# Patient Record
Sex: FEMALE | Race: WHITE | Employment: OTHER | URBAN - METROPOLITAN AREA
[De-identification: names, ages, dates, MRNs, and addresses within clinical notes are randomized per-mention and may not be internally consistent; named-entity substitution may affect disease eponyms.]

---

## 2017-01-03 ENCOUNTER — TELEPHONE (OUTPATIENT)
Dept: INTERNAL MEDICINE CLINIC | Age: 77
End: 2017-01-03

## 2017-02-03 DIAGNOSIS — L50.9 URTICARIA: Primary | ICD-10-CM

## 2017-02-03 RX ORDER — HYDROXYZINE 25 MG/1
25 TABLET, FILM COATED ORAL
Qty: 30 TAB | Refills: 2 | Status: SHIPPED | OUTPATIENT
Start: 2017-02-03 | End: 2017-02-10

## 2017-02-06 ENCOUNTER — OFFICE VISIT (OUTPATIENT)
Dept: INTERNAL MEDICINE CLINIC | Age: 77
End: 2017-02-06

## 2017-02-06 VITALS
SYSTOLIC BLOOD PRESSURE: 122 MMHG | TEMPERATURE: 96.2 F | RESPIRATION RATE: 16 BRPM | HEIGHT: 61 IN | WEIGHT: 115.2 LBS | HEART RATE: 81 BPM | BODY MASS INDEX: 21.75 KG/M2 | OXYGEN SATURATION: 98 % | DIASTOLIC BLOOD PRESSURE: 70 MMHG

## 2017-02-06 DIAGNOSIS — Z00.00 MEDICARE ANNUAL WELLNESS VISIT, SUBSEQUENT: Primary | ICD-10-CM

## 2017-02-06 RX ORDER — HYDROXYZINE HYDROCHLORIDE 10 MG/1
TABLET, FILM COATED ORAL
Refills: 2 | COMMUNITY
Start: 2016-11-13 | End: 2017-02-10

## 2017-02-06 NOTE — MR AVS SNAPSHOT
Visit Information Date & Time Provider Department Dept. Phone Encounter #  
 2/6/2017 11:45 AM [facility] [phone] Upcoming Health Maintenance Date Due  
 GLAUCOMA SCREENING Q2Y 10/1/2015 INFLUENZA AGE 9 TO ADULT 8/1/2016 MEDICARE YEARLY EXAM 2/7/2018 DTaP/Tdap/Td series (2 - Td) 4/19/2026 COLONOSCOPY 5/23/2026 Allergies as of 2/6/2017  Review Complete On: 2/6/2017 By: Marquita Jeans, LPN Severity Noted Reaction Type Reactions Other Food  05/20/2016    Nausea and Vomiting Artificial sweeteners allergy. Does not use corn syrup in diet. 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 Lightheadedness and dizziness, vertigo. Heart \"beating out of my chest\". \"Inhaled a breath but could not exhale\". Was on a fluoride treatment with dentist. [place] to ER. \"[place] like I was jello\". Nitrous Oxide  12/08/2014   Side Effect Other (comments) Blood pressure bottoms out Other Medication  05/20/2016    Other (comments) Avelox per pt is about the only antibiotic she can take. Not sure of the names of antibiotics she is allergic to. Caused intestinal obstruction. Other Plant, Animal, Environmental  05/20/2016    Other (comments) Mold spores, airborne fungal spores, trees, plants causes sinus infections and pneumonia. Pcn [Penicillins]  05/20/2016    Other (comments) State PCN does not work. Wheat  05/20/2016    Other (comments) Cannot digest wheat per pt. Current Immunizations  Reviewed on 4/25/2016 Name Date Pneumococcal Conjugate (PCV-13) 4/19/2016 Pneumococcal Polysaccharide (PPSV-23) 8/1/2012 Tdap 4/19/2016 Zoster Vaccine, Live 9/1/2015 Not reviewed this visit You Were Diagnosed With   
  
 Codes Comments  Medicare annual wellness visit, subsequent    -  Primary ICD-10-CM: Z00.00 ICD-9-CM: V70.0 Vitals BP Pulse Temp Resp Height(growth percentile) Weight(growth percentile) 122/70 (BP 1 Location: Left arm, BP Patient Position: Sitting) 81 96.2 °F (35.7 °C) (Oral) 16 5' 1\" (1.549 m) 115 lb 3.2 oz (52.3 kg) SpO2 BMI OB Status Smoking Status 98% 21.77 kg/m2 Postmenopausal Current Every Day Smoker Vitals History BMI and BSA Data Body Mass Index Body Surface Area 21.77 kg/m 2 1.5 m 2 Preferred Pharmacy Pharmacy Name Phone [facility], [address] [place] [address] [phone] Your Updated Medication List  
  
   
This list is accurate as of: 2/6/17 12:21 PM.  Always use your most recent med list. ADVIL 200 mg tablet Generic drug:  ibuprofen Take 200 mg by mouth every eight (8) hours as needed for Pain. ALPRAZolam 0.25 mg tablet Commonly known as:  XANAX  
TAKE ONE TABLET BY MOUTH TWICE DAILY AS NEEDED FOR ANXIETY  
  
 calcitonin (salmon) nasal  
Commonly known as:  MIACALCIN  
INHALE 1 SPRAY ONCE A DAY AS DIRECTED  
  
 CALCIUM + D PO Take 630 mg by mouth daily. cromolyn 5.2 mg/spray (4 %) nasal spray Commonly known as:  NASALCROM  
1 [place] by Both Nostrils route as needed for Allergies. fexofenadine 180 mg tablet Commonly known as:  Christina Chock Take  by mouth. * hydrOXYzine HCl 10 mg tablet Commonly known as:  ATARAX TAKE 1 TAB BY MOUTH THREE (3) TIMES DAILY AS NEEDED (FOR VERTIGO OR HIVES). * hydrOXYzine HCl 25 mg tablet Commonly known as:  ATARAX Take 1 Tab by mouth three (3) times daily as needed for Itching for up to 10 days. Indications: URTICARIA  
  
 hyoscyamine 0.125 mg tablet Commonly known as:  ANASPAZ, LEVSIN  
TAKE 1 TABLET BY MOUTH BEFORE MEALS 3 TIMES A DAY & AT BEDTIME  
  
 levothyroxine 50 mcg tablet Commonly known as:  SYNTHROID Take 1 Tab by mouth Daily (before breakfast). LYRICA 75 mg capsule Generic drug:  pregabalin TAKE ONE CAPSULE BY MOUTH THREE TIMES DAILY  
  
 OTHER  
GAS AID as needed. SPIRULINA Take 6 Tabs by mouth daily. Each tab is 550mg. * Notice: This list has 2 medication(s) that are the same as other medications prescribed for you. Read the directions carefully, and ask your doctor or other care provider to review them with you. Patient Instructions Schedule of Personalized Health Plan (Provide Copy to Patient) The best way to stay healthy is to live a healthy lifestyle. A healthy lifestyle includes regular exercise, eating a well-balanced diet, keeping a healthy weight and not smoking. Regular physical exams and screening tests are another important way to take care of yourself. Preventive exams provided by health care providers can find health problems early when treatment works best and can keep you from getting certain diseases or illnesses. Preventive services include exams, lab tests, screenings, shots, monitoring and information to help you take care of your own health. All people over 65 should have a pneumonia shot. Pneumonia shots are usually only needed once in a lifetime unless your doctor decides differently. All people over 65 should have a yearly flu shot.-patient declined People over 65 are at medium to high risk for Hepatitis B. Three shots are needed for complete protection. In addition to your physical exam, some screening tests are recommended: 
 
Bone mass measurement (dexa scan) is recommended every two years Diabetes Mellitus screening is recommended every year. Glaucoma is an eye disease caused by high pressure in the eye. An eye exam is recommended every year. Cardiovascular screening tests that check your cholesterol and other blood fat (lipid) levels are recommended every five years.   
 
Colorectal Cancer screening tests help to find pre-cancerous polyps (growths in the colon) so they can be removed before they turn into cancer. Tests ordered for screening depend on your personal and family history risk factors. Screening for Breast Cancer is recommended yearly with a mammogram. 
 
Screening for Cervical Cancer is recommended every two years (annually for certain risk factors, such as previous history of STD or abnormal PAP in past 7 years), with a Pelvic Exam with PAP Here is a list of your current Health Maintenance items with a due date: 
Health Maintenance Topic Date Due  GLAUCOMA SCREENING Q2Y  10/01/2015  INFLUENZA AGE 9 TO ADULT  08/01/2016  MEDICARE YEARLY EXAM  02/07/2018  DTaP/Tdap/Td series (2 - Td) 04/19/2026  COLONOSCOPY  05/23/2026  
 OSTEOPOROSIS SCREENING (DEXA)  Completed  ZOSTER VACCINE AGE 60>  Completed  Pneumococcal 65+ Low/Medium Risk  Completed [facility] SERVICES! Shyla Genao introduces WikiYou patient portal. Now you can access parts of your medical record, email your doctor's office, and request medication refills online. 1. In your internet browser, go to [email or web address] 2. Click on the First Time User? Click Here link in the Sign In box. You will see the New Member Sign Up page. 3. Enter your WikiYou Access Code exactly as it appears below. You will not need to use this code after youve completed the sign-up process. If you do not sign up before the expiration date, you must request a new code. · WikiYou Access Code: [email or web address] Expires: 3/21/2017  4:17 PM 
 
4. Enter the last four digits of your Social Security Number (xxxx) and Date of Birth (mm/dd/yyyy) as indicated and click Submit. You will be taken to the next sign-up page. 5. Create a WikiYou ID. This will be your WikiYou login ID and cannot be changed, so think of one that is secure and easy to remember. 6. Create a NightOwlt password. You can change your password at any time. 7. Enter your Password Reset Question and Answer. This can be used at a later time if you forget your password. 8. Enter your e-mail address. You will receive e-mail notification when new information is available in [address] 9. Click Sign Up. You can now view and download portions of your medical record. 10. Click the Download Summary menu link to download a portable copy of your medical information. If you have questions, please visit the Frequently Asked Questions section of the [facility] website. Remember, [facility] is NOT to be used for urgent needs. For medical emergencies, dial 911. Now available from your iPhone and Android! Please provide this summary of care documentation to your next provider. Your primary care clinician is listed as Ferrell Soulier. If you have any questions after today's visit, please call [phone].

## 2017-02-06 NOTE — PATIENT INSTRUCTIONS
Schedule of Personalized Health Plan  (Provide Copy to Patient)  The best way to stay healthy is to live a healthy lifestyle. A healthy lifestyle includes regular exercise, eating a well-balanced diet, keeping a healthy weight and not smoking. Regular physical exams and screening tests are another important way to take care of yourself. Preventive exams provided by health care providers can find health problems early when treatment works best and can keep you from getting certain diseases or illnesses. Preventive services include exams, lab tests, screenings, shots, monitoring and information to help you take care of your own health. All people over 65 should have a pneumonia shot. Pneumonia shots are usually only needed once in a lifetime unless your doctor decides differently. All people over 65 should have a yearly flu shot.-patient declined    People over 65 are at medium to high risk for Hepatitis B. Three shots are needed for complete protection. In addition to your physical exam, some screening tests are recommended:    Bone mass measurement (dexa scan) is recommended every two years  Diabetes Mellitus screening is recommended every year. Glaucoma is an eye disease caused by high pressure in the eye. An eye exam is recommended every year. Cardiovascular screening tests that check your cholesterol and other blood fat (lipid) levels are recommended every five years. Colorectal Cancer screening tests help to find pre-cancerous polyps (growths in the colon) so they can be removed before they turn into cancer. Tests ordered for screening depend on your personal and family history risk factors.     Screening for Breast Cancer is recommended yearly with a mammogram.    Screening for Cervical Cancer is recommended every two years (annually for certain risk factors, such as previous history of STD or abnormal PAP in past 7 years), with a Pelvic Exam with PAP    Here is a list of your current Health Maintenance items with a due date:  Health Maintenance   Topic Date Due    GLAUCOMA SCREENING Q2Y  10/01/2015    INFLUENZA AGE 9 TO ADULT  08/01/2016    MEDICARE YEARLY EXAM  02/07/2018    DTaP/Tdap/Td series (2 - Td) 04/19/2026    COLONOSCOPY  05/23/2026    OSTEOPOROSIS SCREENING (DEXA)  Completed    ZOSTER VACCINE AGE 60>  Completed    Pneumococcal 65+ Low/Medium Risk  Completed

## 2017-02-06 NOTE — PROGRESS NOTES
Arron Rowley is a 78 y.o. female and presents for annual Medicare Wellness Visit. Problem List: Reviewed with patient and discussed risk factors. Patient Active Problem List   Diagnosis Code    Hypothyroidism E03.9    Hyperlipidemia E78.5    Spinal stenosis, lumbar M48.06    Degenerative disc disease, lumbar M51.36    Irritable bowel syndrome without diarrhea K58.9    Personal history of DVT (deep vein thrombosis) Z86.718    History of shingles Z86.19       Current medical providers:  Patient Care Team:  Eric Valentine MD as PCP - General (Internal Medicine)    PSH: Reviewed with patient  Past Surgical History   Procedure Laterality Date    Pr appendectomy      Pr breast surgery procedure unlisted       stereotactic bx bilateral - bening    Hx other surgical       pilonidal cyst removed    Hx gyn  1980     laparotomy - ovarian cysts    Hx dilation and curettage       x2    Hx tubal ligation       went in to do tubal ligation and found that tubes were \"wiped out from appendicitis\".    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 spine    Hx cataract removal  2013     bilateral    Hx tonsillectomy      Hx heent       dental surgeries    Colonoscopy N/A 5/23/2016     COLONOSCOPY performed by Kings Telles MD at [address] [place] [address] ENDOSCOPY        SH: Reviewed with patient  Social History   Substance Use Topics    Smoking status: Current Every Day Smoker     Types: Cigarettes    Smokeless tobacco: Never Used      Comment: 4-5 cigs per day    Alcohol use No       FH: Reviewed with patient  Family History   Problem Relation Age of Onset   Aundria Dadds Migraines Mother     Cancer Mother      breast - mets to spinal cord and brain    Breast Cancer Mother      42's    Stroke Father      mini    Dementia Father     Other Father      prostate surgery/hallucination with pain meds (too much per pt)/tremors    Other Brother      brain aneurysm/blood clot in leg/polio    Cancer Brother      lung    Headache Maternal Grandmother     Kidney Disease Maternal Grandmother     Heart Disease Maternal Grandfather     Heart Disease Paternal Grandmother     Other Paternal Grandmother      overweight    Heart Attack Paternal Grandfather        Medications/Allergies: Reviewed with patient  Current Outpatient Prescriptions on File Prior to Visit   Medication Sig Dispense Refill    hydrOXYzine HCl (ATARAX) 25 mg tablet Take 1 Tab by mouth three (3) times daily as needed for Itching for up to 10 days. Indications: URTICARIA 30 Tab 2    ALPRAZolam (XANAX) 0.25 mg tablet TAKE ONE TABLET BY MOUTH TWICE DAILY AS NEEDED FOR ANXIETY 60 Tab 0    LYRICA 75 mg capsule TAKE ONE CAPSULE BY MOUTH THREE TIMES DAILY 90 Cap 3    calcitonin, salmon, (MIACALCIN) nasal INHALE 1 SPRAY ONCE A DAY AS DIRECTED 2 Bottle 2    levothyroxine (SYNTHROID) 50 mcg tablet Take 1 Tab by mouth Daily (before breakfast). 30 Tab 4    fexofenadine (ALLEGRA) 180 mg tablet Take  by mouth.  CALCIUM CARBONATE/VITAMIN D3 (CALCIUM + D PO) Take 630 mg by mouth daily.  BLUE-GREEN ALGAE (SPIRULINA) Take 6 Tabs by mouth daily. Each tab is 550mg.  OTHER GAS AID as needed.  cromolyn (NASALCROM) 5.2 mg/spray (4 %) nasal spray 1 [place] by Both Nostrils route as needed for Allergies.  hyoscyamine (ANASPAZ, LEVSIN) 0.125 mg tablet TAKE 1 TABLET BY MOUTH BEFORE MEALS 3 TIMES A DAY & AT BEDTIME 60 Tab 6    ibuprofen (ADVIL) 200 mg tablet Take 200 mg by mouth every eight (8) hours as needed for Pain. No current facility-administered medications on file prior to visit. Allergies   Allergen Reactions    Other Food Nausea and Vomiting     Artificial sweeteners allergy. Does not use corn syrup in diet. 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 Lightheadedness and dizziness, vertigo. Heart \"beating out of my chest\". \"Inhaled a breath but could not exhale\".  Was on a fluoride treatment with dentist. [place] to ER. \"[place] like I was jello\".  Nitrous Oxide Other (comments)     Blood pressure bottoms out    Other Medication Other (comments)     Avelox per pt is about the only antibiotic she can take. Not sure of the names of antibiotics she is allergic to. Caused intestinal obstruction.  Other Plant, Animal, Environmental Other (comments)     Mold spores, airborne fungal spores, trees, plants causes sinus infections and pneumonia.  Pcn [Penicillins] Other (comments)     State PCN does not work.  Wheat Other (comments)     Cannot digest wheat per pt. Objective:  Visit Vitals    /70 (BP 1 Location: Left arm, BP Patient Position: Sitting)    Pulse 81    Temp 96.2 °F (35.7 °C) (Oral)    Resp 16    Ht 5' 1\" (1.549 m)    Wt 115 lb 3.2 oz (52.3 kg)    SpO2 98%    BMI 21.77 kg/m2    Body mass index is 21.77 kg/(m^2). Assessment of cognitive impairment: Alert and oriented x 3    Depression Screen:   PHQ 2 / 9, over the last two weeks 2/6/2017   Little interest or pleasure in doing things Not at all   Feeling down, depressed or hopeless Not at all   Total Score PHQ 2 0       Fall Risk Assessment:    Fall Risk Assessment, last 12 mths 2/6/2017   Able to walk? Yes   Fall in past 12 months? Yes   Fall with injury? No   Number of falls in past 12 months 1   Fall Risk Score 1       Functional Ability:   Does the patient exhibit a steady gait? yes   How long did it take the patient to get up and walk from a sitting position? 4-5 seconds   Is the patient self reliant?  (ie can do own laundry, meals, household chores)  yes     Does the patient handle his/her own medications? yes     Does the patient handle his/her own money? yes     Is the patients home safe (ie good lighting, handrails on stairs and bath, etc.)? yes     Did you notice or did patient express any hearing difficulties? no     Did you notice or did patient express any vision difficulties?    no Were distance and reading eye charts used? no       Advance Care Planning:   Patient was offered the opportunity to discuss advance care planning:  yes     Does patient have an Advance Directive:  no   If no, did you provide information on Caring Connections?  no       Plan:      Orders Placed This Encounter    hydrOXYzine HCl (ATARAX) 10 mg tablet       Health Maintenance   Topic Date Due    GLAUCOMA SCREENING Q2Y  10/01/2015    INFLUENZA AGE 9 TO ADULT  08/01/2016    MEDICARE YEARLY EXAM  02/07/2018    DTaP/Tdap/Td series (2 - Td) 04/19/2026    COLONOSCOPY  05/23/2026    OSTEOPOROSIS SCREENING (DEXA)  Completed    ZOSTER VACCINE AGE 60>  Completed    Pneumococcal 65+ Low/Medium Risk  Completed       *Patient verbalized understanding and agreement with the plan. A copy of the After Visit Summary with personalized health plan was given to the patient today. Patient up to date on all immunizations, except flu which she denies. Patient is due for eye exam and will schedule with Dr. Andrew Manning. Last DXA scan and mammogram done 10/2016. Last colonoscopy 5/2016 with Dr. Bal Tarango. Patient reports she tries to follow FODMAP diet. She does gardening for exercise. She has grab bars in her bathroom and avoids throw rugs. Discussed advanced medical directive info with patient.

## 2017-02-10 DIAGNOSIS — F41.9 ANXIETY: ICD-10-CM

## 2017-02-10 RX ORDER — ALPRAZOLAM 0.25 MG/1
TABLET ORAL
Qty: 60 TAB | Refills: 0 | Status: SHIPPED | OUTPATIENT
Start: 2017-02-10 | End: 2017-03-10 | Stop reason: SDUPTHER

## 2017-03-10 DIAGNOSIS — F41.9 ANXIETY: ICD-10-CM

## 2017-03-10 DIAGNOSIS — E03.8 OTHER SPECIFIED HYPOTHYROIDISM: Primary | ICD-10-CM

## 2017-03-10 RX ORDER — LEVOTHYROXINE SODIUM 50 UG/1
50 TABLET ORAL
Qty: 30 TAB | Refills: 4 | Status: SHIPPED | OUTPATIENT
Start: 2017-03-10 | End: 2017-04-20 | Stop reason: SDUPTHER

## 2017-03-13 RX ORDER — ALPRAZOLAM 0.25 MG/1
TABLET ORAL
Qty: 60 TAB | Refills: 0 | Status: SHIPPED | OUTPATIENT
Start: 2017-03-13 | End: 2017-04-11 | Stop reason: SDUPTHER

## 2017-03-22 RX ORDER — HYOSCYAMINE SULFATE 0.125 MG
TABLET ORAL
Qty: 150 TAB | Refills: 0 | Status: SHIPPED | OUTPATIENT
Start: 2017-03-22 | End: 2017-06-14

## 2017-03-29 RX ORDER — PREGABALIN 75 MG/1
CAPSULE ORAL
Qty: 90 CAP | Refills: 3 | Status: SHIPPED | OUTPATIENT
Start: 2017-03-29 | End: 2017-07-24 | Stop reason: SDUPTHER

## 2017-04-11 DIAGNOSIS — F41.9 ANXIETY: ICD-10-CM

## 2017-04-12 RX ORDER — ALPRAZOLAM 0.25 MG/1
TABLET ORAL
Qty: 60 TAB | Refills: 0 | Status: SHIPPED | OUTPATIENT
Start: 2017-04-12 | End: 2017-06-14

## 2017-05-02 DIAGNOSIS — L50.9 URTICARIA: ICD-10-CM

## 2017-05-03 RX ORDER — HYDROXYZINE 25 MG/1
TABLET, FILM COATED ORAL
Qty: 30 TAB | Refills: 0 | Status: SHIPPED | OUTPATIENT
Start: 2017-05-03 | End: 2017-06-01 | Stop reason: SDUPTHER

## 2017-05-23 RX ORDER — CALCITONIN SALMON 200 [IU]/.09ML
SPRAY, METERED NASAL
Qty: 2 BOTTLE | Refills: 2 | Status: SHIPPED | OUTPATIENT
Start: 2017-05-23 | End: 2018-02-07 | Stop reason: SDUPTHER

## 2017-06-01 DIAGNOSIS — L50.9 URTICARIA: ICD-10-CM

## 2017-06-01 RX ORDER — HYDROXYZINE 25 MG/1
TABLET, FILM COATED ORAL
Qty: 30 TAB | Refills: 0 | Status: SHIPPED | OUTPATIENT
Start: 2017-06-01 | End: 2017-07-06 | Stop reason: SDUPTHER

## 2017-06-06 DIAGNOSIS — F41.9 ANXIETY: ICD-10-CM

## 2017-06-07 RX ORDER — ALPRAZOLAM 0.25 MG/1
TABLET ORAL
Qty: 60 TAB | Refills: 0 | OUTPATIENT
Start: 2017-06-07

## 2017-06-08 NOTE — TELEPHONE ENCOUNTER
Contacted patient to schedule appointment.  She is having issues with her eyes and will contact our office once she has gotten everything with her ophthalmologist  out the way

## 2017-06-14 ENCOUNTER — OFFICE VISIT (OUTPATIENT)
Dept: INTERNAL MEDICINE CLINIC | Age: 77
End: 2017-06-14

## 2017-06-14 VITALS
RESPIRATION RATE: 18 BRPM | SYSTOLIC BLOOD PRESSURE: 160 MMHG | BODY MASS INDEX: 21.69 KG/M2 | DIASTOLIC BLOOD PRESSURE: 60 MMHG | HEART RATE: 76 BPM | HEIGHT: 61 IN | OXYGEN SATURATION: 98 % | WEIGHT: 114.9 LBS | TEMPERATURE: 97 F

## 2017-06-14 DIAGNOSIS — F41.1 ANXIETY, GENERALIZED: Primary | ICD-10-CM

## 2017-06-14 RX ORDER — ESCITALOPRAM OXALATE 10 MG/1
10 TABLET ORAL DAILY
Qty: 30 TAB | Refills: 3 | Status: SHIPPED | OUTPATIENT
Start: 2017-06-14 | End: 2017-10-04 | Stop reason: SDUPTHER

## 2017-06-14 NOTE — PROGRESS NOTES
Subjective:     Chief Complaint   Patient presents with    Medication Evaluation     She  is a 78y.o. year old female who presents for evaluation. Everything is going pretty well for here. Here to discuss medication. Was able to taper off of Xanax and has been off of it for one month. Has to get during the night to urinate. Has had spasm in hand and hit left eye with hand.       Historical Data    Past Medical History:   Diagnosis Date    Adverse effect of anesthesia     decreased BP with nitrous oxide    Chronic kidney disease     kidney stones    Chronic pain     neuropathy - burning feeling    Degenerative disc disease     Hypothyroidism     Ill-defined condition     genital warts    Ill-defined condition     neuropathy in torso    Ill-defined condition     osteopenia    Ill-defined condition     stigmatism right eye    Ill-defined condition     IBS    Ill-defined condition     phlebitis    Ill-defined condition     hx chickenpox/shingles    Ill-defined condition     hx double pneumonia    Ill-defined condition     \"stomach moved over after fall\" per pt per MD    Ill-defined condition     as child chronic tonsillitis/strep/gets infections easily per pt    Lumbar stenosis     Rheumatic fever     Thromboembolus (HCC)     R leg    Tremor     hand/fingers        Past Surgical History:   Procedure Laterality Date    APPENDECTOMY      BREAST SURGERY PROCEDURE UNLISTED      stereotactic bx bilateral - bening    COLONOSCOPY N/A 5/23/2016    COLONOSCOPY performed by Klaudia Cardoso MD at [facility] ENDOSCOPY    HX CATARACT REMOVAL  2013    bilateral    HX DILATION AND CURETTAGE      x2    HX GYN  1980    laparotomy - ovarian cysts    HX HEENT      dental surgeries    HX ORTHOPAEDIC      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 Outpatient Encounter Prescriptions as of 6/14/2017   Medication Sig Dispense Refill    hydrOXYzine HCl (ATARAX) 25 mg tablet TAKE ONE TABLET BY MOUTH THREE TIMES DAILY AS NEEDED FOR ITCHING FOR UP TO 10 DAYS 30 Tab 0    calcitonin, salmon, (MIACALCIN) nasal INHALE 1 SPRAY ONCE A DAY AS DIRECTED 2 Bottle 2    levothyroxine (SYNTHROID) 50 mcg tablet Take 1 Tab by mouth Daily (before breakfast). 90 Tab 2    ALPRAZolam (XANAX) 0.25 mg tablet TAKE ONE TABLET BY MOUTH TWICE DAILY AS NEEDED FOR ANXIETY 60 Tab 0    LYRICA 75 mg capsule TAKE ONE CAPSULE BY MOUTH THREE TIMES DAILY 90 Cap 3    hyoscyamine (ANASPAZ, LEVSIN) 0.125 mg tablet TAKE ONE TABLET BY MOUTH THREE TIMES A DAY BEFORE MEALS AND AT BEDTIME 150 Tab 0    fexofenadine (ALLEGRA) 180 mg tablet Take  by mouth.  CALCIUM CARBONATE/VITAMIN D3 (CALCIUM + D PO) Take 630 mg by mouth daily.  BLUE-GREEN ALGAE (SPIRULINA) Take 6 Tabs by mouth daily. Each tab is 550mg.  OTHER GAS AID as needed.  cromolyn (NASALCROM) 5.2 mg/spray (4 %) nasal spray 1 [place] by Both Nostrils route as needed for Allergies.  ibuprofen (ADVIL) 200 mg tablet Take 200 mg by mouth every eight (8) hours as needed for Pain. No facility-administered encounter medications on file as of 6/14/2017. Allergies   Allergen Reactions    Other Food Nausea and Vomiting     Artificial sweeteners allergy. Does not use corn syrup in diet. 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 Lightheadedness and dizziness, vertigo. Heart \"beating out of my chest\". \"Inhaled a breath but could not exhale\". Was on a fluoride treatment with dentist. Lurdes Mendieta to ER. \"[place] like I was jello\".  Nitrous Oxide Other (comments)     Blood pressure bottoms out    Other Medication Other (comments)     Avelox per pt is about the only antibiotic she can take.  Not sure of the names of antibiotics she is allergic to. Caused intestinal obstruction.  Other Plant, Animal, Environmental Other (comments)     Mold spores, airborne fungal spores, trees, plants causes sinus infections and pneumonia.  Pcn [Penicillins] Other (comments)     State PCN does not work.  Wheat Other (comments)     Cannot digest wheat per pt. Social History     Social History    Marital status:      Spouse name: N/A    Number of children: N/A    Years of education: N/A     Occupational History    Not on file. Social History Main Topics    Smoking status: Current Every Day Smoker     Types: Cigarettes    Smokeless tobacco: Never Used      Comment: 4-5 cigs per day    Alcohol use No    Drug use: No    Sexual activity: No     Other Topics Concern    Not on file     Social History Narrative        Review of Systems  Pertinent items are noted in HPI. Objective:     Vitals:    06/14/17 1123   BP: 160/60   Pulse: 76   Resp: 18   Temp: 97 °F (36.1 °C)   TempSrc: Oral   SpO2: 98%   Weight: 114 lb 14.4 oz (52.1 kg)   Height: 5' 1\" (1.549 m)     Pleasant WF in no acute distress. Eyes: O.S. Is without injury. Neck: Supple. Cardiac: RRR without murmurs, gallops or rubs. Lungs: Clear to auscultation. Neuro: Somewhat tremulous. ASSESSMENT / PLAN:   1. Anxiety, generalized  · Successfully off of benzodiazepine. · Start SSRI instead. · Anticipatory guidance. - escitalopram oxalate (LEXAPRO) 10 mg tablet; Take 1 Tab by mouth daily. Indications: ANXIETY WITH DEPRESSION, POST TRAUMATIC STRESS DISORDER  Dispense: 30 Tab; Refill: 3             Follow-up Disposition:  Return in about 4 months (around 10/14/2017) for F/U anxiety, etc.   Advised her to call back or return to office if symptoms worsen/change/persist.  Discussed expected course/resolution/complications of diagnosis in detail with patient. Medication risks/benefits/costs/interactions/alternatives discussed with patient.   She was given an after visit summary which includes diagnoses, current medications, & vitals. She expressed understanding with the diagnosis and plan.

## 2017-06-14 NOTE — PATIENT INSTRUCTIONS
Stay off of alprazolam.  Start Lexapro 10 mg once daily. Limit hydroxyzine. Continue your other medications.

## 2017-06-14 NOTE — MR AVS SNAPSHOT
Visit Information Date & Time Provider Department Dept. Phone Encounter #  
 6/14/2017 11:00 AM Kimberly Gonzalez MD [place] [id number] [facility] [phone] [id number] Follow-up Instructions Return in about 4 months (around 10/14/2017) for F/U anxiety, etc. Upcoming Health Maintenance Date Due  
 GLAUCOMA SCREENING Q2Y 10/1/2015 INFLUENZA AGE 9 TO ADULT 8/1/2017 MEDICARE YEARLY EXAM 2/7/2018 DTaP/Tdap/Td series (2 - Td) 4/19/2026 COLONOSCOPY 5/23/2026 Allergies as of 6/14/2017  Review Complete On: 6/14/2017 By: Kimberly Gonzalez MD  
  
 Severity Noted Reaction Type Reactions Other Food  05/20/2016    Nausea and Vomiting Artificial sweeteners allergy. Does not use corn syrup in diet. 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 Lightheadedness and dizziness, vertigo. Heart \"beating out of my chest\". \"Inhaled a breath but could not exhale\". Was on a fluoride treatment with dentist. Lurdes Mendieta to ER. \"[place] like I was jello\". Nitrous Oxide  12/08/2014   Side Effect Other (comments) Blood pressure bottoms out Other Medication  05/20/2016    Other (comments) Avelox per pt is about the only antibiotic she can take. Not sure of the names of antibiotics she is allergic to. Caused intestinal obstruction. Other Plant, Animal, Environmental  05/20/2016    Other (comments) Mold spores, airborne fungal spores, trees, plants causes sinus infections and pneumonia. Pcn [Penicillins]  05/20/2016    Other (comments) State PCN does not work. Wheat  05/20/2016    Other (comments) Cannot digest wheat per pt. Current Immunizations  Reviewed on 4/25/2016 Name Date Pneumococcal Conjugate (PCV-13) 4/19/2016 Pneumococcal Polysaccharide (PPSV-23) 8/1/2012 Tdap 4/19/2016 Zoster Vaccine, Live 9/1/2015 Not reviewed this visit You Were Diagnosed With   
  
 Codes Comments Anxiety, generalized    -  Primary ICD-10-CM: F41.1 ICD-9-CM: 300.02 Vitals BP Pulse Temp Resp Height(growth percentile) Weight(growth percentile) 160/60 (BP 1 Location: Right arm, BP Patient Position: Sitting) 76 97 °F (36.1 °C) (Oral) 18 5' 1\" (1.549 m) 114 lb 14.4 oz (52.1 kg) SpO2 BMI OB Status Smoking Status 98% 21.71 kg/m2 Postmenopausal Current Every Day Smoker BMI and BSA Data Body Mass Index Body Surface Area 21.71 kg/m 2 1.5 m 2 Preferred Pharmacy Pharmacy Name Phone [facility] [address] , [address] [place] [address] [phone] Your Updated Medication List  
  
   
This list is accurate as of: 6/14/17 11:48 AM.  Always use your most recent med list.  
  
  
  
  
 calcitonin (salmon) nasal  
Commonly known as:  MIACALCIN  
INHALE 1 SPRAY ONCE A DAY AS DIRECTED  
  
 escitalopram oxalate 10 mg tablet Commonly known as:  Mariza Gins Take 1 Tab by mouth daily. Indications: ANXIETY WITH DEPRESSION, POST TRAUMATIC STRESS DISORDER  
  
 fexofenadine 180 mg tablet Commonly known as:  Bernestine [place] Take  by mouth. hydrOXYzine HCl 25 mg tablet Commonly known as:  ATARAX TAKE ONE TABLET BY MOUTH THREE TIMES DAILY AS NEEDED FOR ITCHING FOR UP TO 10 DAYS  
  
 levothyroxine 50 mcg tablet Commonly known as:  SYNTHROID Take 1 Tab by mouth Daily (before breakfast). LYRICA 75 mg capsule Generic drug:  pregabalin TAKE ONE CAPSULE BY MOUTH THREE TIMES DAILY SPIRULINA Take 6 Tabs by mouth daily. Each tab is 550mg. Prescriptions Sent to Pharmacy Refills  
 escitalopram oxalate (LEXAPRO) 10 mg tablet 3 Sig: Take 1 Tab by mouth daily. Indications: ANXIETY WITH DEPRESSION, POST TRAUMATIC STRESS DISORDER Class: Normal  
 Pharmacy: [facility] [address] [place] [address],[address], [address] [place] [address] Ph #: [phone]  Route: Oral  
  
 Follow-up Instructions Return in about 4 months (around 10/14/2017) for F/U anxiety, etc.  
  
  
Patient Instructions Stay off of alprazolam. 
Start Lexapro 10 mg once daily. Limit hydroxyzine. Continue your other medications. Introducing [facility]! [facility] introduces [employer] [facility] patient portal. Now you can access parts of your medical record, email your doctor's office, and request medication refills online. 1. In your internet browser, go to [email or web address] 2. Click on the First Time User? Click Here link in the Sign In box. You will see the New Member Sign Up page. 3. Enter your [employer] [facility] Access Code exactly as it appears below. You will not need to use this code after youve completed the sign-up process. If you do not sign up before the expiration date, you must request a new code. · [employer] [facility] Access Code: [email or web address] Sep Expires: 9/12/2017 11:48 AM 
 
4. Enter the last four digits of your Social Security Number (xxxx) and Date of Birth (mm/dd/yyyy) as indicated and click Submit. You will be taken to the next sign-up page. 5. Create a [employer] [facility] ID. This will be your [employer] [facility] login ID and cannot be changed, so think of one that is secure and easy to remember. 6. Create a [employer] [facility] password. You can change your password at any time. 7. Enter your Password Reset Question and Answer. This can be used at a later time if you forget your password. 8. Enter your e-mail address. You will receive e-mail notification when new information is available in [address] 9. Click Sign Up. You can now view and download portions of your medical record. 10. Click the Download Summary menu link to download a portable copy of your medical information. If you have questions, please visit the Frequently Asked Questions section of the [employer] website. Remember, [employer] [facility] is NOT to be used for urgent needs. For medical emergencies, dial 911. Now available from your iPhone and Android! Please provide this summary of care documentation to your next provider. Your primary care clinician is listed as Nacho Gutierrez. If you have any questions after today's visit, please call [phone].

## 2017-06-28 ENCOUNTER — OFFICE VISIT (OUTPATIENT)
Dept: INTERNAL MEDICINE CLINIC | Age: 77
End: 2017-06-28

## 2017-06-28 VITALS
WEIGHT: 115 LBS | OXYGEN SATURATION: 94 % | BODY MASS INDEX: 21.71 KG/M2 | DIASTOLIC BLOOD PRESSURE: 60 MMHG | TEMPERATURE: 98 F | RESPIRATION RATE: 19 BRPM | HEART RATE: 81 BPM | HEIGHT: 61 IN | SYSTOLIC BLOOD PRESSURE: 100 MMHG

## 2017-06-28 DIAGNOSIS — K13.79 SORE MOUTH: Primary | ICD-10-CM

## 2017-06-28 DIAGNOSIS — Z72.0 TOBACCO ABUSE: ICD-10-CM

## 2017-06-28 NOTE — PROGRESS NOTES
Subjective:     Chief Complaint   Patient presents with    Mouth Lesions     cold sore      She  is a 78y.o. year old female who presents for evaluation. Been biting lip from stress. \"Munches it up\". Had allergy attack recently. Using Allegra. Also tried Mucinex for some time. Recently started Lexapro. Historical Data    Past Medical History:   Diagnosis Date    Adverse effect of anesthesia     decreased BP with nitrous oxide    Chronic kidney disease     kidney stones    Chronic pain     neuropathy - burning feeling    Degenerative disc disease     Hypothyroidism     Ill-defined condition     genital warts    Ill-defined condition     neuropathy in torso    Ill-defined condition     osteopenia    Ill-defined condition     stigmatism right eye    Ill-defined condition     IBS    Ill-defined condition     phlebitis    Ill-defined condition     hx chickenpox/shingles    Ill-defined condition     hx double pneumonia    Ill-defined condition     \"stomach moved over after fall\" per pt per MD    Ill-defined condition     as child chronic tonsillitis/strep/gets infections easily per pt    Lumbar stenosis     Rheumatic fever     Thromboembolus (HCC)     R leg    Tremor     hand/fingers        Past Surgical History:   Procedure Laterality Date    APPENDECTOMY      BREAST SURGERY PROCEDURE UNLISTED      stereotactic bx bilateral - bening    COLONOSCOPY N/A 5/23/2016    COL[facility] performed [facility] at [facility] ENDOSCOPY    HX CATARACT REMOVAL  2013    bilateral    HX DILATION AND CURETTAGE      x2    HX GYN  1980    laparotomy - ovarian cysts    HX HEENT      dental surgeries    HX ORTHOPAEDIC      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         Outpatient Encounter Prescriptions as of 6/28/2017   Medication Sig Dispense Refill    escitalopram oxalate (LEXAPRO) 10 mg tablet Take 1 Tab by mouth daily. Indications: ANXIETY WITH DEPRESSION, POST TRAUMATIC STRESS DISORDER 30 Tab 3    hydrOXYzine HCl (ATARAX) 25 mg tablet TAKE ONE TABLET BY MOUTH THREE TIMES DAILY AS NEEDED FOR ITCHING FOR UP TO 10 DAYS 30 Tab 0    calcitonin, salmon, (MIACALCIN) nasal INHALE 1 SPRAY ONCE A DAY AS DIRECTED 2 Bottle 2    levothyroxine (SYNTHROID) 50 mcg tablet Take 1 Tab by mouth Daily (before breakfast). 90 Tab 2    LYRICA 75 mg capsule TAKE ONE CAPSULE BY MOUTH THREE TIMES DAILY 90 Cap 3    fexofenadine (ALLEGRA) 180 mg tablet Take  by mouth.  BLUE-GREEN ALGAE (SPIRULINA) Take 6 Tabs by mouth daily. Each tab is 550mg. No facility-administered encounter medications on file as of 6/28/2017. Allergies   Allergen Reactions    Other Food Nausea and Vomiting     Artificial sweeteners allergy. Does not use corn syrup in diet. 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 Lightheadedness and dizziness, vertigo. Heart \"beating out of my chest\". \"Inhaled a breath but could not exhale\". Was on a fluoride treatment with dentist. Aicha Ho to ER. \"[place] like I was jello\".  Nitrous Oxide Other (comments)     Blood pressure bottoms out    Other Medication Other (comments)     Avelox per pt is about the only antibiotic she can take. Not sure of the names of antibiotics she is allergic to. Caused intestinal obstruction.  Other Plant, Animal, Environmental Other (comments)     Mold spores, airborne fungal spores, trees, plants causes sinus infections and pneumonia.  Pcn [Penicillins] Other (comments)     State PCN does not work.  Wheat Other (comments)     Cannot digest wheat per pt.         Social History     Social History    Marital status:      Spouse name: N/A    Number of children: N/A    Years of education: N/A Occupational History    Not on file. Social History Main Topics    Smoking status: Current Every Day Smoker     Types: Cigarettes    Smokeless tobacco: Never Used      Comment: 4-5 cigs per day    Alcohol use No    Drug use: No    Sexual activity: No     Other Topics Concern    Not on file     Social History Narrative        Review of Systems  Pertinent items are noted in HPI. Objective:     Vitals:    06/28/17 1536   BP: 100/60   Pulse: 81   Resp: 19   Temp: 98 °F (36.7 °C)   TempSrc: Oral   SpO2: 94%   Weight: 115 lb (52.2 kg)   Height: 5' 1\" (1.549 m)     Pleasant female in no distress. Oral:  No visible lesions. Lungs: Clear to auscultation. ASSESSMENT / PLAN:   1. Sore mouth    - magic mouthwash solution; Magic mouth wash   Maalox  Lidocaine 2% viscous   Diphenhydramine oral solution     Pharmacy to mix equal portions of ingredients to a total volume as indicated in the dispense amount. Dispense: 150 mL; Refill: 2    2. Tobacco abuse  · Advised to stop smoking. Patient Instructions   Use mouthwash for swish and spit two to four times per day as needed. Continue taking Lexapro. Use Allegra for allergies. Follow-up Disposition:  Return in about 4 months (around 10/28/2017) for F/U mouth etc.   Advised her to call back or return to office if symptoms worsen/change/persist.  Discussed expected course/resolution/complications of diagnosis in detail with patient. Medication risks/benefits/costs/interactions/alternatives discussed with patient. She was given an after visit summary which includes diagnoses, current medications, & vitals. She expressed understanding with the diagnosis and plan.

## 2017-06-28 NOTE — PATIENT INSTRUCTIONS
Use mouthwash for swish and spit two to four times per day as needed. Continue taking Lexapro. Use Allegra for allergies.

## 2017-06-28 NOTE — MR AVS SNAPSHOT
Visit Information Date & Time Provider Department Dept. Phone Encounter #  
 6/28/2017  3:30 PM MD Diane [facility] [phone] Follow-up Instructions Return in about 4 months (around 10/28/2017) for F/U mouth etc.  
  
Your Appointments 10/12/2017  1:15 PM  
ROUTINE CARE with MD Diane [facility] Appt Note: 4m anxiety fu  
 [address] , [address] [place] [address]  
[address], [place] [address] [phone] Upcoming Health Maintenance Date Due  
 GLAUCOMA SCREENING Q2Y 10/1/2015 INFLUENZA AGE 9 TO ADULT 8/1/2017 MEDICARE YEARLY EXAM 2/7/2018 DTaP/Tdap/Td series (2 - Td) 4/19/2026 COLONOSCOPY 5/23/2026 Allergies as of 6/28/2017  Review Complete On: 6/28/2017 By: Danielito Berger MD  
  
 Severity Noted Reaction Type Reactions Other Food  05/20/2016    Nausea and Vomiting Artificial sweeteners allergy. Does not use corn syrup in diet. 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 Lightheadedness and dizziness, vertigo. Heart \"beating out of my chest\". \"Inhaled a breath but could not exhale\". Was on a fluoride treatment with dentist. Lorin Carlos to ER. \"Felt like I was jello\". Nitrous Oxide  12/08/2014   Side Effect Other (comments) Blood pressure bottoms out Other Medication  05/20/2016    Other (comments) Avelox per pt is about the only antibiotic she can take. Not sure of the names of antibiotics she is allergic to. Caused intestinal obstruction. Other Plant, Animal, Environmental  05/20/2016    Other (comments) Mold spores, airborne fungal spores, trees, plants causes sinus infections and pneumonia. Pcn [Penicillins]  05/20/2016    Other (comments) State PCN does not work. Wheat  05/20/2016    Other (comments) Cannot digest wheat per pt. Current Immunizations  Reviewed on 4/25/2016 Name Date Pneumococcal Conjugate (PCV-13) 4/19/2016 Pneumococcal Polysaccharide (PPSV-23) 8/1/2012 Tdap 4/19/2016 Zoster Vaccine, Live 9/1/2015 Not reviewed this visit You Were Diagnosed With   
  
 Codes Comments Sore mouth    -  Primary ICD-10-CM: K13.79 ICD-9-CM: 528.9 Tobacco abuse     ICD-10-CM: Z72.0 ICD-9-CM: 305.1 Vitals BP Pulse Temp Resp Height(growth percentile) Weight(growth percentile) 100/60 (BP 1 Location: Right arm, BP Patient Position: Sitting) 81 98 °F (36.7 °C) (Oral) 19 5' 1\" (1.549 m) 115 lb (52.2 kg) SpO2 BMI OB Status Smoking Status 94% 21.73 kg/m2 Postmenopausal Current Every Day Smoker BMI and BSA Data Body Mass Index Body Surface Area 21.73 kg/m 2 1.5 m 2 Preferred Pharmacy Pharmacy Name Phone [facility] [address] [phone] Your Updated Medication List  
  
   
This list is accurate as of: 6/28/17  4:01 PM.  Always use your most recent med list.  
  
  
  
  
 calcitonin (salmon) nasal  
Commonly known as:  MIACALCIN  
INHALE 1 SPRAY ONCE A DAY AS DIRECTED  
  
 escitalopram oxalate 10 mg tablet Commonly known as:  Jimmye Reed Take 1 Tab by mouth daily. Indications: ANXIETY WITH DEPRESSION, POST TRAUMATIC STRESS DISORDER  
  
 fexofenadine 180 mg tablet Commonly known as:  Unique Hoang Take  by mouth. hydrOXYzine HCl 25 mg tablet Commonly known as:  ATARAX TAKE ONE TABLET BY MOUTH THREE TIMES DAILY AS NEEDED FOR ITCHING FOR UP TO 10 DAYS  
  
 levothyroxine 50 mcg tablet Commonly known as:  SYNTHROID Take 1 Tab by mouth Daily (before breakfast). LYRICA 75 mg capsule Generic drug:  pregabalin TAKE ONE CAPSULE BY MOUTH THREE TIMES DAILY  
  
 magic mouthwash solution Magic mouth wash  Maalox Lidocaine 2% viscous  Diphenhydramine oral solution   Pharmacy to mix equal portions of ingredients to a total volume as indicated in the dispense amount. SPIRULINA Take 6 Tabs by mouth daily. Each tab is 550mg. Prescriptions Printed Refills  
 magic mouthwash solution 2 Sig: Magic mouth wash Maalox Lidocaine 2% viscous Diphenhydramine oral solution Pharmacy to mix equal portions of ingredients to a total volume as indicated in the dispense amount. Class: Print Follow-up Instructions Return in about 4 months (around 10/28/2017) for F/U mouth etc.  
  
  
Patient Instructions Use mouthwash for swish and spit two to four times per day as needed. Continue taking Lexapro. Use Allegra for allergies. Introducing [facility] & HEALTH SERVICES! Rod Tidwell introduces [employer] patient portal. Now you can access parts of your medical record, email your doctor's office, and request medication refills online. 1. In your internet browser, go to [email or web address] 2. Click on the First Time User? Click Here link in the Sign In box. You will see the New Member Sign Up page. 3. Enter your [employer] Access Code exactly as it appears below. You will not need to use this code after youve completed the sign-up process. If you do not sign up before the expiration date, you must request a new code. · [employer] Access Code: Karlos Zacarias Expires: 9/12/2017 11:48 AM 
 
4. Enter the last four digits of your Social Security Number (xxxx) and Date of Birth (mm/dd/yyyy) as indicated and click Submit. You will be taken to the next sign-up page. 5. Create a [employer] ID. This will be your [employer] login ID and cannot be changed, so think of one that is secure and easy to remember. 6. Create a [employer] password. You can change your password at any time. 7. Enter your Password Reset Question and Answer.  This can be used at a later time if you forget your password. 8. Enter your e-mail address. You will receive e-mail notification when new information is available in [address] 9. Click Sign Up. You can now view and download portions of your medical record. 10. Click the Download Summary menu link to download a portable copy of your medical information. If you have questions, please visit the Frequently Asked Questions section of the [employer] website. Remember, [employer] is NOT to be used for urgent needs. For medical emergencies, dial 911. Now available from your iPhone and Android! Please provide this summary of care documentation to your next provider. Your primary care clinician is listed as Ryan Munoz. If you have any questions after today's visit, please call [phone].

## 2017-07-06 DIAGNOSIS — L50.9 URTICARIA: ICD-10-CM

## 2017-07-06 RX ORDER — HYDROXYZINE 25 MG/1
TABLET, FILM COATED ORAL
Qty: 30 TAB | Refills: 0 | Status: SHIPPED | OUTPATIENT
Start: 2017-07-06 | End: 2017-08-17 | Stop reason: SDUPTHER

## 2017-08-17 DIAGNOSIS — L50.9 URTICARIA: ICD-10-CM

## 2017-08-17 RX ORDER — HYDROXYZINE 25 MG/1
TABLET, FILM COATED ORAL
Qty: 30 TAB | Refills: 0 | Status: SHIPPED | OUTPATIENT
Start: 2017-08-17 | End: 2017-10-04

## 2017-08-31 ENCOUNTER — OFFICE VISIT (OUTPATIENT)
Dept: INTERNAL MEDICINE CLINIC | Age: 77
End: 2017-08-31

## 2017-08-31 VITALS
DIASTOLIC BLOOD PRESSURE: 78 MMHG | SYSTOLIC BLOOD PRESSURE: 140 MMHG | HEART RATE: 60 BPM | BODY MASS INDEX: 20.88 KG/M2 | WEIGHT: 110.6 LBS | OXYGEN SATURATION: 98 % | TEMPERATURE: 96.1 F | HEIGHT: 61 IN | RESPIRATION RATE: 16 BRPM

## 2017-08-31 DIAGNOSIS — Z86.79 HISTORY OF RHEUMATIC FEVER: ICD-10-CM

## 2017-08-31 DIAGNOSIS — M81.0 AGE-RELATED OSTEOPOROSIS WITHOUT CURRENT PATHOLOGICAL FRACTURE: ICD-10-CM

## 2017-08-31 DIAGNOSIS — T14.8XXA BRUISE: Primary | ICD-10-CM

## 2017-08-31 PROBLEM — R23.3 EASY BRUISING: Status: ACTIVE | Noted: 2017-08-31

## 2017-08-31 NOTE — MR AVS SNAPSHOT
Visit Information Date & Time Provider Department Dept. Phone Encounter #  
 8/31/2017 11:00 AM QI Lee [facility] [phone] [id number] Your Appointments 10/4/2017  1:30 PM  
ROUTINE CARE with MD Diane Mcduffie [facility] Appt Note: 4m anxiety fu  
 [address] , [address] [place] [address]  
[address], [place] [address] [phone] Upcoming Health Maintenance Date Due  
 GLAUCOMA SCREENING Q2Y 10/1/2015 INFLUENZA AGE 9 TO ADULT 10/2/2017* MEDICARE YEARLY EXAM 2/7/2018 DTaP/Tdap/Td series (2 - Td) 4/19/2026 COLONOSCOPY 5/23/2026 *Topic was postponed. The date shown is not the original due date. Allergies as of 8/31/2017  Review Complete On: 8/31/2017 By: Governor Baljit, LPN Severity Noted Reaction Type Reactions Other Food  05/20/2016    Nausea and Vomiting Artificial sweeteners allergy. Does not use corn syrup in diet. 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 Lightheadedness and dizziness, vertigo. Heart \"beating out of my chest\". \"Inhaled a breath but could not exhale\". Was on a fluoride treatment with dentist. Columba Aceves to ER. \"[place] like I was jello\". Nitrous Oxide  12/08/2014   Side Effect Other (comments) Blood pressure bottoms out Other Medication  05/20/2016    Other (comments) Avelox per pt is about the only antibiotic she can take. Not sure of the names of antibiotics she is allergic to. Caused intestinal obstruction. Other Plant, Animal, Environmental  05/20/2016    Other (comments) Mold spores, airborne fungal spores, trees, plants causes sinus infections and pneumonia. Pcn [Penicillins]  05/20/2016    Other (comments) State PCN does not work. Wheat  05/20/2016    Other (comments) Cannot digest wheat per pt. Current Immunizations  Reviewed on 4/25/2016 Name Date Pneumococcal Conjugate (PCV-13) 4/19/2016 Pneumococcal Polysaccharide (PPSV-23) 8/1/2012 Tdap 4/19/2016 Zoster Vaccine, Live 9/1/2015 Not reviewed this visit You Were Diagnosed With   
  
 Codes Comments Bruise    -  Primary ICD-10-CM: T14.8 ICD-9-CM: 924.9 Age-related osteoporosis without current pathological fracture     ICD-10-CM: M81.0 ICD-9-CM: 733.01 History of rheumatic fever     ICD-10-CM: Z86.79 
ICD-9-CM: V12.09 Vitals BP Pulse Temp Resp Height(growth percentile) Weight(growth percentile) 140/78 (BP 1 Location: Right arm, BP Patient Position: Sitting) 60 96.1 °F (35.6 °C) (Oral) 16 5' 1\" (1.549 m) 110 lb 9.6 oz (50.2 kg) SpO2 BMI OB Status Smoking Status 98% 20.9 kg/m2 Postmenopausal Current Every Day Smoker Vitals History BMI and BSA Data Body Mass Index Body Surface Area  
 20.9 kg/m 2 1.47 m 2 Preferred Pharmacy Pharmacy Name Phone [address] [phone] Your Updated Medication List  
  
   
This list is accurate as of: 8/31/17 12:16 PM.  Always use your most recent med list.  
  
  
  
  
 calcitonin (salmon) nasal  
Commonly known as:  MIACALCIN  
INHALE 1 SPRAY ONCE A DAY AS DIRECTED  
  
 escitalopram oxalate 10 mg tablet Commonly known as:  Minor Muscat Take 1 Tab by mouth daily. Indications: ANXIETY WITH DEPRESSION, POST TRAUMATIC STRESS DISORDER  
  
 fexofenadine 180 mg tablet Commonly known as:  Arcadia Chick Take  by mouth. hydrOXYzine HCl 25 mg tablet Commonly known as:  ATARAX TAKE ONE TABLET BY MOUTH THREE TIMES DAILY AS NEEDED FOR  ITCHING  FOR  UP  TO  10  DAYS  
  
 levothyroxine 50 mcg tablet Commonly known as:  SYNTHROID Take 1 Tab by mouth Daily (before breakfast). LYRICA 75 mg capsule Generic drug:  pregabalin TAKE ONE CAPSULE BY MOUTH THREE TIMES DAILY  
  
 magic mouthwash solution Magic mouth wash  Maalox Lidocaine 2% viscous  Diphenhydramine oral solution   Pharmacy to mix equal portions of ingredients to a total volume as indicated in the dispense amount. SPIRULINA Take 6 Tabs by mouth daily. Each tab is 550mg. Patient Instructions Cool compresses to bruising on left arm for 20 minutes per application Introducing [facility] & [facility] SERVICES! Sima Merino introduces [employer] patient portal. Now you can access parts of your medical record, email your doctor's office, and request medication refills online. 1. In your internet browser, go to [email or web address] 2. Click on the First Time User? Click Here link in the Sign In box. You will see the New Member Sign Up page. 3. Enter your [employer] Access Code exactly as it appears below. You will not need to use this code after youve completed the sign-up process. If you do not sign up before the expiration date, you must request a new code. · [employer] Access Code: William Vega Expires: 9/12/2017 11:48 AM 
 
4. Enter the last four digits of your Social Security Number (xxxx) and Date of Birth (mm/dd/yyyy) as indicated and click Submit. You will be taken to the next sign-up page. 5. Create a [employer] ID. This will be your [employer] login ID and cannot be changed, so think of one that is secure and easy to remember. 6. Create a [employer] password. You can change your password at any time. 7. Enter your Password Reset Question and Answer. This can be used at a later time if you forget your password. 8. Enter your e-mail address. You will receive e-mail notification when new information is available in [address] 9. Click Sign Up. You can now view and download portions of your medical record. 10. Click the Download Summary menu link to download a portable copy of your medical information. If you have questions, please visit the Frequently Asked Questions section of the [employer] website. Remember, [employer] is NOT to be used for urgent needs. For medical emergencies, dial 911. Now available from your iPhone and Android! Please provide this summary of care documentation to your next provider. Your primary care clinician is listed as Clark Forrester. If you have any questions after today's visit, please call [phone].

## 2017-08-31 NOTE — PROGRESS NOTES
1. Have you been to the ER, urgent care clinic since your last visit? Hospitalized since your last visit? No    2. Have you seen or consulted any other health care providers outside of the [address] [place] [address] since your last visit? Include any pap smears or colon screening. Eye Dr. Rosalino Barrios- had an appointment recently for left eye problem. Chief Complaint   Patient presents with    Skin Problem     bruise on left arm that has been spreading since she noticed it on Friday afternoon. Patient states she heard a pop on Friday coming from her arm and then woke up the next morning with a bruise and a knot on the left arm.       Not fasting

## 2017-08-31 NOTE — PROGRESS NOTES
79 yo female concerned about a \"spreading\" bruise on LUE. She reports she felt a \"pop\" in the arm 6 days ago, and the next AM noted a knot and a bruise. She took 2 Aleve the first day, Advil the next day, and 2 Aspirin the 3rd day. She thinks the seatbelt came across the area. She also reports being an easy bruiser all her life. She has been recently doing yard work, trimming bushes, etc, but recalls no trauma related to this activity. She lost her balance about 3 weeks ago in [facility], overcorrected to avoid falling, then did fall onto her butt. She has had some L buttock discomfort since then, but is able to ambulate. PE: WNWD WF w/ spinal curvature and forward bending with ambulation   T - 96.1   BP - 140/78   LUE - large area of eccymosis from upper proximal UE to just below anti-cubital space; no palpable hematoma or tenderness   RUE - isolated areas of ecchymosis   Buttocks/gluteal area - no bruising    Imp: Bruising of LUE - likely related to vessel rupture   Hx Easy Bruising    Plan: Cool compresses to LUE   Discussed that the bruising will likely move further down the LUE and that the NSAIDs/Aspirin may have caused the continued bleeding   She will see Dr. Gerda Georges 10/4  _____________________________  Expected course of current diagnosed problem(s) as well as expected progression and possible complications, and desired follow up with provider are discussed with patient. Patient is encouraged to be back in touch with any questions or concerns. Patient expresses understanding of plan of care. Patient is given AVS which includes diagnoses, current medications, vitals.

## 2017-10-04 ENCOUNTER — OFFICE VISIT (OUTPATIENT)
Dept: INTERNAL MEDICINE CLINIC | Age: 77
End: 2017-10-04

## 2017-10-04 VITALS
BODY MASS INDEX: 20.56 KG/M2 | WEIGHT: 108.9 LBS | RESPIRATION RATE: 18 BRPM | SYSTOLIC BLOOD PRESSURE: 100 MMHG | HEIGHT: 61 IN | OXYGEN SATURATION: 95 % | DIASTOLIC BLOOD PRESSURE: 60 MMHG | HEART RATE: 78 BPM | TEMPERATURE: 98.5 F

## 2017-10-04 DIAGNOSIS — R25.1 TREMOR: ICD-10-CM

## 2017-10-04 DIAGNOSIS — Z23 ENCOUNTER FOR IMMUNIZATION: Primary | ICD-10-CM

## 2017-10-04 DIAGNOSIS — F41.1 ANXIETY, GENERALIZED: ICD-10-CM

## 2017-10-04 DIAGNOSIS — R21 FACIAL RASH: ICD-10-CM

## 2017-10-04 RX ORDER — CLINDAMYCIN PHOSPHATE 11.9 MG/ML
SOLUTION TOPICAL
Qty: 60 ML | Refills: 2 | Status: SHIPPED | OUTPATIENT
Start: 2017-10-04

## 2017-10-04 RX ORDER — PREGABALIN 75 MG/1
CAPSULE ORAL
Qty: 90 CAP | Refills: 2 | Status: SHIPPED | OUTPATIENT
Start: 2017-10-04 | End: 2018-01-19 | Stop reason: SDUPTHER

## 2017-10-04 RX ORDER — ESCITALOPRAM OXALATE 10 MG/1
10 TABLET ORAL DAILY
Qty: 30 TAB | Refills: 2 | Status: SHIPPED | OUTPATIENT
Start: 2017-10-04 | End: 2017-10-16 | Stop reason: SDUPTHER

## 2017-10-04 NOTE — MR AVS SNAPSHOT
Visit Information Date & Time Provider Department Dept. Phone Encounter #  
 10/4/2017  1:30 PM Shirlene Oneil MD [place] [id number] [facility] [phone] [id number] Follow-up Instructions Return in about 4 months (around 2/4/2018) for F/U tremor and depression. Upcoming Health Maintenance Date Due  
 GLAUCOMA SCREENING Q2Y 1/31/2018* MEDICARE YEARLY EXAM 2/7/2018 DTaP/Tdap/Td series (2 - Td) 4/19/2026 COLONOSCOPY 5/23/2026 *Topic was postponed. The date shown is not the original due date. Allergies as of 10/4/2017  Review Complete On: 10/4/2017 By: Shirlene Oneil MD  
  
 Severity Noted Reaction Type Reactions Other Food  05/20/2016    Nausea and Vomiting Artificial sweeteners allergy. Does not use corn syrup in diet. 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 Lightheadedness and dizziness, vertigo. Heart \"beating out of my chest\". \"Inhaled a breath but could not exhale\". Was on a fluoride treatment with dentist. Dylon Ho to ER. \"Rupert like I was jello\". Nitrous Oxide  12/08/2014   Side Effect Other (comments) Blood pressure bottoms out Other Medication  05/20/2016    Other (comments) Avelox per pt is about the only antibiotic she can take. Not sure of the names of antibiotics she is allergic to. Caused intestinal obstruction. Other Plant, Animal, Environmental  05/20/2016    Other (comments) Mold spores, airborne fungal spores, trees, plants causes sinus infections and pneumonia. Pcn [Penicillins]  05/20/2016    Other (comments) State PCN does not work. Wheat  05/20/2016    Other (comments) Cannot digest wheat per pt. Current Immunizations  Reviewed on 4/25/2016 Name Date Influenza High Dose Vaccine PF  Incomplete Pneumococcal Conjugate (PCV-13) 4/19/2016 Pneumococcal Polysaccharide (PPSV-23) 8/1/2012 Tdap 4/19/2016 Zoster Vaccine, Live 9/1/2015 Not reviewed this visit You Were Diagnosed With   
  
 Codes Comments Encounter for immunization    -  Primary ICD-10-CM: O75 ICD-9-CM: V03.89 Anxiety, generalized     ICD-10-CM: F41.1 ICD-9-CM: 300.02 Tremor     ICD-10-CM: R25.1 ICD-9-CM: 781.0 Facial rash     ICD-10-CM: R21 
ICD-9-CM: 782.1 Vitals BP Pulse Temp Resp Height(growth percentile) Weight(growth percentile) 100/60 (BP 1 Location: Left arm, BP Patient Position: Sitting) 78 98.5 °F (36.9 °C) (Oral) 18 5' 1\" (1.549 m) 108 lb 14.4 oz (49.4 kg) SpO2 BMI OB Status Smoking Status 95% 20.58 kg/m2 Postmenopausal Current Every Day Smoker BMI and BSA Data Body Mass Index Body Surface Area 20.58 kg/m 2 1.46 m 2 Preferred Pharmacy Pharmacy Name Phone [place] [phone] Your Updated Medication List  
  
   
This list is accurate as of: 10/4/17  2:17 PM.  Always use your most recent med list.  
  
  
  
  
 calcitonin (salmon) nasal  
Commonly known as:  MIACALCIN  
INHALE 1 SPRAY ONCE A DAY AS DIRECTED  
  
 clindamycin 1 % external solution Commonly known as:  CLEOCIN T  
use thin film on affected area twice daily  
  
 escitalopram oxalate 10 mg tablet Commonly known as:  Lauren Gale Take 1 Tab by mouth daily. Indications: ANXIETY WITH DEPRESSION, POST TRAUMATIC STRESS DISORDER  
  
 fexofenadine 180 mg tablet Commonly known as:  Heidy Mix Take  by mouth.  
  
 levothyroxine 50 mcg tablet Commonly known as:  SYNTHROID Take 1 Tab by mouth Daily (before breakfast). magic mouthwash solution Magic mouth wash  Maalox Lidocaine 2% viscous  Diphenhydramine oral solution   Pharmacy to mix equal portions of ingredients to a total volume as indicated in the dispense amount. pregabalin 75 mg capsule Commonly known as:  Herb Bustle TAKE ONE CAPSULE BY MOUTH THREE TIMES DAILY SPIRULINA Take 6 Tabs by mouth daily. Each tab is 550mg. Prescriptions Printed Refills  
 pregabalin (LYRICA) 75 mg capsule 2 Sig: TAKE ONE CAPSULE BY MOUTH THREE TIMES DAILY Class: Print  
 escitalopram oxalate (LEXAPRO) 10 mg tablet 2 Sig: Take 1 Tab by mouth daily. Indications: ANXIETY WITH DEPRESSION, POST TRAUMATIC STRESS DISORDER Class: Print Route: Oral  
  
Prescriptions Sent to Pharmacy Refills  
 clindamycin (CLEOCIN T) 1 % external solution 2 Sig: use thin film on affected area twice daily Class: Normal  
 Pharmacy: [address], [address] [facility] Ph #: [phone] We Performed the Following ADMIN INFLUENZA VIRUS VAC [ \[facility]\""] INFLUENZA VIRUS VACCINE, HIGH DOSE SEASONAL, PRESERVATIVE FREE [10653 CPT(R)] Follow-up Instructions Return in about 4 months (around 2/4/2018) for F/U tremor and depression. Patient Instructions Continue your current medications. Continue a healthy diet. Stay physically active. Vaccine Information Statement Influenza (Flu) Vaccine (Inactivated or Recombinant): What you need to know Many Vaccine Information Statements are available in Congolese and other languages. See www.immunize.org/vis Hojas de Información Sobre Vacunas están disponibles en Español y en muchos otros idiomas. Visite www.immunize.org/vis 1. Why get vaccinated? Influenza (flu) is a contagious disease that spreads around the [place] every year, usually between October and May. Flu is caused by influenza viruses, and is spread mainly by coughing, sneezing, and close contact. Anyone can get flu. Flu strikes suddenly and can last several days. Symptoms vary by age, but can include: 
 fever/chills  sore throat  muscle aches  fatigue  cough  headache  runny or stuffy nose Flu can also lead to pneumonia and blood infections, and cause diarrhea and seizures in children. If you have a medical condition, such as heart or lung disease, flu can make it worse. Flu is more dangerous for some people. Infants and young children, people 72years of age and older, pregnant women, and people with certain health conditions or a weakened immune system are at greatest risk. Each year thousands of people in the [facility] die from flu, and many more are hospitalized. Flu vaccine can: 
 keep you from getting flu, 
 make flu less severe if you do get it, and 
 keep you from spreading flu to your family and other people. 2. Inactivated and recombinant flu vaccines A dose of flu vaccine is recommended every flu season. Children 6 months through 6years of age may need two doses during the same flu season. Everyone else needs only one dose each flu season. Some inactivated flu vaccines contain a very small amount of a mercury-based preservative called thimerosal. Studies have not shown thimerosal in vaccines to be harmful, but flu vaccines that do not contain thimerosal are available. There is no live flu virus in flu shots. They cannot cause the flu. There are many flu viruses, and they are always changing. Each year a new flu vaccine is made to protect against three or four viruses that are likely to cause disease in the upcoming flu season. But even when the vaccine doesnt exactly match these viruses, it may still provide some protection Flu vaccine cannot prevent: 
 flu that is caused by a virus not covered by the vaccine, or 
 illnesses that look like flu but are not. It takes about 2 weeks for protection to develop after vaccination, and protection lasts through the flu season. 3. Some people should not get this vaccine Tell the person who is giving you the vaccine:  If you have any severe, life-threatening allergies. If you ever had a life-threatening allergic reaction after a dose of flu vaccine, or have a severe allergy to any part of this vaccine, you may be advised not to get vaccinated. Most, but not all, types of flu vaccine contain a small amount of egg protein.  If you ever had Guillain-Barré Syndrome (also called GBS). Some people with a history of GBS should not get this vaccine. This should be discussed with your doctor.  If you are not feeling well. It is usually okay to get flu vaccine when you have a mild illness, but you might be asked to come back when you feel better. 4. Risks of a vaccine reaction With any medicine, including vaccines, there is a chance of reactions. These are usually mild and go away on their own, but serious reactions are also possible. Most people who get a flu shot do not have any problems with it. Minor problems following a flu shot include:  
 soreness, redness, or swelling where the shot was given  hoarseness  sore, red or itchy eyes  cough  fever  aches  headache  itching  fatigue If these problems occur, they usually begin soon after the shot and last 1 or 2 days. More serious problems following a flu shot can include the following:  There may be a small increased risk of Guillain-Barré Syndrome (GBS) after inactivated flu vaccine. This risk has been estimated at 1 or 2 additional cases per million people vaccinated. This is much lower than the risk of severe complications from flu, which can be prevented by flu vaccine.  Young children who get the flu shot along with pneumococcal vaccine (PCV13) and/or DTaP vaccine at the same time might be slightly more likely to have a seizure caused by fever. Ask your doctor for more information. Tell your doctor if a child who is getting flu vaccine has ever had a seizure. Problems that could happen after any injected vaccine:  People sometimes faint after a medical procedure, including vaccination. Sitting or lying down for about 15 minutes can help prevent fainting, and injuries caused by a fall. Tell your doctor if you feel dizzy, or have vision changes or ringing in the ears.  Some people get severe pain in the shoulder and have difficulty moving the arm where a shot was given. This happens very rarely.  Any medication can cause a severe allergic reaction. Such reactions from a vaccine are very rare, estimated at about 1 in a million doses, and would happen within a few minutes to a few hours after the vaccination. As with any medicine, there is a very remote chance of a vaccine causing a serious injury or death. The safety of vaccines is always being monitored. For more information, visit: www.cdc.gov/vaccinesafety/ 
 
 
The [facility] Vaccine Injury Compensation Program (VICP) is a federal program that was created to compensate people who may have been injured by certain vaccines. Persons who believe they may have been injured by a vaccine can learn about the program and about filing a claim by calling 9-420.462.6831 or visiting the [employer] website at www.[facility].gov/vaccinecompensation. There is a time limit to file a claim for compensation. 7. How can I learn more?  Ask your healthcare provider. He or she can give you the vaccine package insert or suggest other sources of information.  Call your local or state health department.  Contact the Centers for Disease Control and Prevention (CDC): 
- Call 2-478.409.5719 (1-800-CDC-INFO) or 
- Visit CDCs website at www.cdc.gov/flu Vaccine Information Statement Inactivated Influenza Vaccine 8/7/2015 
42 LEO Sesay [id number] [facility] and [employer] Centers for Disease Control and Prevention Office Use Only Introducing [facility]! [facility] introduces [employer] patient portal. Now you can access parts of your medical record, email your doctor's office, and request medication refills online. 1. In your internet browser, go to [email or web address] 2. Click on the First Time User? Click Here link in the Sign In box. You will see the New Member Sign Up page. 3. Enter your [employer] Access Code exactly as it appears below. You will not need to use this code after youve completed the sign-up process. If you do not sign up before the expiration date, you must request a new code. · [employer] Access Code: [email or web address] Expires: 1/2/2018  2:03 PM 
 
4. Enter the last four digits of your Social Security Number (xxxx) and Date of Birth (mm/dd/yyyy) as indicated and click Submit. You will be taken to the next sign-up page. 5. Create a Nualightt ID. This will be your [employer] login ID and cannot be changed, so think of one that is secure and easy to remember. 6. Create a N[employer]ightt password. You can change your password at any time. 7. Enter your Password Reset Question and Answer. This can be used at a later time if you forget your password. 8. Enter your e-mail address. You will receive e-mail notification when new information is available in [address] 9. Click Sign Up. You can now view and download portions of your medical record. 10. Click the Download Summary menu link to download a portable copy of your medical information. If you have questions, please visit the Frequently Asked Questions section of the [employer] website. Remember, [employer] is NOT to be used for urgent needs. For medical emergencies, dial 911. Now available from your iPhone and Android! Please provide this summary of care documentation to your next provider. Your primary care clinician is listed as Eden Harrison. If you have any questions after today's visit, please call [phone].

## 2017-10-04 NOTE — PROGRESS NOTES
Chief Complaint   Patient presents with    Anxiety     Reviewed record in preparation for visit and have obtained necessary documentation. Identified pt with two pt identifiers(name and ). Health Maintenance Due   Topic    GLAUCOMA SCREENING Q2Y          Chief Complaint   Patient presents with    Anxiety        Wt Readings from Last 3 Encounters:   10/04/17 108 lb 14.4 oz (49.4 kg)   17 110 lb 9.6 oz (50.2 kg)   17 115 lb (52.2 kg)     Temp Readings from Last 3 Encounters:   10/04/17 98.5 °F (36.9 °C) (Oral)   17 96.1 °F (35.6 °C) (Oral)   17 98 °F (36.7 °C) (Oral)     BP Readings from Last 3 Encounters:   10/04/17 100/60   17 140/78   17 100/60     Pulse Readings from Last 3 Encounters:   10/04/17 78   17 60   17 81           Learning Assessment:  :     Learning Assessment 2017   PRIMARY LEARNER Patient Patient   HIGHEST LEVEL OF EDUCATION - PRIMARY LEARNER  - 4 YEARS OF COLLEGE   BARRIERS PRIMARY LEARNER NONE NONE   CO-LEARNER CAREGIVER - No   PRIMARY LANGUAGE ENGLISH ENGLISH    NEED - No   LEARNER PREFERENCE PRIMARY READING READING   LEARNING SPECIAL TOPICS - no   ANSWERED BY Patient  patient   RELATIONSHIP SELF SELF   ASSESSMENT COMMENT - none       Depression Screening:  :     PHQ over the last two weeks 2017   Little interest or pleasure in doing things Not at all   Feeling down, depressed or hopeless Not at all   Total Score PHQ 2 0       Fall Risk Assessment:  :     Fall Risk Assessment, last 12 mths 2017   Able to walk? Yes   Fall in past 12 months? No   Fall with injury? -   Number of falls in past 12 months -   Fall Risk Score -       Abuse Screening:  :     Abuse Screening Questionnaire 2017   Do you ever feel afraid of your partner? N N   Are you in a relationship with someone who physically or mentally threatens you? N N   Is it safe for you to go home?  George Kidd       Coordination of Care Questionnaire:  :     1) Have you been to an emergency room, urgent care clinic since your last visit? no   Hospitalized since your last visit? no             2) Have you seen or consulted any other health care providers outside of [address] [place] [address] since your last visit? no  (Include any pap smears or colon screenings in this section.)    3) Do you have an Advance Directive on file? no    4) Are you interested in receiving information on Advance Directives? NO      Patient is accompanied by self I have received verbal consent from Lizzette Garcia to discuss any/all medical information while they are present in the room.

## 2017-10-04 NOTE — PATIENT INSTRUCTIONS
Continue your current medications. Continue a healthy diet. Stay physically active. Vaccine Information Statement    Influenza (Flu) Vaccine (Inactivated or Recombinant): What you need to know    Many Vaccine Information Statements are available in Japanese and other languages. See www.immunize.org/vis  Hojas de Información Sobre Vacunas están disponibles en Español y en muchos otros idiomas. Visite www.immunize.org/vis    1. Why get vaccinated? Influenza (flu) is a contagious disease that spreads around the [place] every year, usually between October and May. Flu is caused by influenza viruses, and is spread mainly by coughing, sneezing, and close contact. Anyone can get flu. Flu strikes suddenly and can last several days. Symptoms vary by age, but can include:   fever/chills   sore throat   muscle aches   fatigue   cough   headache    runny or stuffy nose    Flu can also lead to pneumonia and blood infections, and cause diarrhea and seizures in children. If you have a medical condition, such as heart or lung disease, flu can make it worse. Flu is more dangerous for some people. Infants and young children, people 72years of age and older, pregnant women, and people with certain health conditions or a weakened immune system are at greatest risk. Each year thousands of people in the [facility] die from flu, and many more are hospitalized. Flu vaccine can:   keep you from getting flu,   make flu less severe if you do get it, and   keep you from spreading flu to your family and other people. 2. Inactivated and recombinant flu vaccines    A dose of flu vaccine is recommended every flu season. Children 6 months through 6years of age may need two doses during the same flu season. Everyone else needs only one dose each flu season.        Some inactivated flu vaccines contain a very small amount of a mercury-based preservative called thimerosal. Studies have not shown thimerosal in vaccines to be harmful, but flu vaccines that do not contain thimerosal are available. There is no live flu virus in flu shots. They cannot cause the flu. There are many flu viruses, and they are always changing. Each year a new flu vaccine is made to protect against three or four viruses that are likely to cause disease in the upcoming flu season. But even when the vaccine doesnt exactly match these viruses, it may still provide some protection    Flu vaccine cannot prevent:   flu that is caused by a virus not covered by the vaccine, or   illnesses that look like flu but are not. It takes about 2 weeks for protection to develop after vaccination, and protection lasts through the flu season. 3. Some people should not get this vaccine    Tell the person who is giving you the vaccine:     If you have any severe, life-threatening allergies. If you ever had a life-threatening allergic reaction after a dose of flu vaccine, or have a severe allergy to any part of this vaccine, you may be advised not to get vaccinated. Most, but not all, types of flu vaccine contain a small amount of egg protein.  If you ever had Guillain-Barré Syndrome (also called GBS). Some people with a history of GBS should not get this vaccine. This should be discussed with your doctor.  If you are not feeling well. It is usually okay to get flu vaccine when you have a mild illness, but you might be asked to come back when you feel better. 4. Risks of a vaccine reaction    With any medicine, including vaccines, there is a chance of reactions. These are usually mild and go away on their own, but serious reactions are also possible. Most people who get a flu shot do not have any problems with it.      Minor problems following a flu shot include:    soreness, redness, or swelling where the shot was given     hoarseness   sore, red or itchy eyes   cough   fever   aches   headache   itching   fatigue  If these problems occur, they usually begin soon after the shot and last 1 or 2 days. More serious problems following a flu shot can include the following:     There may be a small increased risk of Guillain-Barré Syndrome (GBS) after inactivated flu vaccine. This risk has been estimated at 1 or 2 additional cases per million people vaccinated. This is much lower than the risk of severe complications from flu, which can be prevented by flu vaccine.  Young children who get the flu shot along with pneumococcal vaccine (PCV13) and/or DTaP vaccine at the same time might be slightly more likely to have a seizure caused by fever. Ask your doctor for more information. Tell your doctor if a child who is getting flu vaccine has ever had a seizure. Problems that could happen after any injected vaccine:      People sometimes faint after a medical procedure, including vaccination. Sitting or lying down for about 15 minutes can help prevent fainting, and injuries caused by a fall. Tell your doctor if you feel dizzy, or have vision changes or ringing in the ears.  Some people get severe pain in the shoulder and have difficulty moving the arm where a shot was given. This happens very rarely.  Any medication can cause a severe allergic reaction. Such reactions from a vaccine are very rare, estimated at about 1 in a million doses, and would happen within a few minutes to a few hours after the vaccination. As with any medicine, there is a very remote chance of a vaccine causing a serious injury or death. The safety of vaccines is always being monitored. For more information, visit: www.cdc.gov/vaccinesafety/    5. What if there is a serious reaction? What should I look for?  Look for anything that concerns you, such as signs of a severe allergic reaction, very high fever, or unusual behavior.     Signs of a severe allergic reaction can include hives, swelling of the face and throat, difficulty breathing, a fast heartbeat, dizziness, and weakness - usually within a few minutes to a few hours after the vaccination. What should I do?  If you think it is a severe allergic reaction or other emergency that cant wait, call 9-1-1 and get the person to the nearest hospital. Otherwise, call your doctor.  Reactions should be reported to the Vaccine Adverse Event Reporting System (VAERS). Your doctor should file this report, or you can do it yourself through  the VAERS web site at www.vaers. [facility].gov, or by calling 3-111.772.3849. VAERS does not give medical advice. 6. The National Vaccine Injury Compensation Program    The [facility] Vaccine Injury Compensation Program (VICP) is a federal program that was created to compensate people who may have been injured by certain vaccines. Persons who believe they may have been injured by a vaccine can learn about the program and about filing a claim by calling 9-886.470.5812 or visiting the [facility] website at www.[facility].gov/vaccinecompensation. There is a time limit to file a claim for compensation. 7. How can I learn more?  Ask your healthcare provider. He or she can give you the vaccine package insert or suggest other sources of information.  Call your local or state health department.  Contact the Centers for Disease Control and Prevention (CDC):  - Call 4-237.768.1037 (1-800-CDC-INFO) or  - Visit CDCs website at www.cdc.gov/flu    Vaccine Information Statement   Inactivated Influenza Vaccine   8/7/2015  42 LEO Jones [id number]    Department of Health and Human Services  Centers for Disease Control and Prevention    Office Use Only

## 2017-10-04 NOTE — PROGRESS NOTES
Subjective:     Chief Complaint   Patient presents with    Anxiety     She  is a 78y.o. year old female who presents for evaluation. Here for follow up of medical conditions. Will get flu shot today. Lexapro does help her. Has shaking in hands. Has chronic facial rash and wants renewal of Clindamycin topical treatment. Historical Data    Past Medical History:   Diagnosis Date    Adverse effect of anesthesia     decreased BP with nitrous oxide    Chronic kidney disease     kidney stones    Chronic pain     neuropathy - burning feeling    Degenerative disc disease     Hypothyroidism     Ill-defined condition     genital warts    Ill-defined condition     neuropathy in torso    Ill-defined condition     osteopenia    Ill-defined condition     stigmatism right eye    Ill-defined condition     IBS    Ill-defined condition     phlebitis    Ill-defined condition     hx chickenpox/shingles    Ill-defined condition     hx double pneumonia    Ill-defined condition     \"stomach moved over after fall\" per pt per MD    Ill-defined condition     as child chronic tonsillitis/strep/gets infections easily per pt    Lumbar stenosis     Rheumatic fever     Thromboembolus (HCC)     R leg    Tremor     hand/fingers        Past Surgical History:   Procedure Laterality Date    APPENDECTOMY      BREAST SURGERY PROCEDURE UNLISTED      stereotactic bx bilateral - bening    COLONOSCOPY N/A 5/23/2016    COLONOSCOPY performed by Bryanna Goss MD at [address] [place] [address] ENDOSCOPY    HX CATARACT REMOVAL  2013    bilateral    HX DILATION AND CURETTAGE      x2    HX GYN  1980    laparotomy - ovarian cysts    HX HEENT      dental surgeries    HX ORTHOPAEDIC      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         Outpatient Encounter Prescriptions as of 10/4/2017   Medication Sig Dispense Refill    hydrOXYzine HCl (ATARAX) 25 mg tablet TAKE ONE TABLET BY MOUTH THREE TIMES DAILY AS NEEDED FOR  ITCHING  FOR  UP  TO  10  DAYS 30 Tab 0    LYRICA 75 mg capsule TAKE ONE CAPSULE BY MOUTH THREE TIMES DAILY 90 Cap 2    magic mouthwash solution Magic mouth wash   Maalox  Lidocaine 2% viscous   Diphenhydramine oral solution     Pharmacy to mix equal portions of ingredients to a total volume as indicated in the dispense amount. 150 mL 2    escitalopram oxalate (LEXAPRO) 10 mg tablet Take 1 Tab by mouth daily. Indications: ANXIETY WITH DEPRESSION, POST TRAUMATIC STRESS DISORDER 30 Tab 3    calcitonin, salmon, (MIACALCIN) nasal INHALE 1 SPRAY ONCE A DAY AS DIRECTED 2 Bottle 2    levothyroxine (SYNTHROID) 50 mcg tablet Take 1 Tab by mouth Daily (before breakfast). 90 Tab 2    fexofenadine (ALLEGRA) 180 mg tablet Take  by mouth.  BLUE-GREEN ALGAE (SPIRULINA) Take 6 Tabs by mouth daily. Each tab is 550mg. No facility-administered encounter medications on file as of 10/4/2017. Allergies   Allergen Reactions    Other Food Nausea and Vomiting     Artificial sweeteners allergy. Does not use corn syrup in diet. 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 Lightheadedness and dizziness, vertigo. Heart \"beating out of my chest\". \"Inhaled a breath but could not exhale\". Was on a fluoride treatment with dentist. Amy Liriano to ER. \"Felt like I was jello\".  Nitrous Oxide Other (comments)     Blood pressure bottoms out    Other Medication Other (comments)     Avelox per pt is about the only antibiotic she can take. Not sure of the names of antibiotics she is allergic to. Caused intestinal obstruction.  Other Plant, Animal, Environmental Other (comments)     Mold spores, airborne fungal spores, trees, plants causes sinus infections and pneumonia.     Pcn [Penicillins] Other (comments) [facility] PCN does not work.  Wheat Other (comments)     Cannot digest wheat per pt. Social History     Social History    Marital status:      Spouse name: N/A    Number of children: N/A    Years of education: N/A     Occupational History    Not on file. Social History Main Topics    Smoking status: Current Every Day Smoker     Types: Cigarettes    Smokeless tobacco: Never Used      Comment: 4-5 cigs per day    Alcohol use No    Drug use: No    Sexual activity: No     Other Topics Concern    Not on file     Social History Narrative        Review of Systems  A comprehensive review of systems was negative except for that written in the HPI. Objective:     Vitals:    10/04/17 1358   BP: 100/60   Pulse: 78   Resp: 18   Temp: 98.5 °F (36.9 °C)   TempSrc: Oral   SpO2: 95%   Weight: 108 lb 14.4 oz (49.4 kg)   Height: 5' 1\" (1.549 m)     Pleasant WF. Hand tremor. Cardiac: RRR without murmurs, gallops or rubs. Lungs: Clear to ausculation. ASSESSMENT / PLAN:   1. Anxiety, generalized  · Supportive counseling.  - escitalopram oxalate (LEXAPRO) 10 mg tablet; Take 1 Tab by mouth daily. Indications: ANXIETY WITH DEPRESSION, POST TRAUMATIC STRESS DISORDER  Dispense: 30 Tab; Refill: 2    2. Tremor    - pregabalin (LYRICA) 75 mg capsule; TAKE ONE CAPSULE BY MOUTH THREE TIMES DAILY  Dispense: 90 Cap; Refill: 2    3. Facial rash    - clindamycin (CLEOCIN T) 1 % external solution; use thin film on affected area twice daily  Dispense: 60 mL; Refill: 2    4. Encounter for immunization    - Influenza virus vaccine (St[address] 80) 72 years and older ([address]31)  - Administration fee () for Medicare insured patients    Patient Instructions   Continue your current medications. Continue a healthy diet. Stay physically active. Vaccine Information Statement    Influenza (Flu) Vaccine (Inactivated or Recombinant):  What you need to know    Many Vaccine Information Statements are available in [place] and other languages. See www.immunize.org/vis  Hojas de Información Sobre Vacunas están disponibles en Español y en muchos otros idiomas. Visite www.immunize.org/vis    1. Why get vaccinated? Influenza (flu) is a contagious disease that spreads around the [place] every year, usually between October and May. Flu is caused by influenza viruses, and is spread mainly by coughing, sneezing, and close contact. Anyone can get flu. Flu strikes suddenly and can last several days. Symptoms vary by age, but can include:   fever/chills   sore throat   muscle aches   fatigue   cough   headache    runny or stuffy nose    Flu can also lead to pneumonia and blood infections, and cause diarrhea and seizures in children. If you have a medical condition, such as heart or lung disease, flu can make it worse. Flu is more dangerous for some people. Infants and young children, people 72years of age and older, pregnant women, and people with certain health conditions or a weakened immune system are at greatest risk. Each year thousands of people in the [facility] die from flu, and many more are hospitalized. Flu vaccine can:   keep you from getting flu,   make flu less severe if you do get it, and   keep you from spreading flu to your family and other people. 2. Inactivated and recombinant flu vaccines    A dose of flu vaccine is recommended every flu season. Children 6 months through 6years of age may need two doses during the same flu season. Everyone else needs only one dose each flu season. Some inactivated flu vaccines contain a very small amount of a mercury-based preservative called thimerosal. Studies have not shown thimerosal in vaccines to be harmful, but flu vaccines that do not contain thimerosal are available. There is no live flu virus in flu shots. They cannot cause the flu. There are many flu viruses, and they are always changing.  Each year a new flu vaccine is made to protect against three or four viruses that are likely to cause disease in the upcoming flu season. But even when the vaccine doesnt exactly match these viruses, it may still provide some protection    Flu vaccine cannot prevent:   flu that is caused by a virus not covered by the vaccine, or   illnesses that look like flu but are not. It takes about 2 weeks for protection to develop after vaccination, and protection lasts through the flu season. 3. Some people should not get this vaccine    Tell the person who is giving you the vaccine:     If you have any severe, life-threatening allergies. If you ever had a life-threatening allergic reaction after a dose of flu vaccine, or have a severe allergy to any part of this vaccine, you may be advised not to get vaccinated. Most, but not all, types of flu vaccine contain a small amount of egg protein.  If you ever had Guillain-Barré Syndrome (also called GBS). Some people with a history of GBS should not get this vaccine. This should be discussed with your doctor.  If you are not feeling well. It is usually okay to get flu vaccine when you have a mild illness, but you might be asked to come back when you feel better. 4. Risks of a vaccine reaction    With any medicine, including vaccines, there is a chance of reactions. These are usually mild and go away on their own, but serious reactions are also possible. Most people who get a flu shot do not have any problems with it. Minor problems following a flu shot include:    soreness, redness, or swelling where the shot was given     hoarseness   sore, red or itchy eyes   cough   fever   aches   headache   itching   fatigue  If these problems occur, they usually begin soon after the shot and last 1 or 2 days.      More serious problems following a flu shot can include the following:     There may be a small increased risk of Guillain-Barré Syndrome (GBS) after inactivated flu vaccine. This risk has been estimated at 1 or 2 additional cases per million people vaccinated. This is much lower than the risk of severe complications from flu, which can be prevented by flu vaccine.  Young children who get the flu shot along with pneumococcal vaccine (PCV13) and/or DTaP vaccine at the same time might be slightly more likely to have a seizure caused by fever. Ask your doctor for more information. Tell your doctor if a child who is getting flu vaccine has ever had a seizure. Problems that could happen after any injected vaccine:      People sometimes faint after a medical procedure, including vaccination. Sitting or lying down for about 15 minutes can help prevent fainting, and injuries caused by a fall. Tell your doctor if you feel dizzy, or have vision changes or ringing in the ears.  Some people get severe pain in the shoulder and have difficulty moving the arm where a shot was given. This happens very rarely.  Any medication can cause a severe allergic reaction. Such reactions from a vaccine are very rare, estimated at about 1 in a million doses, and would happen within a few minutes to a few hours after the vaccination. As with any medicine, there is a very remote chance of a vaccine causing a serious injury or death. The safety of vaccines is always being monitored. For more information, visit: www.cdc.gov/vaccinesafety/    5. What if there is a serious reaction? What should I look for?  Look for anything that concerns you, such as signs of a severe allergic reaction, very high fever, or unusual behavior. Signs of a severe allergic reaction can include hives, swelling of the face and throat, difficulty breathing, a fast heartbeat, dizziness, and weakness - usually within a few minutes to a few hours after the vaccination. What should I do?      If you think it is a severe allergic reaction or other emergency that cant wait, call 9-1-1 and get the person to the nearest hospital. Otherwise, call your doctor.  Reactions should be reported to the Vaccine Adverse Event Reporting System (VAERS). Your doctor should file this report, or you can do it yourself through  the VAERS web site at www.vaers. hhs.gov, or by calling 1-191.875.7227. VAERS does not give medical advice. 6. The National Vaccine Injury Compensation Program    The [facility] Vaccine Injury Compensation Program (VICP) is a federal program that was created to compensate people who may have been injured by certain vaccines. Persons who believe they may have been injured by a vaccine can learn about the program and about filing a claim by calling 3-549.696.2473 or visiting the [employer] website at www.[facility].gov/vaccinecompensation. There is a time limit to file a claim for compensation. 7. How can I learn more?  Ask your healthcare provider. He or she can give you the vaccine package insert or suggest other sources of information.  Call your local or state health department.  Contact the Centers for Disease Control and Prevention (CDC):  - Call 8-260.864.9212 (1-800-CDC-INFO) or  - Visit CDCs website at www.cdc.gov/flu    Vaccine Information Statement   Inactivated Influenza Vaccine   8/7/2015  42 LEO Wallace [id number]    Department of Health and Human Services  Centers for Disease Control and Prevention    Office Use Only             Follow-up Disposition: Not on File   Advised her to call back or return to office if symptoms worsen/change/persist.  Discussed expected course/resolution/complications of diagnosis in detail with patient. Medication risks/benefits/costs/interactions/alternatives discussed with patient. She was given an after visit summary which includes diagnoses, current medications, & vitals. She expressed understanding with the diagnosis and plan.

## 2017-10-16 DIAGNOSIS — F41.1 ANXIETY, GENERALIZED: ICD-10-CM

## 2017-10-16 RX ORDER — ESCITALOPRAM OXALATE 10 MG/1
TABLET ORAL
Qty: 30 TAB | Refills: 3 | Status: SHIPPED | OUTPATIENT
Start: 2017-10-16 | End: 2018-01-24 | Stop reason: ALTCHOICE

## 2017-11-24 ENCOUNTER — HOSPITAL ENCOUNTER (OUTPATIENT)
Dept: MAMMOGRAPHY | Age: 77
Discharge: HOME | End: 2017-11-24
Attending: INTERNAL MEDICINE
Payer: MEDICARE

## 2017-11-24 DIAGNOSIS — Z12.31 VISIT FOR SCREENING MAMMOGRAM: ICD-10-CM

## 2017-11-24 PROCEDURE — 77067 SCR MAMMO BI INCL CAD: CPT

## 2017-12-20 DIAGNOSIS — L50.9 URTICARIA: ICD-10-CM

## 2017-12-20 RX ORDER — HYDROXYZINE 25 MG/1
TABLET, FILM COATED ORAL
Qty: 30 TAB | Refills: 0 | Status: SHIPPED | OUTPATIENT
Start: 2017-12-20 | End: 2018-05-06 | Stop reason: SDUPTHER

## 2018-01-19 DIAGNOSIS — R25.1 TREMOR: ICD-10-CM

## 2018-01-19 RX ORDER — PREGABALIN 75 MG/1
CAPSULE ORAL
Qty: 90 CAP | Refills: 2 | Status: SHIPPED | OUTPATIENT
Start: 2018-01-19 | End: 2018-04-16 | Stop reason: SDUPTHER

## 2018-01-24 ENCOUNTER — TELEPHONE (OUTPATIENT)
Dept: INTERNAL MEDICINE CLINIC | Age: 78
End: 2018-01-24

## 2018-01-24 NOTE — TELEPHONE ENCOUNTER
Pt wants you to know that she stop taking lexapro  And she saw a neurologist dr Gordy Davis  whom she saw about 8 yrs ago yesterday   And she had some tremours   Between nereyda multani and eduardo pt has an appt  On [id number] to see dr Mary Nava

## 2018-02-05 ENCOUNTER — OFFICE VISIT (OUTPATIENT)
Dept: INTERNAL MEDICINE CLINIC | Age: 78
End: 2018-02-05

## 2018-02-05 VITALS
HEART RATE: 69 BPM | HEIGHT: 61 IN | WEIGHT: 112.3 LBS | RESPIRATION RATE: 16 BRPM | SYSTOLIC BLOOD PRESSURE: 130 MMHG | BODY MASS INDEX: 21.2 KG/M2 | DIASTOLIC BLOOD PRESSURE: 70 MMHG | TEMPERATURE: 97 F | OXYGEN SATURATION: 98 %

## 2018-02-05 DIAGNOSIS — E03.8 OTHER SPECIFIED HYPOTHYROIDISM: Primary | ICD-10-CM

## 2018-02-05 DIAGNOSIS — R11.0 NAUSEA: ICD-10-CM

## 2018-02-05 DIAGNOSIS — R25.1 TREMOR: ICD-10-CM

## 2018-02-05 DIAGNOSIS — M51.36 DEGENERATIVE DISC DISEASE, LUMBAR: ICD-10-CM

## 2018-02-05 DIAGNOSIS — E78.5 HYPERLIPIDEMIA, UNSPECIFIED HYPERLIPIDEMIA TYPE: ICD-10-CM

## 2018-02-05 DIAGNOSIS — R68.89 MULTIPLE SOMATIC COMPLAINTS: ICD-10-CM

## 2018-02-05 DIAGNOSIS — Z00.00 WELL ADULT EXAM: ICD-10-CM

## 2018-02-05 NOTE — MR AVS SNAPSHOT
[facility], [address] [place] [address] 
[phone] Patient: Michelle Godfrey MRN: [id number] FAR:[id number] Visit Information Date & Time Provider Department Dept. Phone Encounter #  
 2/5/2018  1:30 PM [place] [id number] [phone] Follow-up Instructions Return in about 4 months (around 6/5/2018) for Medication re-evaluation. Upcoming Health Maintenance Date Due  
 GLAUCOMA SCREENING Q2Y 10/1/2015 MEDICARE YEARLY EXAM 2/7/2018 DTaP/Tdap/Td series (2 - Td) 4/19/2026 COLONOSCOPY 5/23/2026 Allergies as of 2/5/2018  Review Complete On: 2/5/2018 By: Leoncio Gross MD  
  
 Severity Noted Reaction Type Reactions Other Food  05/20/2016    Nausea and Vomiting Artificial sweeteners allergy. Does not use corn syrup in diet. 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 Lightheadedness and dizziness, vertigo. Heart \"beating out of my chest\". \"Inhaled a breath but could not exhale\". Was on a fluoride treatment with dentist. Devonte Labor to ER. \"[place] like I was jello\". Nitrous Oxide  12/08/2014   Side Effect Other (comments) Blood pressure bottoms out Other Medication  05/20/2016    Other (comments) Avelox per pt is about the only antibiotic she can take. Not sure of the names of antibiotics she is allergic to. Caused intestinal obstruction. Other Plant, Animal, Environmental  05/20/2016    Other (comments) Mold spores, airborne fungal spores, trees, plants causes sinus infections and pneumonia. Pcn [Penicillins]  05/20/2016    Other (comments) State PCN does not work. Wheat  05/20/2016    Other (comments) Cannot digest wheat per pt. Current Immunizations  Reviewed on 10/4/2017 Name Date Influenza High Dose Vaccine PF 10/4/2017 Pneumococcal Conjugate (PCV-13) 4/19/2016 Pneumococcal Polysaccharide (PPSV-23) 8/1/2012 Tdap 4/19/2016 Zoster Vaccine, Live 9/1/2015 Not reviewed this visit You Were Diagnosed With   
  
 Codes Comments Other specified hypothyroidism    -  Primary ICD-10-CM: E03.8 ICD-9-CM: 244.8 Hyperlipidemia, unspecified hyperlipidemia type     ICD-10-CM: E78.5 ICD-9-CM: 272.4 Tremor     ICD-10-CM: R25.1 ICD-9-CM: 781.0 Degenerative disc disease, lumbar     ICD-10-CM: M51.36 
ICD-9-CM: 722.52 Nausea     ICD-10-CM: R11.0 ICD-9-CM: 787.02   
 Multiple somatic complaints     ICD-10-CM: R68.89 ICD-9-CM: 780.99 Well adult exam     ICD-10-CM: Z00.00 ICD-9-CM: V70.0 Vitals BP Pulse Temp Resp Height(growth percentile) Weight(growth percentile) 130/70 (BP 1 Location: Right arm, BP Patient Position: Sitting) 69 97 °F (36.1 °C) (Oral) 16 5' 1\" (1.549 m) 112 lb 4.8 oz (50.9 kg) SpO2 BMI OB Status Smoking Status 98% 21.22 kg/m2 Postmenopausal Current Every Day Smoker BMI and BSA Data Body Mass Index Body Surface Area  
 21.22 kg/m 2 1.48 m 2 Preferred Pharmacy Pharmacy Name Phone [email or web address] [place] [phone] Your Updated Medication List  
  
   
This list is accurate as of: 2/5/18  1:58 PM.  Always use your most recent med list.  
  
  
  
  
 calcitonin (salmon) nasal  
Commonly known as:  MIACALCIN  
INHALE 1 SPRAY ONCE A DAY AS DIRECTED  
  
 clindamycin 1 % external solution Commonly known as:  CLEOCIN T  
use thin film on affected area twice daily  
  
 fexofenadine 180 mg tablet Commonly known as:  Gilbert Valderrama Take  by mouth. hydrOXYzine HCl 25 mg tablet Commonly known as:  ATARAX TAKE ONE TABLET BY MOUTH THREE TIMES DAILY AS NEEDED FOR ITCHING FOR UP TO 10 DAYS  
  
 levothyroxine 50 mcg tablet Commonly known as:  SYNTHROID  
 Take 1 Tab by mouth Daily (before breakfast). LYRICA 75 mg capsule Generic drug:  pregabalin TAKE ONE CAPSULE BY MOUTH THREE TIMES DAILY  
  
 magic mouthwash solution Magic mouth wash  Maalox Lidocaine 2% viscous  Diphenhydramine oral solution   Pharmacy to mix equal portions of ingredients to a total volume as indicated in the dispense amount. NASALCROM NA  
by Nasal route. SPIRULINA Take 6 Tabs by mouth daily. Each tab is 550mg. We Performed the Following CBC WITH AUTOMATED DIFF [03537 CPT(R)] LIPID PANEL [34874 CPT(R)] METABOLIC PANEL, COMPREHENSIVE [00016 CPT(R)] TSH REFLEX TO T4 [04468 CPT(R)] URINALYSIS W/ RFLX MICROSCOPIC [92527 CPT(R)] Follow-up Instructions Return in about 4 months (around 6/5/2018) for Medication re-evaluation. Patient Instructions Eat a balanced diet with plenty of fruits and vegetables. Stay physically active. Continue your current medications. Introducing [facility]! Raimundo Cedeño introduces [employer] patient portal. Now you can access parts of your medical record, email your doctor's office, and request medication refills online. 1. In your internet browser, go to [email or web address] 2. Click on the First Time User? Click Here link in the Sign In box. You will see the New Member Sign Up page. 3. Enter your [employer] Access Code exactly as it appears below. You will not need to use this code after youve completed the sign-up process. If you do not sign up before the expiration date, you must request a new code. · [employer] Access Code: [email or web address] Expires: 5/6/2018  1:58 PM 
 
4. Enter the last four digits of your Social Security Number (xxxx) and Date of Birth (mm/dd/yyyy) as indicated and click Submit. You will be taken to the next sign-up page. 5. Create a [employer] ID.  This will be your [employer] login ID and cannot be changed, so think of one that is secure and easy to remember. 6. Create a [employer] password. You can change your password at any time. 7. Enter your Password Reset Question and Answer. This can be used at a later time if you forget your password. 8. Enter your e-mail address. You will receive e-mail notification when new information is available in [address] 9. Click Sign Up. You can now view and download portions of your medical record. 10. Click the Download Summary menu link to download a portable copy of your medical information. If you have questions, please visit the Frequently Asked Questions section of the [employer] website. Remember, [employer] is NOT to be used for urgent needs. For medical emergencies, dial 911. Now available from your iPhone and Android! Please provide this summary of care documentation to your next provider. Your primary care clinician is listed as Jacques Manning. If you have any questions after today's visit, please call [phone].

## 2018-02-05 NOTE — PROGRESS NOTES
Subjective:     Chief Complaint   Patient presents with    Tremors     She  is a 78y.o. year old female who presents for evaluation. Taking Lyrica for tremor. Helps a \"little bit\". Feels that Xanax helps her more. Took Lexapro for a while but after reading list of side effects decided to stop taking it. Around [place] had three days of an \"attack\" of burning skin. Internally was hot but extremities were cold. Multiple somatic complaints.       Historical Data    Past Medical History:   Diagnosis Date    Adverse effect of anesthesia     decreased BP with nitrous oxide    Chronic kidney disease     kidney stones    Chronic pain     neuropathy - burning feeling    Degenerative disc disease     Hypothyroidism     Ill-defined condition     genital warts    Ill-defined condition     neuropathy in torso    Ill-defined condition     osteopenia    Ill-defined condition     stigmatism right eye    Ill-defined condition     IBS    Ill-defined condition     phlebitis    Ill-defined condition     hx chickenpox/shingles    Ill-defined condition     hx double pneumonia    Ill-defined condition     \"stomach moved over after fall\" per pt per MD    Ill-defined condition     as child chronic tonsillitis/strep/gets infections easily per pt    Lumbar stenosis     Rheumatic fever     Thromboembolus (HCC)     R leg    Tremor     hand/fingers        Past Surgical History:   Procedure Laterality Date    APPENDECTOMY      BREAST SURGERY PROCEDURE UNLISTED      stereotactic bx bilateral - bening    COLONOSCOPY N/A 5/23/2016    COLONOSCOPY performed by Brigitte Gay MD at [facility] ENDOSCOPY    HX BREAST BIOPSY Right     Stereo Bx over 30yrs ago - BENIGN    HX BREAST BIOPSY Left     Surgical Bx Over 30yrs ago - BENIGN    HX CATARACT REMOVAL  2013    bilateral    HX DILATION AND CURETTAGE      x2    HX GYN  1980    laparotomy - ovarian cysts    HX HEENT      dental surgeries    HX ORTHOPAEDIC 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 Outpatient Encounter Prescriptions as of 2/5/2018   Medication Sig Dispense Refill    CROMOLYN SODIUM (NASALCROM NA) by Nasal route.  LYRICA 75 mg capsule TAKE ONE CAPSULE BY MOUTH THREE TIMES DAILY 90 Cap 2    hydrOXYzine HCl (ATARAX) 25 mg tablet TAKE ONE TABLET BY MOUTH THREE TIMES DAILY AS NEEDED FOR ITCHING FOR UP TO 10 DAYS 30 Tab 0    clindamycin (CLEOCIN T) 1 % external solution use thin film on affected area twice daily 60 mL 2    calcitonin, salmon, (MIACALCIN) nasal INHALE 1 SPRAY ONCE A DAY AS DIRECTED 2 Bottle 2    levothyroxine (SYNTHROID) 50 mcg tablet Take 1 Tab by mouth Daily (before breakfast). 90 Tab 2    fexofenadine (ALLEGRA) 180 mg tablet Take  by mouth.  BLUE-GREEN ALGAE (SPIRULINA) Take 6 Tabs by mouth daily. Each tab is 550mg.  magic mouthwash solution Magic mouth wash   Maalox  Lidocaine 2% viscous   Diphenhydramine oral solution     Pharmacy to mix equal portions of ingredients to a total volume as indicated in the dispense amount. 150 mL 2     No facility-administered encounter medications on file as of 2/5/2018. Allergies   Allergen Reactions    Other Food Nausea and Vomiting     Artificial sweeteners allergy. Does not use corn syrup in diet. 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 Lightheadedness and dizziness, vertigo. Heart \"beating out of my chest\". \"Inhaled a breath but could not exhale\". Was on a fluoride treatment with dentist. Devonte Labor to ER. \"[place]atherby like I was jello\".  Nitrous Oxide Other (comments)     Blood pressure bottoms out    Other Medication Other (comments)     Avelox per pt is about the only antibiotic she can take.  Not sure of the names of antibiotics she is allergic to. Caused intestinal obstruction.  Other Plant, Animal, Environmental Other (comments)     Mold spores, airborne fungal spores, trees, plants causes sinus infections and pneumonia.  Pcn [Penicillins] Other (comments)     State PCN does not work.  Wheat Other (comments)     Cannot digest wheat per pt. Social History     Social History    Marital status:      Spouse name: N/A    Number of children: N/A    Years of education: N/A     Occupational History    Not on file. Social History Main Topics    Smoking status: Current Every Day Smoker     Types: Cigarettes    Smokeless tobacco: Never Used      Comment: 4-5 cigs per day    Alcohol use No    Drug use: No    Sexual activity: No     Other Topics Concern    Not on file     Social History Narrative        Review of Systems  A comprehensive review of systems was negative except for that written in the HPI. Objective:     Vitals:    02/05/18 1335   BP: 130/70   Pulse: 69   Resp: 16   Temp: 97 °F (36.1 °C)   TempSrc: Oral   SpO2: 98%   Weight: 112 lb 4.8 oz (50.9 kg)   Height: 5' 1\" (1.549 m)     Pleasant elderly female in no acute distress. Neck: Supple  Cardiac: RRR without murmurs, gallops or rubs. Lungs: Clear to auscultation but slightly diminished in bases. Abdomen: Benign. Neuro: Tremor present. ASSESSMENT / PLAN:   1. Other specified hypothyroidism  · Re-assess level  - TSH REFLEX TO T4    2. Hyperlipidemia, unspecified hyperlipidemia type  · Mediterranean diet. - LIPID PANEL    3. Tremor  · Uses Lyrica    4. Degenerative disc disease, lumbar      5. Nausea  · Evaluate for metabolic derangement.  - METABOLIC PANEL, COMPREHENSIVE    6. Multiple somatic complaints  · Psychiatric overlay. 7. Well adult exam    - CBC WITH AUTOMATED DIFF  - URINALYSIS W/ RFLX MICROSCOPIC             Follow-up Disposition:  Return in about 4 months (around 6/5/2018) for Medication re-evaluation.    Advised her to call back or return to office if symptoms worsen/change/persist.  Discussed expected course/resolution/complications of diagnosis in detail with patient. Medication risks/benefits/costs/interactions/alternatives discussed with patient. She was given an after visit summary which includes diagnoses, current medications, & vitals. She expressed understanding with the diagnosis and plan.

## 2018-02-05 NOTE — PROGRESS NOTES
Chief Complaint   Patient presents with    Tremors     F/u  Reviewed record in preparation for visit and have obtained necessary documentation. Identified pt with two pt identifiers(name and ). Health Maintenance Due   Topic    GLAUCOMA SCREENING Q2Y          Chief Complaint   Patient presents with    Tremors        Wt Readings from Last 3 Encounters:   18 112 lb 4.8 oz (50.9 kg)   10/04/17 108 lb 14.4 oz (49.4 kg)   17 110 lb 9.6 oz (50.2 kg)     Temp Readings from Last 3 Encounters:   18 97 °F (36.1 °C) (Oral)   10/04/17 98.5 °F (36.9 °C) (Oral)   17 96.1 °F (35.6 °C) (Oral)     BP Readings from Last 3 Encounters:   18 130/70   10/04/17 100/60   17 140/78     Pulse Readings from Last 3 Encounters:   18 69   10/04/17 78   17 60           Learning Assessment:  :     Learning Assessment 2017   PRIMARY LEARNER Patient Patient   HIGHEST LEVEL OF EDUCATION - PRIMARY LEARNER  - 4 YEARS OF COLLEGE   BARRIERS PRIMARY LEARNER NONE NONE   CO-LEARNER CAREGIVER - No   PRIMARY LANGUAGE ENGLISH ENGLISH    NEED - No   LEARNER PREFERENCE PRIMARY READING READING   LEARNING SPECIAL TOPICS - no   ANSWERED BY Patient  patient   RELATIONSHIP SELF SELF   ASSESSMENT COMMENT - none       Depression Screening:  :     PHQ over the last two weeks 2017   Little interest or pleasure in doing things Not at all   Feeling down, depressed or hopeless Not at all   Total Score PHQ 2 0       Fall Risk Assessment:  :     Fall Risk Assessment, last 12 mths 2017   Able to walk? Yes   Fall in past 12 months? No   Fall with injury? -   Number of falls in past 12 months -   Fall Risk Score -       Abuse Screening:  :     Abuse Screening Questionnaire 2017   Do you ever feel afraid of your partner? N N   Are you in a relationship with someone who physically or mentally threatens you? N N   Is it safe for you to go home?  Mulugeta Mathis of Care Questionnaire:  :     1) Have you been to an emergency room, urgent care clinic since your last visit? no   Hospitalized since your last visit? no             2) Have you seen or consulted any other health care providers outside of [address] [place] [address] since your last visit? yes  (Include any pap smears or colon screenings in this section.)    3) Do you have an Advance Directive on file? no    4) Are you interested in receiving information on Advance Directives? NO      Patient is accompanied by self I have received verbal consent from Yoandy Garcia to discuss any/all medical information while they are present in the room. Reviewed record  In preparation for visit and have obtained necessary documentation.

## 2018-02-06 LAB
ALBUMIN SERPL-MCNC: 4.2 G/DL (ref 3.5–4.8)
ALBUMIN/GLOB SERPL: 1.7 {RATIO} (ref 1.2–2.2)
ALP SERPL-CCNC: 71 IU/L (ref 39–117)
ALT SERPL-CCNC: 16 IU/L (ref 0–32)
APPEARANCE UR: CLEAR
AST SERPL-CCNC: 20 IU/L (ref 0–40)
BACTERIA #/AREA URNS HPF: ABNORMAL /[HPF]
BASOPHILS # BLD AUTO: 0 X10E3/UL (ref 0–0.2)
BASOPHILS NFR BLD AUTO: 0 %
BILIRUB SERPL-MCNC: 0.2 MG/DL (ref 0–1.2)
BILIRUB UR QL STRIP: NEGATIVE
BUN SERPL-MCNC: 25 MG/DL (ref 8–27)
BUN/CREAT SERPL: 26 (ref 12–28)
CALCIUM SERPL-MCNC: 9.7 MG/DL (ref 8.7–10.3)
CASTS URNS QL MICRO: ABNORMAL /LPF
CHLORIDE SERPL-SCNC: 102 MMOL/L (ref 96–106)
CHOLEST SERPL-MCNC: 202 MG/DL (ref 100–199)
CO2 SERPL-SCNC: 27 MMOL/L (ref 18–29)
COLOR UR: YELLOW
CREAT SERPL-MCNC: 0.98 MG/DL (ref 0.57–1)
EOSINOPHIL # BLD AUTO: 0 X10E3/UL (ref 0–0.4)
EOSINOPHIL NFR BLD AUTO: 0 %
EPI CELLS #/AREA URNS HPF: ABNORMAL /HPF
ERYTHROCYTE [DISTWIDTH] IN BLOOD BY AUTOMATED COUNT: 15 % (ref 12.3–15.4)
GFR SERPLBLD CREATININE-BSD FMLA CKD-EPI: 57 ML/MIN/1.73
GFR SERPLBLD CREATININE-BSD FMLA CKD-EPI: 65 ML/MIN/1.73
GLOBULIN SER CALC-MCNC: 2.5 G/DL (ref 1.5–4.5)
GLUCOSE SERPL-MCNC: 83 MG/DL (ref 65–99)
GLUCOSE UR QL: NEGATIVE
HCT VFR BLD AUTO: 37.6 % (ref 34–46.6)
HDLC SERPL-MCNC: 53 MG/DL
HGB BLD-MCNC: 12.4 G/DL (ref 11.1–15.9)
HGB UR QL STRIP: NEGATIVE
IMM GRANULOCYTES # BLD: 0 X10E3/UL (ref 0–0.1)
IMM GRANULOCYTES NFR BLD: 0 %
INTERPRETATION, 910389: NORMAL
INTERPRETATION: NORMAL
KETONES UR QL STRIP: NEGATIVE
LDLC SERPL CALC-MCNC: 103 MG/DL (ref 0–99)
LEUKOCYTE ESTERASE UR QL STRIP: ABNORMAL
LYMPHOCYTES # BLD AUTO: 1.9 X10E3/UL (ref 0.7–3.1)
LYMPHOCYTES NFR BLD AUTO: 22 %
MCH RBC QN AUTO: 30 PG (ref 26.6–33)
MCHC RBC AUTO-ENTMCNC: 33 G/DL (ref 31.5–35.7)
MCV RBC AUTO: 91 FL (ref 79–97)
MICRO URNS: ABNORMAL
MONOCYTES # BLD AUTO: 0.7 X10E3/UL (ref 0.1–0.9)
MONOCYTES NFR BLD AUTO: 9 %
MUCOUS THREADS URNS QL MICRO: PRESENT
NEUTROPHILS # BLD AUTO: 5.7 X10E3/UL (ref 1.4–7)
NEUTROPHILS NFR BLD AUTO: 69 %
NITRITE UR QL STRIP: NEGATIVE
PDF IMAGE, 910387: NORMAL
PH UR STRIP: 6 [PH] (ref 5–7.5)
PLATELET # BLD AUTO: 246 X10E3/UL (ref 150–379)
POTASSIUM SERPL-SCNC: 4.5 MMOL/L (ref 3.5–5.2)
PROT SERPL-MCNC: 6.7 G/DL (ref 6–8.5)
PROT UR QL STRIP: NEGATIVE
RBC # BLD AUTO: 4.14 X10E6/UL (ref 3.77–5.28)
RBC #/AREA URNS HPF: ABNORMAL /HPF
SODIUM SERPL-SCNC: 140 MMOL/L (ref 134–144)
SP GR UR: 1.01 (ref 1–1.03)
TRIGL SERPL-MCNC: 231 MG/DL (ref 0–149)
TSH SERPL DL<=0.005 MIU/L-ACNC: 2.45 UIU/ML (ref 0.45–4.5)
UROBILINOGEN UR STRIP-MCNC: 0.2 MG/DL (ref 0.2–1)
VLDLC SERPL CALC-MCNC: 46 MG/DL (ref 5–40)
WBC # BLD AUTO: 8.4 X10E3/UL (ref 3.4–10.8)
WBC #/AREA URNS HPF: ABNORMAL /HPF

## 2018-04-16 DIAGNOSIS — R25.1 TREMOR: ICD-10-CM

## 2018-04-16 RX ORDER — PREGABALIN 75 MG/1
CAPSULE ORAL
Qty: 90 CAP | Refills: 2 | Status: SHIPPED | OUTPATIENT
Start: 2018-04-16 | End: 2018-07-06 | Stop reason: SDUPTHER

## 2018-05-06 DIAGNOSIS — L50.9 URTICARIA: ICD-10-CM

## 2018-05-07 RX ORDER — HYDROXYZINE 25 MG/1
TABLET, FILM COATED ORAL
Qty: 30 TAB | Refills: 0 | Status: SHIPPED | OUTPATIENT
Start: 2018-05-07 | End: 2018-08-02 | Stop reason: SDUPTHER

## 2018-06-06 ENCOUNTER — OFFICE VISIT (OUTPATIENT)
Dept: INTERNAL MEDICINE CLINIC | Age: 78
End: 2018-06-06

## 2018-06-06 VITALS
WEIGHT: 113.1 LBS | TEMPERATURE: 96.4 F | HEART RATE: 74 BPM | OXYGEN SATURATION: 98 % | HEIGHT: 61 IN | RESPIRATION RATE: 14 BRPM | BODY MASS INDEX: 21.35 KG/M2 | DIASTOLIC BLOOD PRESSURE: 60 MMHG | SYSTOLIC BLOOD PRESSURE: 110 MMHG

## 2018-06-06 DIAGNOSIS — E78.5 HYPERLIPIDEMIA, UNSPECIFIED HYPERLIPIDEMIA TYPE: ICD-10-CM

## 2018-06-06 DIAGNOSIS — Z00.00 MEDICARE ANNUAL WELLNESS VISIT, SUBSEQUENT: Primary | ICD-10-CM

## 2018-06-06 DIAGNOSIS — E03.8 OTHER SPECIFIED HYPOTHYROIDISM: ICD-10-CM

## 2018-06-06 RX ORDER — DIAZEPAM 2 MG/1
TABLET ORAL
COMMUNITY
Start: 2018-05-29

## 2018-06-06 NOTE — PROGRESS NOTES
SA comprehensive review of systems was negative except for that written in the HPI.ubjective:     Chief Complaint   Patient presents with    Medication Evaluation     She  is a 78y.o. year old female who presents for evaluation. Multiple concerns:  · Taking new shingles shot. Had some aching in back of head and shoulders. · Due for Medicare wellness exam.  · Still problems with diverticulitis. · Has processing issues. · Has some balance issues.   · Still with constipation      Historical Data    Past Medical History:   Diagnosis Date    Adverse effect of anesthesia     decreased BP with nitrous oxide    Chronic kidney disease     kidney stones    Chronic pain     neuropathy - burning feeling    Degenerative disc disease     Hypothyroidism     Ill-defined condition     genital warts    Ill-defined condition     neuropathy in torso    Ill-defined condition     osteopenia    Ill-defined condition     stigmatism right eye    Ill-defined condition     IBS    Ill-defined condition     phlebitis    Ill-defined condition     hx chickenpox/shingles    Ill-defined condition     hx double pneumonia    Ill-defined condition     \"stomach moved over after fall\" per pt per MD    Ill-defined condition     as child chronic tonsillitis/strep/gets infections easily per pt    Lumbar stenosis     Rheumatic fever     Thromboembolus (HCC)     R leg    Tremor     hand/fingers        Past Surgical History:   Procedure Laterality Date    APPENDECTOMY      BREAST SURGERY PROCEDURE UNLISTED      stereotactic bx bilateral - bening    COLONOSCOPY N/A 5/23/2016    COLONOSCOPY performed by Santi Pimentel MD at [facility] ENDOSCOPY    HX BREAST BIOPSY Right     Stereo Bx over 30yrs ago - BENIGN    HX BREAST BIOPSY Left     Surgical Bx Over 30yrs ago - BENIGN    HX CATARACT REMOVAL  2013    bilateral    HX DILATION AND CURETTAGE      x2    HX GYN  1980    laparotomy - ovarian cysts    HX HEENT      dental surgeries    HX ORTHOPAEDIC      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 Outpatient Encounter Prescriptions as of 6/6/2018   Medication Sig Dispense Refill    diazePAM (VALIUM) 2 mg tablet       LYRICA 75 mg capsule TAKE ONE CAPSULE BY MOUTH THREE TIMES DAILY 90 Cap 2    levothyroxine (SYNTHROID) 50 mcg tablet TAKE ONE TABLET BY MOUTH ONCE DAILY BEFORE BREAKFAST 90 Tab 1    clindamycin (CLEOCIN T) 1 % external solution use thin film on affected area twice daily 60 mL 2    BLUE-GREEN ALGAE (SPIRULINA) Take 6 Tabs by mouth daily. Each tab is 550mg.  hydrOXYzine HCl (ATARAX) 25 mg tablet TAKE ONE TABLET BY MOUTH THREE TIMES DAILY AS NEEDED FOR  ITCHING  FOR  UP  TO  10  DAYS 30 Tab 0    calcitonin, salmon, (MIACALCIN) nasal USE ONE SPRAY IN NOSTRIL ONCE DAILY 6 Bottle 3    CROMOLYN SODIUM (NASALCROM NA) by Nasal route.  magic mouthwash solution Magic mouth wash   Maalox  Lidocaine 2% viscous   Diphenhydramine oral solution     Pharmacy to mix equal portions of ingredients to a total volume as indicated in the dispense amount. 150 mL 2    fexofenadine (ALLEGRA) 180 mg tablet Take  by mouth. No facility-administered encounter medications on file as of 6/6/2018. Allergies   Allergen Reactions    Other Food Nausea and Vomiting     Artificial sweeteners allergy. Does not use corn syrup in diet. 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 Lightheadedness and dizziness, vertigo. Heart \"beating out of my chest\". \"Inhaled a breath but could not exhale\". Was on a fluoride treatment with dentist. Alfonza Grapes to ER. \"[place] like I was jello\".     Nitrous Oxide Other (comments)     Blood pressure bottoms out    Other Medication Other (comments)     Avelox per pt is about the only antibiotic she can take. Not sure of the names of antibiotics she is allergic to. Caused intestinal obstruction.  Other Plant, Animal, Environmental Other (comments)     Mold spores, airborne fungal spores, trees, plants causes sinus infections and pneumonia.  Pcn [Penicillins] Other (comments)     State PCN does not work.  Wheat Other (comments)     Cannot digest wheat per pt. Social History     Social History    Marital status:      Spouse name: N/A    Number of children: N/A    Years of education: N/A     Occupational History    Not on file. Social History Main Topics    Smoking status: Current Every Day Smoker     Types: Cigarettes    Smokeless tobacco: Never Used      Comment: 4-5 cigs per day    Alcohol use No    Drug use: No    Sexual activity: No     Other Topics Concern    Not on file     Social History Narrative          Review of Systems  A comprehensive review of systems was negative except for that written in the HPI. Objective:     Vitals:    06/06/18 1336   BP: 110/60   Pulse: 74   Resp: 14   Temp: 96.4 °F (35.8 °C)   TempSrc: Oral   SpO2: 98%   Weight: 113 lb 1.6 oz (51.3 kg)   Height: 5' 1\" (1.549 m)     Pleasant but loquacious WF. Neck: Supple. Cardiac: RRR without murmurs, gallops or rubs. Lungs: Clear to auscultation. Abdomen: Non-tender. ASSESSMENT / PLAN:   1. Medicare annual wellness visit, subsequent  · Completed    2. Other specified hypothyroidism  · Continue replacement therapy. 3. Hyperlipidemia, unspecified hyperlipidemia type  · Continue dietary efforts. 4. Multiple somatic complaints    Patient Instructions   Eat a well rounded diet. Stay physically active. Continue your current medications. Medicare Wellness Visit, Female    The best way to live healthy is to have a lifestyle where you eat a well-balanced diet, exercise regularly, limit alcohol use, and quit all forms of tobacco/nicotine, if applicable. Regular preventive services are another way to keep healthy. Preventive services (vaccines, screening tests, monitoring & exams) can help personalize your care plan, which helps you manage your own care. Screening tests can find health problems at the earliest stages, when they are easiest to treat. [facility] follows the current, evidence-based guidelines published by the [facility] [place] (USPSTF) when recommending preventive services for our patients. Because we follow these guidelines, sometimes recommendations change over time as research supports it. (For example, mammograms used to be recommended annually. Even though Medicare will still pay for an annual mammogram, the newer guidelines recommend a mammogram every two years for women of average risk.)    Of course, you and your provider may decide to screen more often for some diseases, based on your risk and co-morbidities (chronic disease you are already diagnosed with). Preventive services for you include:    - Medicare offers their members a free annual wellness visit, which is time for you and your primary care provider to discuss and plan for your preventive service needs. Take advantage of this benefit every year!    -All people over age 72 should receive the recommended pneumonia vaccines. Current USPSTF guidelines recommend a series of two vaccines for the best pneumonia protection.     -All adults should have a yearly flu vaccine and a tetanus vaccine every 10 years. All adults age 61 years should receive a shingles vaccine once in their lifetime.      -A bone mass density test is recommended when a woman turns 65 to screen for osteoporosis. This test is only recommended once as a screening. Some providers will use this same test as a disease monitoring tool if you already have osteoporosis.     -All adults age 38-68 years who are overweight should have a diabetes screening test once every three years.     -Other screening tests & preventive services for persons with diabetes include: an eye exam to screen for diabetic retinopathy, a kidney function test, a foot exam, and stricter control over your cholesterol.     -Cardiovascular screening for adults with routine risk involves an electrocardiogram (ECG) at intervals determined by the provider.     -Colorectal cancer screenings should be done for adults age 54-65 years with normal risk. There are a number of acceptable methods of screening for this type of cancer. Each test has its own benefits and drawbacks. Discuss with your provider what is most appropriate for you during your annual wellness visit. The different tests include: colonoscopy (considered the best screening method), a fecal occult blood test, a fecal DNA test, and sigmoidoscopy. -Breast cancer screenings are recommended every other year for women of normal risk age 54-69 years.     -Cervical cancer screenings for women over age 72 are only recommended with certain risk factors.     -All adults born between [facility] should be screened once for Hepatitis C. Here is a list of your current Health Maintenance items (your personalized list of preventive services) with a due date: There are no preventive care reminders to display for this patient. Follow-up Disposition:  Return in about 4 months (around 10/6/2018) for Medication re-evaluation. Advised her to call back or return to office if symptoms worsen/change/persist.  Discussed expected course/resolution/complications of diagnosis in detail with patient. Medication risks/benefits/costs/interactions/alternatives discussed with patient. She was given an after visit summary which includes diagnoses, current medications, & vitals. She expressed understanding with the diagnosis and plan.                    This is the Subsequent Medicare Annual Wellness Exam, performed 12 months or more after the Initial AWV or the last Subsequent AWV    I have reviewed the patient's medical history in detail and updated the computerized patient record. History     Past Medical History:   Diagnosis Date    Adverse effect of anesthesia     decreased BP with nitrous oxide    Chronic kidney disease     kidney stones    Chronic pain     neuropathy - burning feeling    Degenerative disc disease     Hypothyroidism     Ill-defined condition     genital warts    Ill-defined condition     neuropathy in torso    Ill-defined condition     osteopenia    Ill-defined condition     stigmatism right eye    Ill-defined condition     IBS    Ill-defined condition     phlebitis    Ill-defined condition     hx chickenpox/shingles    Ill-defined condition     hx double pneumonia    Ill-defined condition     \"stomach moved over after fall\" per pt per MD    Ill-defined condition     as child chronic tonsillitis/strep/gets infections easily per pt    Lumbar stenosis     Rheumatic fever     Thromboembolus (HCC)     R leg    Tremor     hand/fingers      Past Surgical History:   Procedure Laterality Date    APPENDECTOMY      BREAST SURGERY PROCEDURE UNLISTED      stereotactic bx bilateral - bening    COLONOSCOPY N/A 5/23/2016    COL[facility] by [facility] at [facility]OSCOPY    HX BREAST BIOPSY Right     Stereo Bx over 30yrs ago - BENIGN    HX BREAST BIOPSY Left     Surgical Bx Over 30yrs ago - BENIGN    HX CATARACT REMOVAL  2013    bilateral    HX DILATION AND CURETTAGE      x2    HX GYN  1980    laparotomy - ovarian cysts    HX HEENT      dental surgeries    HX ORTHOPAEDIC      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      Current Outpatient Prescriptions   Medication Sig Dispense Refill    diazePAM (VALIUM) 2 mg tablet       LYRICA 75 mg capsule TAKE ONE CAPSULE BY MOUTH THREE TIMES DAILY 90 Cap 2    levothyroxine (SYNTHROID) 50 mcg tablet TAKE ONE TABLET BY MOUTH ONCE DAILY BEFORE BREAKFAST 90 Tab 1    clindamycin (CLEOCIN T) 1 % external solution use thin film on affected area twice daily 60 mL 2    BLUE-GREEN ALGAE (SPIRULINA) Take 6 Tabs by mouth daily. Each tab is 550mg.  hydrOXYzine HCl (ATARAX) 25 mg tablet TAKE ONE TABLET BY MOUTH THREE TIMES DAILY AS NEEDED FOR  ITCHING  FOR  UP  TO  10  DAYS 30 Tab 0    calcitonin, salmon, (MIACALCIN) nasal USE ONE SPRAY IN NOSTRIL ONCE DAILY 6 Bottle 3    CROMOLYN SODIUM (NASALCROM NA) by Nasal route.  magic mouthwash solution Magic mouth wash   Maalox  Lidocaine 2% viscous   Diphenhydramine oral solution     Pharmacy to mix equal portions of ingredients to a total volume as indicated in the dispense amount. 150 mL 2    fexofenadine (ALLEGRA) 180 mg tablet Take  by mouth. Allergies   Allergen Reactions    Other Food Nausea and Vomiting     Artificial sweeteners allergy. Does not use corn syrup in diet. 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 Lightheadedness and dizziness, vertigo. Heart \"beating out of my chest\". \"Inhaled a breath but could not exhale\". Was on a fluoride treatment with dentist. Starlet Feliz to ER. \"[place] like I was jello\".  Nitrous Oxide Other (comments)     Blood pressure bottoms out    Other Medication Other (comments)     Avelox per pt is about the only antibiotic she can take. Not sure of the names of antibiotics she is allergic to. Caused intestinal obstruction.  Other Plant, Animal, Environmental Other (comments)     Mold spores, airborne fungal spores, trees, plants causes sinus infections and pneumonia.  Pcn [Penicillins] Other (comments)     State PCN does not work.  Wheat Other (comments)     Cannot digest wheat per pt.      Family History   Problem Relation Age of Onset    Migraines Mother     Cancer Mother      breast - mets to spinal cord and brain    Breast Cancer Mother      42's    Stroke Father      mini    Dementia Father     Other Father      prostate surgery/hallucination with pain meds (too much per pt)/tremors    Other Brother      brain aneurysm/blood clot in leg/polio    Cancer Brother      lung    Headache Maternal Grandmother     Kidney Disease Maternal Grandmother     Heart Disease Maternal Grandfather     Heart Disease Paternal Grandmother     Other Paternal Grandmother      overweight    Heart Attack Paternal Grandfather      Social History   Substance Use Topics    Smoking status: Current Every Day Smoker     Types: Cigarettes    Smokeless tobacco: Never Used      Comment: 4-5 cigs per day    Alcohol use No     Patient Active Problem List   Diagnosis Code    Hypothyroidism E03.9    Hyperlipidemia E78.5    Spinal stenosis, lumbar M48.061    Degenerative disc disease, lumbar M51.36    Irritable bowel syndrome without diarrhea K58.9    Personal history of DVT (deep vein thrombosis) Z86.718    History of shingles Z86.19    Age-related osteoporosis without current pathological fracture M81.0    History of rheumatic fever Z86.79    Easy bruising R23.8       Depression Risk Factor Screening:     PHQ over the last two weeks 6/28/2017   Little interest or pleasure in doing things Not at all   Feeling down, depressed or hopeless Not at all   Total Score PHQ 2 0     Alcohol Risk Factor Screening: You do not drink alcohol or very rarely. Functional Ability and Level of Safety:   Hearing Loss  The patient needs further evaluation. Activities of Daily Living  The home contains: no safety equipment. Patient does total self care    Fall Risk  Fall Risk Assessment, last 12 mths 6/28/2017   Able to walk? Yes   Fall in past 12 months?  No   Fall with injury? -   Number of falls in past 12 months -   Fall Risk Score -       Abuse Screen  Patient is not abused    Cognitive Screening   Evaluation of Cognitive Function:  Has your family/caregiver stated any concerns about your memory: no  Normal    Patient Care Team   Patient Care Team:  Jaswant Hugo MD as PCP - General (Internal Medicine)    Assessment/Plan   Education and counseling provided:  Are appropriate based on today's review and evaluation  End-of-Life planning (with patient's consent)    Diagnoses and all orders for this visit:    1. Medicare annual wellness visit, subsequent    2. Other specified hypothyroidism    3. Hyperlipidemia, unspecified hyperlipidemia type      There are no preventive care reminders to display for this patient.

## 2018-06-06 NOTE — PATIENT INSTRUCTIONS
Eat a well rounded diet. Stay physically active. Continue your current medications. Medicare Wellness Visit, Female    The best way to live healthy is to have a lifestyle where you eat a well-balanced diet, exercise regularly, limit alcohol use, and quit all forms of tobacco/nicotine, if applicable. Regular preventive services are another way to keep healthy. Preventive services (vaccines, screening tests, monitoring & exams) can help personalize your care plan, which helps you manage your own care. Screening tests can find health problems at the earliest stages, when they are easiest to treat. [facility] follows the current, evidence-based guidelines published by the [facility] [place] [facility]) when recommending preventive services for our patients. Because we follow these guidelines, sometimes recommendations change over time as research supports it. (For example, mammograms used to be recommended annually. Even though Medicare will still pay for an annual mammogram, the newer guidelines recommend a mammogram every two years for women of average risk.)    Of course, you and your provider may decide to screen more often for some diseases, based on your risk and co-morbidities (chronic disease you are already diagnosed with). Preventive services for you include:    - Medicare offers their members a free annual wellness visit, which is time for you and your primary care provider to discuss and plan for your preventive service needs. Take advantage of this benefit every year!    -All people over age 72 should receive the recommended pneumonia vaccines. Current USPSTF guidelines recommend a series of two vaccines for the best pneumonia protection.     -All adults should have a yearly flu vaccine and a tetanus vaccine every 10 years.  All adults age 61 years should receive a shingles vaccine once in their lifetime.      -A bone mass density test is recommended when a woman turns 72 to screen for osteoporosis. This test is only recommended once as a screening. Some providers will use this same test as a disease monitoring tool if you already have osteoporosis. -All adults age 38-68 years who are overweight should have a diabetes screening test once every three years.     -Other screening tests & preventive services for persons with diabetes include: an eye exam to screen for diabetic retinopathy, a kidney function test, a foot exam, and stricter control over your cholesterol.     -Cardiovascular screening for adults with routine risk involves an electrocardiogram (ECG) at intervals determined by the provider.     -Colorectal cancer screenings should be done for adults age 54-65 years with normal risk. There are a number of acceptable methods of screening for this type of cancer. Each test has its own benefits and drawbacks. Discuss with your provider what is most appropriate for you during your annual wellness visit. The different tests include: colonoscopy (considered the best screening method), a fecal occult blood test, a fecal DNA test, and sigmoidoscopy. -Breast cancer screenings are recommended every other year for women of normal risk age 54-69 years.     -Cervical cancer screenings for women over age 72 are only recommended with certain risk factors.     -All adults born between [facility] should be screened once for Hepatitis C. Here is a list of your current Health Maintenance items (your personalized list of preventive services) with a due date: There are no preventive care reminders to display for this patient.

## 2018-06-06 NOTE — PROGRESS NOTES
Chief Complaint   Patient presents with    Medication Evaluation     Reviewed record in preparation for visit and have obtained necessary documentation. Identified pt with two pt identifiers(name and ). Health Maintenance Due   Topic    GLAUCOMA SCREENING Q2Y     MEDICARE [address]          Chief Complaint   Patient presents with    Medication Evaluation        Wt Readings from Last 3 Encounters:   18 113 lb 1.6 oz (51.3 kg)   18 112 lb 4.8 oz (50.9 kg)   10/04/17 108 lb 14.4 oz (49.4 kg)     Temp Readings from Last 3 Encounters:   18 96.4 °F (35.8 °C) (Oral)   18 97 °F (36.1 °C) (Oral)   10/04/17 98.5 °F (36.9 °C) (Oral)     BP Readings from Last 3 Encounters:   18 110/60   18 130/70   10/04/17 100/60     Pulse Readings from Last 3 Encounters:   18 74   18 69   10/04/17 78           Learning Assessment:  :     Learning Assessment 2017   PRIMARY LEARNER Patient Patient   HIGHEST LEVEL OF EDUCATION - PRIMARY LEARNER  - 4 YEARS OF COLLEGE   BARRIERS PRIMARY LEARNER NONE NONE   CO-LEARNER CAREGIVER - No   PRIMARY LANGUAGE ENGLISH ENGLISH    NEED - No   LEARNER PREFERENCE PRIMARY READING READING   LEARNING SPECIAL TOPICS - no   ANSWERED BY Patient  patient   RELATIONSHIP SELF SELF   ASSESSMENT COMMENT - none       Depression Screening:  :     PHQ over the last two weeks 2017   Little interest or pleasure in doing things Not at all   Feeling down, depressed or hopeless Not at all   Total Score PHQ 2 0       Fall Risk Assessment:  :     Fall Risk Assessment, last 12 mths 2017   Able to walk? Yes   Fall in past 12 months? No   Fall with injury? -   Number of falls in past 12 months -   Fall Risk Score -       Abuse Screening:  :     Abuse Screening Questionnaire 2017   Do you ever feel afraid of your partner? N N   Are you in a relationship with someone who physically or mentally threatens you?  N N   Is it safe for you to go home? Y Y       Coordination of Care Questionnaire:  :     1) Have you been to an emergency room, urgent care clinic since your last visit? no   Hospitalized since your last visit? no             2) Have you seen or consulted any other health care providers outside of [address] [place] [address] since your last visit? yes  (Include any pap smears or colon screenings in this section.)    3) Do you have an Advance Directive on file? no    4) Are you interested in receiving information on Advance Directives? NO      Patient is accompanied by self I have received verbal consent from Calista Garcia to discuss any/all medical information while they are present in the room. Reviewed record  In preparation for visit and have obtained necessary documentation.

## 2018-07-06 DIAGNOSIS — R25.1 TREMOR: ICD-10-CM

## 2018-07-06 RX ORDER — CALCITONIN SALMON 200 [IU]/.09ML
SPRAY, METERED NASAL
Qty: 2 BOTTLE | Refills: 3 | Status: SHIPPED | OUTPATIENT
Start: 2018-07-06

## 2018-07-06 RX ORDER — PREGABALIN 75 MG/1
CAPSULE ORAL
Qty: 90 CAP | Refills: 2 | Status: SHIPPED | OUTPATIENT
Start: 2018-07-06 | End: 2018-10-06 | Stop reason: SDUPTHER

## 2018-07-16 RX ORDER — LIDOCAINE HYDROCHLORIDE 20 MG/ML
SOLUTION ORAL; TOPICAL
Qty: 150 ML | Refills: 2 | Status: SHIPPED | OUTPATIENT
Start: 2018-07-16 | End: 2018-10-10 | Stop reason: SDUPTHER

## 2018-08-02 DIAGNOSIS — L50.9 URTICARIA: ICD-10-CM

## 2018-08-02 RX ORDER — HYDROXYZINE 25 MG/1
TABLET, FILM COATED ORAL
Qty: 30 TAB | Refills: 0 | Status: SHIPPED | OUTPATIENT
Start: 2018-08-02 | End: 2018-09-08 | Stop reason: SDUPTHER

## 2018-08-10 RX ORDER — LEVOTHYROXINE SODIUM 50 UG/1
TABLET ORAL
Qty: 90 TAB | Refills: 1 | Status: SHIPPED | OUTPATIENT
Start: 2018-08-10

## 2018-09-08 DIAGNOSIS — L50.9 URTICARIA: ICD-10-CM

## 2018-09-10 RX ORDER — HYDROXYZINE 25 MG/1
TABLET, FILM COATED ORAL
Qty: 30 TAB | Refills: 0 | Status: SHIPPED | OUTPATIENT
Start: 2018-09-10 | End: 2018-12-28 | Stop reason: SDUPTHER

## 2018-10-04 ENCOUNTER — OFFICE VISIT (OUTPATIENT)
Dept: INTERNAL MEDICINE CLINIC | Age: 78
End: 2018-10-04

## 2018-10-04 VITALS
OXYGEN SATURATION: 96 % | RESPIRATION RATE: 16 BRPM | HEART RATE: 77 BPM | SYSTOLIC BLOOD PRESSURE: 100 MMHG | DIASTOLIC BLOOD PRESSURE: 60 MMHG | HEIGHT: 61 IN | BODY MASS INDEX: 22.03 KG/M2 | WEIGHT: 116.7 LBS | TEMPERATURE: 97.7 F

## 2018-10-04 DIAGNOSIS — E03.8 OTHER SPECIFIED HYPOTHYROIDISM: Primary | ICD-10-CM

## 2018-10-04 DIAGNOSIS — M51.36 DEGENERATIVE DISC DISEASE, LUMBAR: ICD-10-CM

## 2018-10-04 NOTE — PROGRESS NOTES
Chief Complaint   Patient presents with    Thyroid Problem     Reviewed record in preparation for visit and have obtained necessary documentation. Identified pt with two pt identifiers(name and ). Health Maintenance Due   Topic    Shingrix Vaccine Age 51> (2 of 2)         Chief Complaint   Patient presents with    Thyroid Problem        Wt Readings from Last 3 Encounters:   10/04/18 116 lb 11.2 oz (52.9 kg)   18 113 lb 1.6 oz (51.3 kg)   18 112 lb 4.8 oz (50.9 kg)     Temp Readings from Last 3 Encounters:   10/04/18 97.7 °F (36.5 °C) (Oral)   18 96.4 °F (35.8 °C) (Oral)   18 97 °F (36.1 °C) (Oral)     BP Readings from Last 3 Encounters:   10/04/18 100/60   18 110/60   18 130/70     Pulse Readings from Last 3 Encounters:   10/04/18 77   18 74   18 69           Learning Assessment:  :     Learning Assessment 2017   PRIMARY LEARNER Patient Patient   HIGHEST LEVEL OF EDUCATION - PRIMARY LEARNER  - 4 YEARS OF COLLEGE   BARRIERS PRIMARY LEARNER NONE NONE   CO-LEARNER CAREGIVER - No   PRIMARY LANGUAGE ENGLISH ENGLISH    NEED - No   LEARNER PREFERENCE PRIMARY READING READING   LEARNING SPECIAL TOPICS - no   ANSWERED BY Patient  patient   RELATIONSHIP SELF SELF   ASSESSMENT COMMENT - none       Depression Screening:  :     PHQ over the last two weeks 2017   Little interest or pleasure in doing things Not at all   Feeling down, depressed, irritable, or hopeless Not at all   Total Score PHQ 2 0       Fall Risk Assessment:  :     Fall Risk Assessment, last 12 mths 2017   Able to walk? Yes   Fall in past 12 months? No   Fall with injury? -   Number of falls in past 12 months -   Fall Risk Score -       Abuse Screening:  :     Abuse Screening Questionnaire 2017   Do you ever feel afraid of your partner? N N   Are you in a relationship with someone who physically or mentally threatens you?  N N   Is it safe for you to go home? Mandi Griffin       Coordination of Care Questionnaire:  :     1) Have you been to an emergency room, urgent care clinic since your last visit? no   Hospitalized since your last visit? no             2) Have you seen or consulted any other health care providers outside of [address] [place] [address] since your last visit? no  (Include any pap smears or colon screenings in this section.)    3) Do you have an Advance Directive on file? no    4) Are you interested in receiving information on Advance Directives? NO      Patient is accompanied by self I have received verbal consent from Roberto Garcia to discuss any/all medical information while they are present in the room. Reviewed record  In preparation for visit and have obtained necessary documentation.

## 2018-10-04 NOTE — PROGRESS NOTES
Subjective:     Chief Complaint   Patient presents with    Thyroid Problem     She  is a 78y.o. year old female who presents for evaluation. Here for follow up of medical problems. Historical Data    Past Medical History:   Diagnosis Date    Adverse effect of anesthesia     decreased BP with nitrous oxide    Chronic kidney disease     kidney stones    Chronic pain     neuropathy - burning feeling    Degenerative disc disease     Hypothyroidism     Ill-defined condition     genital warts    Ill-defined condition     neuropathy in torso    Ill-defined condition     osteopenia    Ill-defined condition     stigmatism right eye    Ill-defined condition     IBS    Ill-defined condition     phlebitis    Ill-defined condition     hx chickenpox/shingles    Ill-defined condition     hx double pneumonia    Ill-defined condition     \"stomach moved over after fall\" per pt per MD    Ill-defined condition     as child chronic tonsillitis/strep/gets infections easily per pt    Lumbar stenosis     Rheumatic fever     Thromboembolus (HCC)     R leg    Tremor     hand/fingers        Past Surgical History:   Procedure Laterality Date    APPENDECTOMY      BREAST SURGERY PROCEDURE UNLISTED      stereotactic bx bilateral - bening    COLONOSCOPY N/A 5/23/2016    COLONOSCOPY performed by Humble Krishnamurthy MD at [facility] ENDOSCOPY    HX BREAST BIOPSY Right     Stereo Bx over 30yrs ago - BENIGN    HX BREAST BIOPSY Left     Surgical Bx Over 30yrs ago - BENIGN    HX CATARACT REMOVAL  2013    bilateral    HX DILATION AND CURETTAGE      x2    HX GYN  1980    laparotomy - ovarian cysts    HX HEENT      dental surgeries    HX ORTHOPAEDIC      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         Outpatient Encounter Prescriptions as of 10/4/2018   Medication Sig Dispense Refill    hydrOXYzine HCl (ATARAX) 25 mg tablet TAKE 1 TABLET BY MOUTH THREE TIMES DAILY AS NEEDED FOR ITCHING FOR 10 DAYS 30 Tab 0    levothyroxine (SYNTHROID) 50 mcg tablet TAKE ONE TABLET BY MOUTH ONCE DAILY BEFORE BREAKFAST 90 Tab 1    calcitonin, salmon, (MIACALCIN) nasal USE ONE SPRAY IN NOSTRIL ONCE DAILY 2 Bottle 3    LYRICA 75 mg capsule TAKE 1 CAPSULE BY MOUTH THREE TIMES DAILY 90 Cap 2    CROMOLYN SODIUM (NASALCROM NA) by Nasal route.  clindamycin (CLEOCIN T) 1 % external solution use thin film on affected area twice daily 60 mL 2    fexofenadine (ALLEGRA) 180 mg tablet Take  by mouth.  BLUE-GREEN ALGAE (SPIRULINA) Take 6 Tabs by mouth daily. Each tab is 550mg.  LIDOCAINE VISCOUS 2 % solution SWISH AND SPIT 5 ML(S) BY MOUTH 4 TIMES DAILY AS NEEDED 150 mL 2    diazePAM (VALIUM) 2 mg tablet       magic mouthwash solution Magic mouth wash   Maalox  Lidocaine 2% viscous   Diphenhydramine oral solution     Pharmacy to mix equal portions of ingredients to a total volume as indicated in the dispense amount. 150 mL 2     No facility-administered encounter medications on file as of 10/4/2018. Allergies   Allergen Reactions    Other Food Nausea and Vomiting     Artificial sweeteners allergy. Does not use corn syrup in diet. 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 Lightheadedness and dizziness, vertigo. Heart \"beating out of my chest\". \"Inhaled a breath but could not exhale\". Was on a fluoride treatment with dentist. Adela Hunting to ER. \"[place] like I was jello\".  Nitrous Oxide Other (comments)     Blood pressure bottoms out    Other Medication Other (comments)     Avelox per pt is about the only antibiotic she can take. Not sure of the names of antibiotics she is allergic to. Caused intestinal obstruction.     Other Plant, Animal, Environmental Other (comments)     Mold spores, airborne fungal spores, trees, plants causes sinus infections and pneumonia.  Pcn [Penicillins] Other (comments)     State PCN does not work.  Wheat Other (comments)     Cannot digest wheat per pt. Social History     Social History    Marital status:      Spouse name: N/A    Number of children: N/A    Years of education: N/A     Occupational History    Not on file. Social History Main Topics    Smoking status: Current Every Day Smoker     Types: Cigarettes    Smokeless tobacco: Never Used      Comment: 4-5 cigs per day    Alcohol use No    Drug use: No    Sexual activity: No     Other Topics Concern    Not on file     Social History Narrative        Review of Systems  Pertinent items are noted in HPI. Objective:     Vitals:    10/04/18 1326   BP: 100/60   Pulse: 77   Resp: 16   Temp: 97.7 °F (36.5 °C)   TempSrc: Oral   SpO2: 96%   Weight: 116 lb 11.2 oz (52.9 kg)   Height: 5' 1\" (1.549 m)     Pleasant WF in no acute distress. Neck: Supple. No masses. Cardiac:  RRR without murmurs, gallops or rubs. Lungs: Clear to auscultation. Abdomen: Soft and non-tender. No masses. Extremities: No edema  Neuro: Intact    ASSESSMENT / PLAN:   1. Other specified hypothyroidism  · Continue thyroid replacement. 2. Degenerative disc disease, lumbar  · Stretches and stay as active as possible. Patient Instructions   Follow a Mediterranean diet. Stay as active as possible. Continue your current medications and supplements. Learning About the [address] [facility] Informed[employer] Diet  What is the Mediterranean diet? The Mediterranean diet is a style of eating rather than a diet plan. It features foods eaten in [place], [place], [place] and [place], and other countries along the [place]. It emphasizes eating foods like fish, fruits, vegetables, beans, high-fiber breads and whole grains, nuts, and olive oil. This style of eating includes limited red meat, cheese, and sweets.   Why choose the Mediterranean diet? A Mediterranean-style diet may improve heart health. It contains more fat than other heart-healthy diets. But the fats are mainly from nuts, unsaturated oils (such as fish oils and olive oil), and certain nut or seed oils (such as canola, soybean, or flaxseed oil). These fats may help protect the heart and blood vessels. How can you get started on the Mediterranean diet? Here are some things you can do to switch to a more Mediterranean way of eating. What to eat  · Eat a variety of fruits and vegetables each day, such as grapes, blueberries, tomatoes, broccoli, peppers, figs, olives, spinach, eggplant, beans, lentils, and chickpeas. · Eat a variety of whole-grain foods each day, such as oats, brown rice, and whole wheat bread, pasta, and couscous. · Eat fish at least 2 times a week. Try tuna, salmon, mackerel, lake trout, herring, or sardines. · Eat moderate amounts of low-fat dairy products, such as milk, cheese, or yogurt. · Eat moderate amounts of poultry and eggs. · Choose healthy (unsaturated) fats, such as nuts, olive oil, and certain nut or seed oils like canola, soybean, and flaxseed. · Limit unhealthy (saturated) fats, such as butter, palm oil, and coconut oil. And limit fats found in animal products, such as meat and dairy products made with whole milk. Try to eat red meat only a few times a month in very small amounts. · Limit sweets and desserts to only a few times a week. This includes sugar-sweetened drinks like soda. The Mediterranean diet may also include red wine with your meal--1 glass each day for women and up to 2 glasses a day for men. Tips for eating at home  · Use herbs, spices, garlic, lemon zest, and citrus juice instead of salt to add flavor to foods. · Add avocado slices to your sandwich instead of lentz. · Have fish for lunch or dinner instead of red meat. Brush the fish with olive oil, and broil or grill it.   · Sprinkle your salad with seeds or nuts instead of cheese. · Cook with olive or canola oil instead of butter or oils that are high in saturated fat. · Switch from 2% milk or whole milk to 1% or fat-free milk. · Dip raw vegetables in a vinaigrette dressing or hummus instead of dips made from mayonnaise or sour cream.  · Have a piece of fruit for dessert instead of a piece of cake. Try baked apples, or have some dried fruit. Tips for eating out  · Try broiled, grilled, baked, or poached fish instead of having it fried or breaded. · Ask your  to have your meals prepared with olive oil instead of butter. · Order dishes made with marinara sauce or sauces made from olive oil. Avoid sauces made from cream or mayonnaise. · Choose whole-grain breads, whole wheat pasta and pizza crust, brown rice, beans, and lentils. · Cut back on butter or margarine on bread. Instead, you can dip your bread in a small amount of olive oil. · Ask for a side salad or grilled vegetables instead of french fries or chips. Where can you learn more? Go to http://germainNuHabitatmichelle.info/. Enter 741-744-9575 in the search box to learn more about \"Learning About the Mediterranean Diet. \"  Current as of: March 29, 2018  Content Version: 11.8  © 0586-0013 Healthwise, [employer]. Care instructions adapted under license by [employer] (which disclaims liability or warranty for this information). If you have questions about a medical condition or this instruction, always ask your healthcare professional. [place] [address] any warranty or liability for your use of this information. Follow-up Disposition:  Return in about 6 months (around 4/4/2019) for Regular follow up. Advised her to call back or return to office if symptoms worsen/change/persist.  Discussed expected course/resolution/complications of diagnosis in detail with patient. Medication risks/benefits/costs/interactions/alternatives discussed with patient.   She was given an after visit summary which includes diagnoses, current medications, & vitals. She expressed understanding with the diagnosis and plan.

## 2018-10-04 NOTE — PATIENT INSTRUCTIONS
Follow a Mediterranean diet. Stay as active as possible. Continue your current medications and supplements. Learning About the [address] Diet  What is the Mediterranean diet? The Mediterranean diet is a style of eating rather than a diet plan. It features foods eaten in [place], [place], [place] and [place], and other countries along the [facility]. It emphasizes eating foods like fish, fruits, vegetables, beans, high-fiber breads and whole grains, nuts, and olive oil. This style of eating includes limited red meat, cheese, and sweets. Why choose the Mediterranean diet? A Mediterranean-style diet may improve heart health. It contains more fat than other heart-healthy diets. But the fats are mainly from nuts, unsaturated oils (such as fish oils and olive oil), and certain nut or seed oils (such as canola, soybean, or flaxseed oil). These fats may help protect the heart and blood vessels. How can you get started on the Mediterranean diet? Here are some things you can do to switch to a more Mediterranean way of eating. What to eat  · Eat a variety of fruits and vegetables each day, such as grapes, blueberries, tomatoes, broccoli, peppers, figs, olives, spinach, eggplant, beans, lentils, and chickpeas. · Eat a variety of whole-grain foods each day, such as oats, brown rice, and whole wheat bread, pasta, and couscous. · Eat fish at least 2 times a week. Try tuna, salmon, mackerel, lake trout, herring, or sardines. · Eat moderate amounts of low-fat dairy products, such as milk, cheese, or yogurt. · Eat moderate amounts of poultry and eggs. · Choose healthy (unsaturated) fats, such as nuts, olive oil, and certain nut or seed oils like canola, soybean, and flaxseed. · Limit unhealthy (saturated) fats, such as butter, palm oil, and coconut oil. And limit fats found in animal products, such as meat and dairy products made with whole milk.  Try to eat red meat only a few times a month in very small amounts. · Limit sweets and desserts to only a few times a week. This includes sugar-sweetened drinks like soda. The Mediterranean diet may also include red wine with your meal--1 glass each day for women and up to 2 glasses a day for men. Tips for eating at home  · Use herbs, spices, garlic, lemon zest, and citrus juice instead of salt to add flavor to foods. · Add avocado slices to your sandwich instead of lentz. · Have fish for lunch or dinner instead of red meat. Brush the fish with olive oil, and broil or grill it. · Sprinkle your salad with seeds or nuts instead of cheese. · Cook with olive or canola oil instead of butter or oils that are high in saturated fat. · Switch from 2% milk or whole milk to 1% or fat-free milk. · Dip raw vegetables in a vinaigrette dressing or hummus instead of dips made from mayonnaise or sour cream.  · Have a piece of fruit for dessert instead of a piece of cake. Try baked apples, or have some dried fruit. Tips for eating out  · Try broiled, grilled, baked, or poached fish instead of having it fried or breaded. · Ask your  to have your meals prepared with olive oil instead of butter. · Order dishes made with marinara sauce or sauces made from olive oil. Avoid sauces made from cream or mayonnaise. · Choose whole-grain breads, whole wheat pasta and pizza crust, brown rice, beans, and lentils. · Cut back on butter or margarine on bread. Instead, you can dip your bread in a small amount of olive oil. · Ask for a side salad or grilled vegetables instead of french fries or chips. Where can you learn more? Go to http://germain-michelle.info/. Enter 898-448-7899 in the search box to learn more about \"Learning About the Mediterranean Diet. \"  Current as of: March 29, 2018  Content Version: 11.8  © 5568-0866 Healthwise, [employer].  Care instructions adapted under license by [employer] (which disclaims liability or warranty for this information). If you have questions about a medical condition or this instruction, always ask your healthcare professional. [place] [address] any warranty or liability for your use of this information.

## 2018-10-04 NOTE — MR AVS SNAPSHOT
[address] [facility] [address], [address] [facility] 
[phone] Patient: Audrey Santana MRN: [id number] TKA:[id number] Visit Information Date & Time Provider Department Dept. Phone Encounter #  
 10/4/2018  1:30 PM Michaelle Goldstein MD [place] [id number] [facility] [phone] [id number] Follow-up Instructions Return in about 6 months (around 4/4/2019) for Regular follow up. Upcoming Health Maintenance Date Due Influenza Age 5 to Adult 3/31/2019* MEDICARE YEARLY EXAM 6/7/2019 GLAUCOMA SCREENING Q2Y 4/2/2020 DTaP/Tdap/Td series (2 - Td) 4/19/2026 COLONOSCOPY 5/23/2026 *Topic was postponed. The date shown is not the original due date. Allergies as of 10/4/2018  Review Complete On: 10/4/2018 By: Michaelle Goldstein MD  
  
 Severity Noted Reaction Type Reactions Other Food  05/20/2016    Nausea and Vomiting Artificial sweeteners allergy. Does not use corn syrup in diet. 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 Lightheadedness and dizziness, vertigo. Heart \"beating out of my chest\". \"Inhaled a breath but could not exhale\". Was on a fluoride treatment with dentist. Germain Kay to ER. \"Granite like I was jello\". Nitrous Oxide  12/08/2014   Side Effect Other (comments) Blood pressure bottoms out Other Medication  05/20/2016    Other (comments) Avelox per pt is about the only antibiotic she can take. Not sure of the names of antibiotics she is allergic to. Caused intestinal obstruction. Other Plant, Animal, Environmental  05/20/2016    Other (comments) Mold spores, airborne fungal spores, trees, plants causes sinus infections and pneumonia. Pcn [Penicillins]  05/20/2016    Other (comments) State PCN does not work. Wheat  05/20/2016    Other (comments) Cannot digest wheat per pt. Current Immunizations  Reviewed on 10/4/2017 Name Date Influenza High Dose Vaccine PF 10/4/2017 Pneumococcal Conjugate (PCV-13) 4/19/2016 Pneumococcal Polysaccharide (PPSV-23) 8/1/2012 Tdap 4/19/2016 Zoster Recombinant 7/5/2018, 4/5/2018 Zoster Vaccine, Live 9/1/2015 Not reviewed this visit You Were Diagnosed With   
  
 Codes Comments Other specified hypothyroidism    -  Primary ICD-10-CM: E03.8 ICD-9-CM: 244.8 Degenerative disc disease, lumbar     ICD-10-CM: M51.36 
ICD-9-CM: 722.52 Vitals BP Pulse Temp Resp Height(growth percentile) Weight(growth percentile) 100/60 (BP 1 Location: Left arm, BP Patient Position: Sitting) 77 97.7 °F (36.5 °C) (Oral) 16 5' 1\" (1.549 m) 116 lb 11.2 oz (52.9 kg) SpO2 BMI OB Status Smoking Status 96% 22.05 kg/m2 Postmenopausal Current Every Day Smoker Vitals History BMI and BSA Data Body Mass Index Body Surface Area 22.05 kg/m 2 1.51 m 2 Preferred Pharmacy Pharmacy Name Phone [facility] [address] [place] [address] [phone] Your Updated Medication List  
  
   
This list is accurate as of 10/4/18  1:42 PM.  Always use your most recent med list.  
  
  
  
  
 calcitonin (salmon) nasal  
Commonly known as:  MIACALCIN  
USE ONE SPRAY IN NOSTRIL ONCE DAILY  
  
 clindamycin 1 % external solution Commonly known as:  CLEOCIN T  
use thin film on affected area twice daily  
  
 diazePAM 2 mg tablet Commonly known as:  VALIUM  
  
 fexofenadine 180 mg tablet Commonly known as:  Madalynn Furry Take  by mouth. hydrOXYzine HCl 25 mg tablet Commonly known as:  ATARAX TAKE 1 TABLET BY MOUTH THREE TIMES DAILY AS NEEDED FOR ITCHING FOR 10 DAYS  
  
 levothyroxine 50 mcg tablet Commonly known as:  SYNTHROID  
TAKE ONE TABLET BY MOUTH ONCE DAILY BEFORE BREAKFAST  
  
 LIDOCAINE VISCOUS 2 % solution Generic drug:  lidocaine SWISH AND SPIT 5 ML(S) BY MOUTH 4 TIMES DAILY AS NEEDED  
  
 LYRICA 75 mg capsule Generic drug:  pregabalin TAKE 1 CAPSULE BY MOUTH THREE TIMES DAILY  
  
 magic mouthwash solution Magic mouth wash  Maalox Lidocaine 2% viscous  Diphenhydramine oral solution   Pharmacy to mix equal portions of ingredients to a total volume as indicated in the dispense amount. NASALCROM NA  
by Nasal route. SPIRULINA Take 6 Tabs by mouth daily. Each tab is 550mg. Follow-up Instructions Return in about 6 months (around 4/4/2019) for Regular follow up. Patient Instructions Follow a Mediterranean diet. Stay as active as possible. Continue your current medications and supplements. Learning About the [address] [facility] Diet What is the Mediterranean diet? The Mediterranean diet is a style of eating rather than a diet plan. It features foods eaten in [place], [place], [place] and [place], and other countries along the [facility]. It emphasizes eating foods like fish, fruits, vegetables, beans, high-fiber breads and whole grains, nuts, and olive oil. This style of eating includes limited red meat, cheese, and sweets. Why choose the Mediterranean diet? A Mediterranean-style diet may improve heart health. It contains more fat than other heart-healthy diets. But the fats are mainly from nuts, unsaturated oils (such as fish oils and olive oil), and certain nut or seed oils (such as canola, soybean, or flaxseed oil). These fats may help protect the heart and blood vessels. How can you get started on the Mediterranean diet? Here are some things you can do to switch to a more Mediterranean way of eating. What to eat · Eat a variety of fruits and vegetables each day, such as grapes, blueberries, tomatoes, broccoli, peppers, figs, olives, spinach, eggplant, beans, lentils, and chickpeas.  
· Eat a variety of whole-grain foods each day, such as oats, brown rice, and whole wheat bread, pasta, and couscous. · Eat fish at least 2 times a week. Try tuna, salmon, mackerel, lake trout, herring, or sardines. · Eat moderate amounts of low-fat dairy products, such as milk, cheese, or yogurt. · Eat moderate amounts of poultry and eggs. · Choose healthy (unsaturated) fats, such as nuts, olive oil, and certain nut or seed oils like canola, soybean, and flaxseed. · Limit unhealthy (saturated) fats, such as butter, palm oil, and coconut oil. And limit fats found in animal products, such as meat and dairy products made with whole milk. Try to eat red meat only a few times a month in very small amounts. · Limit sweets and desserts to only a few times a week. This includes sugar-sweetened drinks like soda. The Mediterranean diet may also include red wine with your meal1 glass each day for women and up to 2 glasses a day for men. Tips for eating at home · Use herbs, spices, garlic, lemon zest, and citrus juice instead of salt to add flavor to foods. · Add avocado slices to your sandwich instead of lentz. · Have fish for lunch or dinner instead of red meat. Brush the fish with olive oil, and broil or grill it. · Sprinkle your salad with seeds or nuts instead of cheese. · Cook with olive or canola oil instead of butter or oils that are high in saturated fat. · Switch from 2% milk or whole milk to 1% or fat-free milk. · Dip raw vegetables in a vinaigrette dressing or hummus instead of dips made from mayonnaise or sour cream. 
· Have a piece of fruit for dessert instead of a piece of cake. Try baked apples, or have some dried fruit. Tips for eating out · Try broiled, grilled, baked, or poached fish instead of having it fried or breaded. · Ask your  to have your meals prepared with olive oil instead of butter. · Order dishes made with marinara sauce or sauces made from olive oil. Avoid sauces made from cream or mayonnaise. · Choose whole-grain breads, whole wheat pasta and pizza crust, brown rice, beans, and lentils. · Cut back on butter or margarine on bread. Instead, you can dip your bread in a small amount of olive oil. · Ask for a side salad or grilled vegetables instead of french fries or chips. Where can you learn more? Go to http://germain-michelle.info/. Enter [phone] in the search box to learn more about \"Learning About the Mediterranean Diet. \" Current as of: March 29, 2018 Content Version: 11.8 © 1513-0233 [employer]. Care instructions adapted under license by [employer] (which disclaims liability or warranty for this information). If you have questions about a medical condition or this instruction, always ask your healthcare professional. [address] any warranty or liability for your use of this information. Introducing [facility]! Marcin Stapleton introduces RealPage patient portal. Now you can access parts of your medical record, email your doctor's office, and request medication refills online. 1. In your internet browser, go to [email or web address] 2. Click on the First Time User? Click Here link in the Sign In box. You will see the New Member Sign Up page. 3. Enter your RealPage Access Code exactly as it appears below. You will not need to use this code after youve completed the sign-up process. If you do not sign up before the expiration date, you must request a new code. · RealPage Access Code: [email or web address] Expires: 1/2/2019  1:42 PM 
 
4. Enter the last four digits of your Social Security Number (xxxx) and Date of Birth (mm/dd/yyyy) as indicated and click Submit. You will be taken to the next sign-up page. 5. Create a RealPage ID. This will be your RealPage login ID and cannot be changed, so think of one that is secure and easy to remember. 6. Create a Avogy password. You can change your password at any time. 7. Enter your Password Reset Question and Answer. This can be used at a later time if you forget your password. 8. Enter your e-mail address. You will receive e-mail notification when new information is available in [address] 9. Click Sign Up. You can now view and download portions of your medical record. 10. Click the Download Summary menu link to download a portable copy of your medical information. If you have questions, please visit the Frequently Asked Questions section of the Avogy website. Remember, Avogy is NOT to be used for urgent needs. For medical emergencies, dial 911. Now available from your iPhone and Android! Please provide this summary of care documentation to your next provider. Your primary care clinician is listed as Veto Florentino. If you have any questions after today's visit, please call [phone].

## 2018-10-06 DIAGNOSIS — R25.1 TREMOR: ICD-10-CM

## 2018-10-08 RX ORDER — PREGABALIN 75 MG/1
CAPSULE ORAL
Qty: 90 CAP | Refills: 2 | Status: SHIPPED | OUTPATIENT
Start: 2018-10-08 | End: 2018-12-28 | Stop reason: SDUPTHER

## 2018-10-08 NOTE — TELEPHONE ENCOUNTER
Patient prescription for Lyrica  75 mg faxed to [facility] [phone]. Confirmation received.

## 2018-10-10 RX ORDER — LIDOCAINE HYDROCHLORIDE 20 MG/ML
SOLUTION ORAL; TOPICAL
Qty: 150 ML | Refills: 2 | Status: SHIPPED | OUTPATIENT
Start: 2018-10-10

## 2018-12-28 DIAGNOSIS — L50.9 URTICARIA: ICD-10-CM

## 2018-12-28 DIAGNOSIS — R25.1 TREMOR: ICD-10-CM

## 2018-12-28 NOTE — TELEPHONE ENCOUNTER
PCP: Violeta Jiang MD    Last appt: 10/4/2018  No future appointments.     Requested Prescriptions     Pending Prescriptions Disp Refills    hydrOXYzine HCl (ATARAX) 25 mg tablet 30 Tab 0

## 2018-12-31 RX ORDER — HYDROXYZINE 25 MG/1
TABLET, FILM COATED ORAL
Qty: 30 TAB | Refills: 0 | Status: SHIPPED | OUTPATIENT
Start: 2018-12-31

## 2018-12-31 RX ORDER — PREGABALIN 75 MG/1
CAPSULE ORAL
Qty: 90 CAP | Refills: 2 | Status: SHIPPED | OUTPATIENT
Start: 2018-12-31

## 2019-02-15 ENCOUNTER — HOSPITAL ENCOUNTER (OUTPATIENT)
Dept: MAMMOGRAPHY | Age: 79
Discharge: HOME | End: 2019-02-15
Payer: MEDICARE

## 2019-02-15 DIAGNOSIS — M81.0 OSTEOPOROSIS: ICD-10-CM

## 2019-02-15 PROCEDURE — 77080 DXA BONE DENSITY AXIAL: CPT

## 2019-08-12 ENCOUNTER — HOSPITAL ENCOUNTER (OUTPATIENT)
Dept: ULTRASOUND IMAGING | Age: 79
Discharge: HOME | End: 2019-08-12
Payer: MEDICARE

## 2019-08-12 DIAGNOSIS — R60.0 LOWER EXTREMITY EDEMA: ICD-10-CM

## 2019-08-12 PROCEDURE — 93970 EXTREMITY STUDY: CPT

## 2020-10-30 ENCOUNTER — TRANSCRIBE ORDER (OUTPATIENT)
Dept: SCHEDULING | Age: 80
End: 2020-10-30

## 2020-10-30 DIAGNOSIS — M79.606 PAIN IN LOWER LIMB: Primary | ICD-10-CM

## 2021-01-22 ENCOUNTER — TRANSCRIBE ORDER (OUTPATIENT)
Dept: SCHEDULING | Age: 81
End: 2021-01-22

## 2021-01-22 DIAGNOSIS — R53.1 WEAKNESS: Primary | ICD-10-CM

## 2021-09-30 ENCOUNTER — TRANSCRIBE ORDER (OUTPATIENT)
Dept: SCHEDULING | Age: 81
End: 2021-09-30

## 2021-09-30 DIAGNOSIS — N63.11 UNSPECIFIED LUMP IN THE RIGHT BREAST, UPPER OUTER QUADRANT: Primary | ICD-10-CM

## 2021-10-19 ENCOUNTER — TRANSCRIBE ORDER (OUTPATIENT)
Dept: SCHEDULING | Age: 81
End: 2021-10-19

## 2021-10-19 DIAGNOSIS — M85.80 OSTEOPENIA: Primary | ICD-10-CM

## 2022-03-19 PROBLEM — R23.3 EASY BRUISING: Status: ACTIVE | Noted: 2017-08-31

## 2022-03-19 PROBLEM — Z86.79 HISTORY OF RHEUMATIC FEVER: Status: ACTIVE | Noted: 2017-08-31

## 2022-03-20 PROBLEM — M81.0 AGE-RELATED OSTEOPOROSIS WITHOUT CURRENT PATHOLOGICAL FRACTURE: Status: ACTIVE | Noted: 2017-08-31

## 2022-05-16 ENCOUNTER — OFFICE VISIT (OUTPATIENT)
Dept: ORTHOPEDIC SURGERY | Age: 82
End: 2022-05-16
Payer: MEDICARE

## 2022-05-16 VITALS — BODY MASS INDEX: 21.71 KG/M2 | HEIGHT: 61 IN | WEIGHT: 115 LBS

## 2022-05-16 DIAGNOSIS — G89.29 CHRONIC PAIN OF RIGHT KNEE: Primary | ICD-10-CM

## 2022-05-16 DIAGNOSIS — M17.11 ARTHRITIS OF RIGHT KNEE: ICD-10-CM

## 2022-05-16 DIAGNOSIS — M25.561 CHRONIC PAIN OF RIGHT KNEE: Primary | ICD-10-CM

## 2022-05-16 PROCEDURE — G8432 DEP SCR NOT DOC, RNG: HCPCS | Performed by: PHYSICIAN ASSISTANT

## 2022-05-16 PROCEDURE — G8427 DOCREV CUR MEDS BY ELIG CLIN: HCPCS | Performed by: PHYSICIAN ASSISTANT

## 2022-05-16 PROCEDURE — 1090F PRES/ABSN URINE INCON ASSESS: CPT | Performed by: PHYSICIAN ASSISTANT

## 2022-05-16 PROCEDURE — G8420 CALC BMI NORM PARAMETERS: HCPCS | Performed by: PHYSICIAN ASSISTANT

## 2022-05-16 PROCEDURE — 1101F PT FALLS ASSESS-DOCD LE1/YR: CPT | Performed by: PHYSICIAN ASSISTANT

## 2022-05-16 PROCEDURE — 20610 DRAIN/INJ JOINT/BURSA W/O US: CPT | Performed by: PHYSICIAN ASSISTANT

## 2022-05-16 PROCEDURE — G8536 NO DOC ELDER MAL SCRN: HCPCS | Performed by: PHYSICIAN ASSISTANT

## 2022-05-16 PROCEDURE — 99203 OFFICE O/P NEW LOW 30 MIN: CPT | Performed by: PHYSICIAN ASSISTANT

## 2022-05-16 RX ORDER — ALPRAZOLAM 0.25 MG/1
TABLET ORAL
COMMUNITY
Start: 2022-04-21

## 2022-05-16 RX ORDER — ATORVASTATIN CALCIUM 10 MG/1
10 TABLET, FILM COATED ORAL DAILY
COMMUNITY
Start: 2022-04-27

## 2022-05-19 RX ORDER — TRIAMCINOLONE ACETONIDE 40 MG/ML
40 INJECTION, SUSPENSION INTRA-ARTICULAR; INTRAMUSCULAR ONCE
Status: COMPLETED | OUTPATIENT
Start: 2022-05-19 | End: 2022-05-19

## 2022-05-19 RX ADMIN — TRIAMCINOLONE ACETONIDE 40 MG: 40 INJECTION, SUSPENSION INTRA-ARTICULAR; INTRAMUSCULAR at 12:44

## 2022-05-19 NOTE — PROGRESS NOTES
Velma Garcia (: 1940) is a 80 y.o. female patient, here for evaluation of the following chief complaint(s):  Knee Pain (right knee pain)       ASSESSMENT/PLAN:  Below is the assessment and plan developed based on review of pertinent history, physical exam, labs, studies, and medications. 79-year-old female comes in today for evaluation of right knee pain. Has been going on for the past 2 years plus. Continues to slowly worsen. Pain is throughout. X-rays show severe lateral osteoarthritic changes bone-on-bone. Mild changes noted on medial patellofemoral compartment as well as. Symptoms secondary to osteoarthritis. Discussed findings with patient. Patient does not feel that she is at a point to undergo surgery with her finances and her . She would like to try another steroid injection. After verbal consent was obtained I injected 40 mg triamcinolone into the right knee joint using sterile technique. Patient tolerated well. Encouraged activity modification and physical therapy as able. Follow-up as needed      1. Chronic pain of right knee  -     XR KNEE RT MIN 4 V; Future  2. Arthritis of right knee  -     DRAIN/INJECT LARGE JOINT/BURSA  -     triamcinolone acetonide (KENALOG-40) 40 mg/mL injection 40 mg; 40 mg, Intra artICUlar, ONCE, 1 dose, On Thu 22 at 1300      Encounter Diagnoses   Name Primary?  Chronic pain of right knee Yes    Arthritis of right knee         No follow-ups on file. SUBJECTIVE/OBJECTIVE:  Velma Garcia (: 1940) is a 80 y.o. female who presents today for the following:  Chief Complaint   Patient presents with    Knee Pain     right knee pain       79-year-old female comes in today for evaluation of right knee pain. Has been bothering her for the last 2 years plus. Walks with a cane at home. Has to use a wheelchair for longer duration of ambulation secondary to pain. Today she is in a wheelchair secondary to duration from car to office.   Rates the pain as moderate to severe. She has tried physical therapy, medications, and a steroid injection in the past.  Steroid injection worked temporarily but symptoms have since returned. IMAGING:  XR Results (most recent):  Results from Appointment encounter on 05/16/22    XR KNEE RT MIN 4 V    Narrative  3 views right knee x-rays ordered and personally reviewed. Patient with significant lateral joint space narrowing. Patellofemoral and medial joint spaces appear relatively well-preserved. Allergies   Allergen Reactions    Other Food Nausea and Vomiting     Artificial sweeteners allergy. Does not use corn syrup in diet. 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 Lightheadedness and dizziness, vertigo. Heart \"beating out of my chest\". \"Inhaled a breath but could not exhale\". Was on a fluoride treatment with dentist. Dayne Tarango to ER. \"Felt like I was jello\".  Nitrous Oxide Other (comments)     Blood pressure bottoms out    Other Medication Other (comments)     Avelox per pt is about the only antibiotic she can take. Not sure of the names of antibiotics she is allergic to. Caused intestinal obstruction.  Other Plant, Animal, Environmental Other (comments)     Mold spores, airborne fungal spores, trees, plants causes sinus infections and pneumonia.  Pcn [Penicillins] Other (comments)     State PCN does not work.  Wheat Other (comments)     Cannot digest wheat per pt. Current Outpatient Medications   Medication Sig    ALPRAZolam (XANAX) 0.25 mg tablet     atorvastatin (LIPITOR) 10 mg tablet Take 10 mg by mouth daily.     LYRICA 75 mg capsule TAKE 1 CAPSULE BY MOUTH THREE TIMES DAILY    hydrOXYzine HCl (ATARAX) 25 mg tablet TAKE 1 TABLET BY MOUTH THREE TIMES DAILY AS NEEDED FOR ITCHING FOR 10 DAYS    LIDOCAINE VISCOUS 2 % solution SWISH AND SPIT 5 ML(S) BY MOUTH 4 TIMES DAILY AS NEEDED    levothyroxine (SYNTHROID) 50 mcg tablet TAKE ONE TABLET BY MOUTH ONCE DAILY BEFORE BREAKFAST    calcitonin, salmon, (MIACALCIN) nasal USE ONE SPRAY IN NOSTRIL ONCE DAILY    clindamycin (CLEOCIN T) 1 % external solution use thin film on affected area twice daily    magic mouthwash solution Magic mouth wash   Maalox  Lidocaine 2% viscous   Diphenhydramine oral solution     Pharmacy to mix equal portions of ingredients to a total volume as indicated in the dispense amount.  diazePAM (VALIUM) 2 mg tablet  (Patient not taking: Reported on 5/16/2022)    CROMOLYN SODIUM (NASALCROM NA) by Nasal route. (Patient not taking: Reported on 5/16/2022)    fexofenadine (ALLEGRA) 180 mg tablet Take  by mouth. (Patient not taking: Reported on 5/16/2022)    BLUE-GREEN ALGAE (SPIRULINA) Take 6 Tabs by mouth daily. Each tab is 550mg.   (Patient not taking: Reported on 5/16/2022)     Current Facility-Administered Medications   Medication    triamcinolone acetonide (KENALOG-40) 40 mg/mL injection 40 mg       Past Medical History:   Diagnosis Date    Adverse effect of anesthesia     decreased BP with nitrous oxide    Chronic kidney disease     kidney stones    Chronic pain     neuropathy - burning feeling    Degenerative disc disease     Hypothyroidism     Ill-defined condition     genital warts    Ill-defined condition     neuropathy in torso    Ill-defined condition     osteopenia    Ill-defined condition     stigmatism right eye    Ill-defined condition     IBS    Ill-defined condition     phlebitis    Ill-defined condition     hx chickenpox/shingles    Ill-defined condition     hx double pneumonia    Ill-defined condition     \"stomach moved over after fall\" per pt per MD    Ill-defined condition     as child chronic tonsillitis/strep/gets infections easily per pt    Lumbar stenosis     Rheumatic fever     Thromboembolus ([address]     R leg    Tremor     hand/fingers        Past Surgical History:   Procedure Laterality Date    APPENDECTOMY      BREAST SURGERY PROCEDURE UNLISTED      stereotactic bx bilateral - bening    COLONOSCOPY N/A 5/23/2016    COLONOSCOPY performed by Mireya Massey MD at [address] HX BREAST BIOPSY Right     Stereo Bx over 30yrs ago - BENIGN    HX BREAST BIOPSY Left     Surgical Bx Over 30yrs ago - BENIGN    HX CATARACT REMOVAL  2013    bilateral    HX DILATION AND CURETTAGE      x2    HX GYN  1980    laparotomy - ovarian cysts    HX HEENT      dental surgeries    HX ORTHOPAEDIC      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 Family History   Problem Relation Age of Onset   Marilin Orts Migraines Mother     Cancer Mother         breast - mets to spinal cord and brain   Marilin Orts Breast Cancer Mother         42's    Stroke Father         mini    Dementia Father     Other Father         prostate surgery/hallucination with pain meds (too much per pt)/tremors    Other Brother         brain aneurysm/blood clot in leg/polio    Cancer Brother         lung    Headache Maternal Grandmother     Kidney Disease Maternal Grandmother     Heart Disease Maternal Grandfather     Heart Disease Paternal Grandmother     Other Paternal Grandmother         overweight    Heart Attack Paternal Grandfather         Social History     Tobacco Use    Smoking status: Current Every Day Smoker     Types: Cigarettes    Smokeless tobacco: Never Used    Tobacco comment: 4-5 cigs per day   Substance Use Topics    Alcohol use: No        All systems reviewed x 12 and were negative with the exception of None      No flowsheet data found. Vitals:  Ht 5' 1\" (1.549 m)   Wt 115 lb (52.2 kg)   BMI 21.73 kg/m²    Body mass index is 21.73 kg/m². Physical Exam    General: NAD, well developed, well nourished, alert and oriented x 3.     Cardiac: Extremities well perfused    Respiratory: Nonlabored breathing    LLE: No effusion noted. No previous incisions noted. ROM 0-120 degrees. Grossly stable to varus/valgus stress and anterior/posterior drawer tests. Negative McMurrays. Motor grossly intact. RLE: In a wheelchair today secondary to duration of car to office. Mild effusion noted. No previous incisions noted. ROM 0-120 degrees. Grossly stable to varus/valgus stress and anterior/posterior drawer tests. Medial and lateral joint line tenderness. Crepitus with range of motion. Motor grossly intact. Vascular: Palpable pedal pulses, equal bilaterally. Skin: Warm well perfused, cap refill < 2 sec. Gisele Conway M.D. was available for immediate consultation as the supervising physician. An electronic signature was used to authenticate this note.   -- Daivd Members, VIVIANE

## 2023-09-15 ENCOUNTER — APPOINTMENT (OUTPATIENT)
Facility: HOSPITAL | Age: 83
End: 2023-09-15
Payer: MEDICARE

## 2023-09-15 ENCOUNTER — HOSPITAL ENCOUNTER (OUTPATIENT)
Facility: HOSPITAL | Age: 83
Setting detail: OBSERVATION
LOS: 1 days | Discharge: NURSING, REHAB OR LONG-TERM CARE | End: 2023-09-21
Attending: EMERGENCY MEDICINE | Admitting: STUDENT IN AN ORGANIZED HEALTH CARE EDUCATION/TRAINING PROGRAM
Payer: MEDICARE

## 2023-09-15 DIAGNOSIS — R26.2 UNABLE TO AMBULATE: Primary | ICD-10-CM

## 2023-09-15 PROBLEM — R53.1 WEAKNESS GENERALIZED: Status: ACTIVE | Noted: 2023-09-15

## 2023-09-15 LAB
ALBUMIN SERPL-MCNC: 3.6 G/DL (ref 3.5–5)
ALBUMIN/GLOB SERPL: 1 (ref 1.1–2.2)
ALP SERPL-CCNC: 110 U/L (ref 45–117)
ALT SERPL-CCNC: 22 U/L (ref 12–78)
ANION GAP SERPL CALC-SCNC: 3 MMOL/L (ref 5–15)
APPEARANCE UR: CLEAR
AST SERPL-CCNC: 27 U/L (ref 15–37)
BACTERIA URNS QL MICRO: NEGATIVE /HPF
BASOPHILS # BLD: 0 K/UL (ref 0–0.1)
BASOPHILS NFR BLD: 0 % (ref 0–1)
BILIRUB SERPL-MCNC: 0.4 MG/DL (ref 0.2–1)
BILIRUB UR QL: NEGATIVE
BUN SERPL-MCNC: 11 MG/DL (ref 6–20)
BUN/CREAT SERPL: 9 (ref 12–20)
CALCIUM SERPL-MCNC: 9.3 MG/DL (ref 8.5–10.1)
CHLORIDE SERPL-SCNC: 109 MMOL/L (ref 97–108)
CK SERPL-CCNC: 112 U/L (ref 26–192)
CO2 SERPL-SCNC: 29 MMOL/L (ref 21–32)
COLOR UR: NORMAL
CREAT SERPL-MCNC: 1.2 MG/DL (ref 0.55–1.02)
DIFFERENTIAL METHOD BLD: ABNORMAL
EOSINOPHIL # BLD: 0 K/UL (ref 0–0.4)
EOSINOPHIL NFR BLD: 0 % (ref 0–7)
EPITH CASTS URNS QL MICRO: NORMAL /LPF
ERYTHROCYTE [DISTWIDTH] IN BLOOD BY AUTOMATED COUNT: 14.4 % (ref 11.5–14.5)
GLOBULIN SER CALC-MCNC: 3.5 G/DL (ref 2–4)
GLUCOSE SERPL-MCNC: 89 MG/DL (ref 65–100)
GLUCOSE UR STRIP.AUTO-MCNC: NEGATIVE MG/DL
HCT VFR BLD AUTO: 40.5 % (ref 35–47)
HGB BLD-MCNC: 13.1 G/DL (ref 11.5–16)
HGB UR QL STRIP: NEGATIVE
HYALINE CASTS URNS QL MICRO: NORMAL /LPF (ref 0–2)
IMM GRANULOCYTES # BLD AUTO: 0 K/UL (ref 0–0.04)
IMM GRANULOCYTES NFR BLD AUTO: 1 % (ref 0–0.5)
KETONES UR QL STRIP.AUTO: NEGATIVE MG/DL
LEUKOCYTE ESTERASE UR QL STRIP.AUTO: NEGATIVE
LYMPHOCYTES # BLD: 1.6 K/UL (ref 0.8–3.5)
LYMPHOCYTES NFR BLD: 20 % (ref 12–49)
MAGNESIUM SERPL-MCNC: 2.4 MG/DL (ref 1.6–2.4)
MCH RBC QN AUTO: 29.6 PG (ref 26–34)
MCHC RBC AUTO-ENTMCNC: 32.3 G/DL (ref 30–36.5)
MCV RBC AUTO: 91.4 FL (ref 80–99)
MONOCYTES # BLD: 0.7 K/UL (ref 0–1)
MONOCYTES NFR BLD: 8 % (ref 5–13)
NEUTS SEG # BLD: 5.7 K/UL (ref 1.8–8)
NEUTS SEG NFR BLD: 71 % (ref 32–75)
NITRITE UR QL STRIP.AUTO: NEGATIVE
NRBC # BLD: 0 K/UL (ref 0–0.01)
NRBC BLD-RTO: 0 PER 100 WBC
NT PRO BNP: 284 PG/ML
PH UR STRIP: 7 (ref 5–8)
PLATELET # BLD AUTO: 286 K/UL (ref 150–400)
PMV BLD AUTO: 9.7 FL (ref 8.9–12.9)
POTASSIUM SERPL-SCNC: 4.1 MMOL/L (ref 3.5–5.1)
PROT SERPL-MCNC: 7.1 G/DL (ref 6.4–8.2)
PROT UR STRIP-MCNC: NEGATIVE MG/DL
RBC # BLD AUTO: 4.43 M/UL (ref 3.8–5.2)
RBC #/AREA URNS HPF: NORMAL /HPF (ref 0–5)
SODIUM SERPL-SCNC: 141 MMOL/L (ref 136–145)
SP GR UR REFRACTOMETRY: 1.01
TROPONIN I SERPL HS-MCNC: 9 NG/L (ref 0–51)
TSH SERPL DL<=0.05 MIU/L-ACNC: 3.85 UIU/ML (ref 0.36–3.74)
URINE CULTURE IF INDICATED: NORMAL
UROBILINOGEN UR QL STRIP.AUTO: 0.2 EU/DL (ref 0.2–1)
WBC # BLD AUTO: 8 K/UL (ref 3.6–11)
WBC URNS QL MICRO: NORMAL /HPF (ref 0–4)

## 2023-09-15 PROCEDURE — 82550 ASSAY OF CK (CPK): CPT

## 2023-09-15 PROCEDURE — 96372 THER/PROPH/DIAG INJ SC/IM: CPT

## 2023-09-15 PROCEDURE — 2580000003 HC RX 258: Performed by: EMERGENCY MEDICINE

## 2023-09-15 PROCEDURE — 85025 COMPLETE CBC W/AUTO DIFF WBC: CPT

## 2023-09-15 PROCEDURE — 1100000003 HC PRIVATE W/ TELEMETRY

## 2023-09-15 PROCEDURE — 80053 COMPREHEN METABOLIC PANEL: CPT

## 2023-09-15 PROCEDURE — 99285 EMERGENCY DEPT VISIT HI MDM: CPT

## 2023-09-15 PROCEDURE — 84484 ASSAY OF TROPONIN QUANT: CPT

## 2023-09-15 PROCEDURE — 2580000003 HC RX 258: Performed by: INTERNAL MEDICINE

## 2023-09-15 PROCEDURE — 72148 MRI LUMBAR SPINE W/O DYE: CPT

## 2023-09-15 PROCEDURE — 51701 INSERT BLADDER CATHETER: CPT

## 2023-09-15 PROCEDURE — 6360000002 HC RX W HCPCS: Performed by: INTERNAL MEDICINE

## 2023-09-15 PROCEDURE — 36415 COLL VENOUS BLD VENIPUNCTURE: CPT

## 2023-09-15 PROCEDURE — 71045 X-RAY EXAM CHEST 1 VIEW: CPT

## 2023-09-15 PROCEDURE — 84443 ASSAY THYROID STIM HORMONE: CPT

## 2023-09-15 PROCEDURE — 83735 ASSAY OF MAGNESIUM: CPT

## 2023-09-15 PROCEDURE — 83880 ASSAY OF NATRIURETIC PEPTIDE: CPT

## 2023-09-15 PROCEDURE — 6370000000 HC RX 637 (ALT 250 FOR IP): Performed by: INTERNAL MEDICINE

## 2023-09-15 PROCEDURE — 70551 MRI BRAIN STEM W/O DYE: CPT

## 2023-09-15 PROCEDURE — 81001 URINALYSIS AUTO W/SCOPE: CPT

## 2023-09-15 RX ORDER — ENOXAPARIN SODIUM 100 MG/ML
40 INJECTION SUBCUTANEOUS DAILY
Status: DISCONTINUED | OUTPATIENT
Start: 2023-09-15 | End: 2023-09-21 | Stop reason: HOSPADM

## 2023-09-15 RX ORDER — SODIUM CHLORIDE 0.9 % (FLUSH) 0.9 %
5-40 SYRINGE (ML) INJECTION EVERY 12 HOURS SCHEDULED
Status: DISCONTINUED | OUTPATIENT
Start: 2023-09-15 | End: 2023-09-21 | Stop reason: HOSPADM

## 2023-09-15 RX ORDER — CASTOR OIL AND BALSAM, PERU 788; 87 MG/G; MG/G
OINTMENT TOPICAL 2 TIMES DAILY
Status: DISCONTINUED | OUTPATIENT
Start: 2023-09-15 | End: 2023-09-21 | Stop reason: HOSPADM

## 2023-09-15 RX ORDER — ONDANSETRON 2 MG/ML
4 INJECTION INTRAMUSCULAR; INTRAVENOUS EVERY 6 HOURS PRN
Status: DISCONTINUED | OUTPATIENT
Start: 2023-09-15 | End: 2023-09-21 | Stop reason: HOSPADM

## 2023-09-15 RX ORDER — POLYETHYLENE GLYCOL 3350 17 G/17G
17 POWDER, FOR SOLUTION ORAL DAILY PRN
Status: DISCONTINUED | OUTPATIENT
Start: 2023-09-15 | End: 2023-09-21 | Stop reason: HOSPADM

## 2023-09-15 RX ORDER — SODIUM CHLORIDE 0.9 % (FLUSH) 0.9 %
5-40 SYRINGE (ML) INJECTION PRN
Status: DISCONTINUED | OUTPATIENT
Start: 2023-09-15 | End: 2023-09-21 | Stop reason: HOSPADM

## 2023-09-15 RX ORDER — ACETAMINOPHEN 325 MG/1
650 TABLET ORAL EVERY 6 HOURS PRN
Status: DISCONTINUED | OUTPATIENT
Start: 2023-09-15 | End: 2023-09-17 | Stop reason: SDUPTHER

## 2023-09-15 RX ORDER — 0.9 % SODIUM CHLORIDE 0.9 %
500 INTRAVENOUS SOLUTION INTRAVENOUS ONCE
Status: COMPLETED | OUTPATIENT
Start: 2023-09-15 | End: 2023-09-15

## 2023-09-15 RX ORDER — PREGABALIN 75 MG/1
75 CAPSULE ORAL 3 TIMES DAILY
Status: DISCONTINUED | OUTPATIENT
Start: 2023-09-15 | End: 2023-09-21 | Stop reason: HOSPADM

## 2023-09-15 RX ORDER — ATORVASTATIN CALCIUM 10 MG/1
10 TABLET, FILM COATED ORAL DAILY
Status: DISCONTINUED | OUTPATIENT
Start: 2023-09-15 | End: 2023-09-21 | Stop reason: HOSPADM

## 2023-09-15 RX ORDER — ACETAMINOPHEN 325 MG/1
650 TABLET ORAL EVERY 6 HOURS PRN
Status: DISCONTINUED | OUTPATIENT
Start: 2023-09-15 | End: 2023-09-21 | Stop reason: HOSPADM

## 2023-09-15 RX ORDER — SODIUM CHLORIDE 9 MG/ML
INJECTION, SOLUTION INTRAVENOUS PRN
Status: DISCONTINUED | OUTPATIENT
Start: 2023-09-15 | End: 2023-09-21 | Stop reason: HOSPADM

## 2023-09-15 RX ORDER — HYDROXYZINE HYDROCHLORIDE 25 MG/1
25 TABLET, FILM COATED ORAL 3 TIMES DAILY PRN
Status: DISCONTINUED | OUTPATIENT
Start: 2023-09-15 | End: 2023-09-21 | Stop reason: HOSPADM

## 2023-09-15 RX ORDER — ACETAMINOPHEN 650 MG/1
650 SUPPOSITORY RECTAL EVERY 6 HOURS PRN
Status: DISCONTINUED | OUTPATIENT
Start: 2023-09-15 | End: 2023-09-21 | Stop reason: HOSPADM

## 2023-09-15 RX ORDER — LEVOTHYROXINE SODIUM 0.05 MG/1
50 TABLET ORAL DAILY
Status: DISCONTINUED | OUTPATIENT
Start: 2023-09-16 | End: 2023-09-21 | Stop reason: HOSPADM

## 2023-09-15 RX ORDER — ONDANSETRON 4 MG/1
4 TABLET, ORALLY DISINTEGRATING ORAL EVERY 8 HOURS PRN
Status: DISCONTINUED | OUTPATIENT
Start: 2023-09-15 | End: 2023-09-21 | Stop reason: HOSPADM

## 2023-09-15 RX ADMIN — PREGABALIN 75 MG: 75 CAPSULE ORAL at 20:07

## 2023-09-15 RX ADMIN — ENOXAPARIN SODIUM 40 MG: 100 INJECTION SUBCUTANEOUS at 17:35

## 2023-09-15 RX ADMIN — SODIUM CHLORIDE 500 ML: 9 INJECTION, SOLUTION INTRAVENOUS at 12:19

## 2023-09-15 RX ADMIN — ATORVASTATIN CALCIUM 10 MG: 10 TABLET, FILM COATED ORAL at 17:34

## 2023-09-15 RX ADMIN — SODIUM CHLORIDE, PRESERVATIVE FREE 10 ML: 5 INJECTION INTRAVENOUS at 20:08

## 2023-09-15 RX ADMIN — PREGABALIN 75 MG: 75 CAPSULE ORAL at 17:35

## 2023-09-15 ASSESSMENT — PAIN SCALES - GENERAL
PAINLEVEL_OUTOF10: 0
PAINLEVEL_OUTOF10: 0

## 2023-09-15 ASSESSMENT — PAIN - FUNCTIONAL ASSESSMENT: PAIN_FUNCTIONAL_ASSESSMENT: 0-10

## 2023-09-15 NOTE — ED NOTES
Verbal shift change report given to Carola RN (oncoming nurse) by Ian Nunez RN (offgoing nurse). Report included the following information Nurse Handoff Report, ED SBAR, Intake/Output, MAR, and Recent Results.         Agustina Bautista, RN  09/15/23 [id number]

## 2023-09-15 NOTE — PROGRESS NOTES
End of Shift Note    Bedside shift change report given to Blanca Deluna RN (oncoming nurse) by Jada Saeed RN (offgoing nurse). Report included the following information Nurse Handoff Report      Shift worked:  days   Shift summary and any significant changes:     Patient arrived on unit this afternoon. Admission database completed.        Concerns for physician to address:  None   Zone phone for oncoming shift:   [phone]     Patient Information  Hemant Weathers  82 y.o.  9/15/2023 10:44 AM by Anthony Camarena MD. Hemant Weathers was admitted from Home    Problem List  Patient Active Problem List    Diagnosis Date Noted    Weakness generalized 09/15/2023    Easy bruising 08/31/2017    History of rheumatic fever 08/31/2017    Age-related osteoporosis without current pathological fracture 08/31/2017    Personal history of DVT (deep vein thrombosis) 12/21/2016    History of shingles 12/21/2016    Irritable bowel syndrome without diarrhea 03/17/2016    Hypothyroidism 12/08/2014    Spinal stenosis, lumbar 12/08/2014    Hyperlipidemia 12/08/2014    Degenerative disc disease, lumbar 12/08/2014     Past Medical History:   Diagnosis Date    Adverse effect of anesthesia     decreased BP with nitrous oxide    Chronic kidney disease     kidney stones    Chronic pain     neuropathy - burning feeling    Degenerative disc disease     Hypothyroidism     Ill-defined condition     genital warts    Ill-defined condition     neuropathy in torso    Ill-defined condition     osteopenia    Ill-defined condition     stigmatism right eye    Ill-defined condition     IBS    Ill-defined condition     phlebitis    Ill-defined condition     hx chickenpox/shingles    Ill-defined condition     hx double pneumonia    Ill-defined condition     \"stomach moved over after fall\" per pt per MD    Ill-defined condition     as child chronic tonsillitis/strep/gets infections easily per pt    Lumbar stenosis     Rheumatic fever     Thromboembolus (HCC)     R leg    Tremor

## 2023-09-15 NOTE — ED TRIAGE NOTES
Pt arrives via EMS from home for bilateral lower extremity weakness. Pt reports she \"has been having trouble with both legs for years\". Pt reports she usually ambulates with a walker at home but has been unable to get up and ambulate for the last two days. Pt reports neuropathy down her spinal cord. Denies chest pain or SOB.

## 2023-09-15 NOTE — ED PROVIDER NOTES
\[facility]\"" EMERGENCY DEPT  EMERGENCY DEPARTMENT ENCOUNTER       Pt Name: Conway Gowers  MRN: [id number]  [address] [facility] [place] 1940  Date of evaluation: 9/15/2023  Provider: Angelita Alvarez DO   PCP: Ama Crow MD  Note Started: 12:33 PM EDT 9/15/23     CHIEF COMPLAINT       Chief Complaint   Patient presents with    Extremity Weakness     Pt arrives via EMS from home for bilateral lower extremity weakness. Pt reports she \"has been having trouble with both legs for years\". Pt reports she usually ambulates with a walker at home but has been unable to get up and ambulate for the last two days. Pt reports neuropathy down her spinal cord. Denies chest pain or SOB. HISTORY OF PRESENT ILLNESS: 1 or more elements      History From: patient, History limited by: none     Conway Gowers is a 82 y.o. female presents to the ED by EMS for inability to walk. Pt states she has prior hx of back issues. She states she normally walks with rollator however in the past two days she has not been able to get up out of the bed. She denies any recent trauma. She denies any headache. She denies any CP or SOA. She denies any abd pain, n/v/d, constipation. She denies ay urinary symptoms. She has incontinence issues of the bladder. There has been no bowel incontinence. She denies any loass of sensation to the lower extremities. Please See MDM for Additional Details of the HPI/PMH  Nursing Notes were all reviewed and agreed with or any disagreements were addressed in the HPI. REVIEW OF SYSTEMS        Positives and Pertinent negatives as per HPI.     PAST HISTORY     Past Medical History:  Past Medical History:   Diagnosis Date    Adverse effect of anesthesia     decreased BP with nitrous oxide    Chronic kidney disease     kidney stones    Chronic pain     neuropathy - burning feeling    Degenerative disc disease     Hypothyroidism     Ill-defined condition     genital warts    Ill-defined condition     neuropathy in torso    Ill-defined condition

## 2023-09-15 NOTE — CARE COORDINATION
Care Management Initial Assessment       RUR: Not listed on chart   Readmission? No  1st IM letter given? No, to be provided by Patient Access  1st  letter given: No      Initial note: CM reviewed chart. CM completed assessment with pt at bedside. CM introduced self, role of CM, verified demographics, and discussed transition of care planning. Pt resides with spouse, who is pt's main support. She endorses difficulty with her legs for years, which has prompted her to use a rolling walker for ambulation. She reports yesterday that she was no longer able to walk or stand. Pt's spouse assists with ADLs as needed at home. Of note, pt's spouse had a stroke 7/22/23. Pt endorses limited support to her and her spouse, reports that SW is working with her spouse from his rehabilitation stay and trying to \"put support in place for them. \" Recommend PT/OT evaluations to determine appropriate disposition for pt. Pt owns a rolling walker, no other DME reported. Pt has previous OP PT hx, denied SNF/IPR. Care management will continue to be available to assist as transition of care planning needs arise. Full assessment below:         09/15/23 4768   Service Assessment   Patient Orientation Alert and Oriented   Cognition Alert   History Provided By Patient   Primary Caregiver Self   Support Systems Spouse/Significant Other   Patient's Healthcare Decision Maker is: Legal Next of Kin   PCP Verified by CM No  (Pt reports her PCP is a Higinio Lazar, KENDALL unable to locate a provider with this name)   Prior Functional Level Assistance with the following:;Cooking;Housework; Mobility; Shopping   Current Functional Level Assistance with the following:;Mobility; Toileting; Bathing;Dressing;Cooking;Housework; Shopping   Can patient return to prior living arrangement Unknown at present   Ability to make needs known: Good   Family able to assist with home care needs: Yes   Would you like for me to discuss the discharge plan with any other family

## 2023-09-15 NOTE — ED NOTES
Pt found to have voided in bed. Linens changed. Straight cath completed with 900 ml of output.       Parth Ritter RN  09/15/23 [id number]

## 2023-09-15 NOTE — H&P
Hospitalist Admission Note    NAME:   Jovita Weathers   : 1940   MRN: [id number]     Date/Time: 9/15/2023 4:24 PM    Patient PCP: Mynor Garcia MD    ______________________________________________________________________  Given the patient's current clinical presentation, I have a high level of concern for decompensation if discharged from the emergency department. Complex decision making was performed, which includes reviewing the patient's available past medical records, laboratory results, and x-ray films. My assessment of this patient's clinical condition and my plan of care is as follows. Assessment / Plan:  Bilateral lower extremity weakness most likely due to lumbar spondylosis  Ambulatory dysfunction  Hypothyroidism  We will admit under observation, MRI of the lumbar spine showed   1. Grade 1 anterolisthesis of L4 on L5 and L5 on S1.  2. Multilevel spondylosis as above, worst at L4-5. Will consult spine surgery, PT OT  Continue with home dose Synthroid, continue with Lyrica    Elevated blood pressure  Acute kidney injury  We will start the IV fluid normal saline, as needed hydralazine  No BP meds at home    Medical Decision Making:   I personally reviewed labs:  cbc bmp   I personally reviewed imaging:MRI lumbar spine   I personally reviewed EKG:  Toxic drug monitoring: cbc while on lovenox   Discussed case with: ED provider. After discussion I am in agreement that acuity of patient's medical condition necessitates hospital stay. Code Status: full code   DVT Prophylaxis: lovenox   GI Prophylaxis:  Baseline: ambulatory     Subjective:   CHIEF COMPLAINT: Bilateral lower extremity weakness    HISTORY OF PRESENT ILLNESS:     Libby Cavanaugh is a 82 y.o.  female with PMHx significant for hypothyroidism, neuropathy who presented to the ED with complaining of bilateral lower extremity weakness with inability to ambulate.   Patient normally walks with a rollator however for the past 2 days, she has not Negative NEG      Blood, Urine Negative NEG      Urobilinogen, Urine 0.2 0.2 - 1.0 EU/dL    Nitrite, Urine Negative NEG      Leukocyte Esterase, Urine Negative NEG      Urine Culture if Indicated CULTURE NOT INDICATED BY UA RESULT CNI      WBC, UA 0-4 0 - 4 /hpf    RBC, UA 0-5 0 - 5 /hpf    Epithelial Cells UA FEW FEW /lpf    BACTERIA, URINE Negative NEG /hpf    Hyaline Casts, UA 0-2 0 - 2 /lpf         MRI LUMBAR SPINE WO CONTRAST    Result Date: 9/15/2023  EXAM: MRI LUMBAR SPINE WO CONTRAST INDICATION: Low back, progressive weakness in legs, now can't stand COMPARISON: Radiographs 8/22/2011 TECHNIQUE: MR imaging of the lumbar spine was performed using the following sequences: sagittal T1, T2, STIR;  axial T1, T2. CONTRAST:  None. FINDINGS: There is grade 1 anterolisthesis of L4 on L5 measuring 6 mm. There is grade 1 anterolisthesis of L5 on S1 measuring 6 mm. There is mild rightward curvature of the thoracolumbar junction. Vertebral body heights are maintained. Marrow signal is normal. The conus medullaris terminates at L1-2. Signal and caliber of the distal spinal cord are within normal limits. The paraspinal soft tissues are within normal limits. Lower thoracic spine: No herniation or stenosis. L1-L2: No herniation or stenosis. L2-L3: Mild broad-based disc bulge is greater towards the right. There is mild spinal stenosis with greater impingement right lateral recess. No significant neural foraminal narrowing. L3-L4: Mild broad-based disc bulge with thickening of ligamentum flavum causing mild to moderate spinal stenosis. No significant neural foraminal narrowing. L4-L5: Mild disc space narrowing. There is grade 1 anterolisthesis. This causes unroofing of the posterior aspect disc with a mild broad-based disc bulge and moderate to severe spinal stenosis. There is mild bilateral neural foraminal narrowing. Severe bilateral posterior facet arthropathy. L5-S1: Mild disc space narrowing. Grade 1 anterolisthesis.  This

## 2023-09-16 ENCOUNTER — APPOINTMENT (OUTPATIENT)
Facility: HOSPITAL | Age: 83
End: 2023-09-16
Payer: MEDICARE

## 2023-09-16 PROBLEM — M51.369 LUMBAR DEGENERATIVE DISC DISEASE: Status: ACTIVE | Noted: 2023-01-01

## 2023-09-16 PROBLEM — R29.898 BILATERAL LEG WEAKNESS: Status: ACTIVE | Noted: 2023-09-16

## 2023-09-16 PROBLEM — G62.9 NEUROPATHY: Status: ACTIVE | Noted: 2023-09-16

## 2023-09-16 PROBLEM — M51.36 LUMBAR DEGENERATIVE DISC DISEASE: Status: ACTIVE | Noted: 2023-09-16

## 2023-09-16 PROBLEM — G25.0 ESSENTIAL TREMOR: Status: ACTIVE | Noted: 2023-09-16

## 2023-09-16 PROBLEM — R26.2 UNABLE TO AMBULATE: Status: ACTIVE | Noted: 2023-09-16

## 2023-09-16 LAB
ANION GAP SERPL CALC-SCNC: 4 MMOL/L (ref 5–15)
BASOPHILS # BLD: 0 K/UL (ref 0–0.1)
BASOPHILS NFR BLD: 0 % (ref 0–1)
BUN SERPL-MCNC: 13 MG/DL (ref 6–20)
BUN/CREAT SERPL: 12 (ref 12–20)
CALCIUM SERPL-MCNC: 9 MG/DL (ref 8.5–10.1)
CHLORIDE SERPL-SCNC: 111 MMOL/L (ref 97–108)
CO2 SERPL-SCNC: 25 MMOL/L (ref 21–32)
CREAT SERPL-MCNC: 1.08 MG/DL (ref 0.55–1.02)
DIFFERENTIAL METHOD BLD: NORMAL
EKG ATRIAL RATE: 63 BPM
EKG DIAGNOSIS: NORMAL
EKG P AXIS: 36 DEGREES
EKG P-R INTERVAL: 186 MS
EKG Q-T INTERVAL: 432 MS
EKG QRS DURATION: 84 MS
EKG QTC CALCULATION (BAZETT): 442 MS
EKG R AXIS: -7 DEGREES
EKG T AXIS: 26 DEGREES
EKG VENTRICULAR RATE: 63 BPM
EOSINOPHIL # BLD: 0 K/UL (ref 0–0.4)
EOSINOPHIL NFR BLD: 0 % (ref 0–7)
ERYTHROCYTE [DISTWIDTH] IN BLOOD BY AUTOMATED COUNT: 14.3 % (ref 11.5–14.5)
GLUCOSE SERPL-MCNC: 92 MG/DL (ref 65–100)
HCT VFR BLD AUTO: 35.9 % (ref 35–47)
HGB BLD-MCNC: 11.9 G/DL (ref 11.5–16)
IMM GRANULOCYTES # BLD AUTO: 0 K/UL (ref 0–0.04)
IMM GRANULOCYTES NFR BLD AUTO: 0 % (ref 0–0.5)
LYMPHOCYTES # BLD: 1.4 K/UL (ref 0.8–3.5)
LYMPHOCYTES NFR BLD: 18 % (ref 12–49)
MCH RBC QN AUTO: 29.9 PG (ref 26–34)
MCHC RBC AUTO-ENTMCNC: 33.1 G/DL (ref 30–36.5)
MCV RBC AUTO: 90.2 FL (ref 80–99)
MONOCYTES # BLD: 0.8 K/UL (ref 0–1)
MONOCYTES NFR BLD: 10 % (ref 5–13)
NEUTS SEG # BLD: 5.6 K/UL (ref 1.8–8)
NEUTS SEG NFR BLD: 72 % (ref 32–75)
NRBC # BLD: 0 K/UL (ref 0–0.01)
NRBC BLD-RTO: 0 PER 100 WBC
PLATELET # BLD AUTO: 291 K/UL (ref 150–400)
PMV BLD AUTO: 9.4 FL (ref 8.9–12.9)
POTASSIUM SERPL-SCNC: 4 MMOL/L (ref 3.5–5.1)
RBC # BLD AUTO: 3.98 M/UL (ref 3.8–5.2)
SODIUM SERPL-SCNC: 140 MMOL/L (ref 136–145)
WBC # BLD AUTO: 7.8 K/UL (ref 3.6–11)

## 2023-09-16 PROCEDURE — 99223 1ST HOSP IP/OBS HIGH 75: CPT | Performed by: PSYCHIATRY & NEUROLOGY

## 2023-09-16 PROCEDURE — 36415 COLL VENOUS BLD VENIPUNCTURE: CPT

## 2023-09-16 PROCEDURE — 72128 CT CHEST SPINE W/O DYE: CPT

## 2023-09-16 PROCEDURE — 6360000002 HC RX W HCPCS: Performed by: INTERNAL MEDICINE

## 2023-09-16 PROCEDURE — G0378 HOSPITAL OBSERVATION PER HR: HCPCS

## 2023-09-16 PROCEDURE — 85025 COMPLETE CBC W/AUTO DIFF WBC: CPT

## 2023-09-16 PROCEDURE — 2580000003 HC RX 258: Performed by: INTERNAL MEDICINE

## 2023-09-16 PROCEDURE — 80048 BASIC METABOLIC PNL TOTAL CA: CPT

## 2023-09-16 PROCEDURE — 72125 CT NECK SPINE W/O DYE: CPT

## 2023-09-16 PROCEDURE — 96372 THER/PROPH/DIAG INJ SC/IM: CPT

## 2023-09-16 PROCEDURE — 6370000000 HC RX 637 (ALT 250 FOR IP): Performed by: INTERNAL MEDICINE

## 2023-09-16 PROCEDURE — 6370000000 HC RX 637 (ALT 250 FOR IP): Performed by: PSYCHIATRY & NEUROLOGY

## 2023-09-16 RX ORDER — PRIMIDONE 50 MG/1
50 TABLET ORAL EVERY 8 HOURS SCHEDULED
Status: DISCONTINUED | OUTPATIENT
Start: 2023-09-16 | End: 2023-09-21 | Stop reason: HOSPADM

## 2023-09-16 RX ADMIN — Medication: at 19:51

## 2023-09-16 RX ADMIN — ENOXAPARIN SODIUM 40 MG: 100 INJECTION SUBCUTANEOUS at 10:34

## 2023-09-16 RX ADMIN — ATORVASTATIN CALCIUM 10 MG: 10 TABLET, FILM COATED ORAL at 10:33

## 2023-09-16 RX ADMIN — ACETAMINOPHEN 650 MG: 325 TABLET ORAL at 10:35

## 2023-09-16 RX ADMIN — Medication: at 10:38

## 2023-09-16 RX ADMIN — PREGABALIN 75 MG: 75 CAPSULE ORAL at 14:23

## 2023-09-16 RX ADMIN — PRIMIDONE 50 MG: 50 TABLET ORAL at 14:23

## 2023-09-16 RX ADMIN — SODIUM CHLORIDE, PRESERVATIVE FREE 10 ML: 5 INJECTION INTRAVENOUS at 10:39

## 2023-09-16 RX ADMIN — LEVOTHYROXINE SODIUM 50 MCG: 0.05 TABLET ORAL at 06:05

## 2023-09-16 RX ADMIN — PREGABALIN 75 MG: 75 CAPSULE ORAL at 10:33

## 2023-09-16 RX ADMIN — SODIUM CHLORIDE, PRESERVATIVE FREE 10 ML: 5 INJECTION INTRAVENOUS at 19:52

## 2023-09-16 RX ADMIN — PREGABALIN 75 MG: 75 CAPSULE ORAL at 19:51

## 2023-09-16 RX ADMIN — Medication: at 00:56

## 2023-09-16 RX ADMIN — PRIMIDONE 50 MG: 50 TABLET ORAL at 21:30

## 2023-09-16 ASSESSMENT — PAIN SCALES - GENERAL
PAINLEVEL_OUTOF10: 0
PAINLEVEL_OUTOF10: 0
PAINLEVEL_OUTOF10: 5

## 2023-09-16 ASSESSMENT — PAIN DESCRIPTION - ORIENTATION: ORIENTATION: LEFT

## 2023-09-16 ASSESSMENT — PAIN DESCRIPTION - LOCATION: LOCATION: HEAD

## 2023-09-16 ASSESSMENT — PAIN DESCRIPTION - DESCRIPTORS: DESCRIPTORS: ACHING

## 2023-09-16 NOTE — PROGRESS NOTES
Physical Therapy    Received order, pt awaiting neurosurgery consult. Will follow.      Sharri Montelongo, PT

## 2023-09-16 NOTE — PROGRESS NOTES
No  PNA:NoNo    Activity:     Number times ambulated in hallways past shift: 0  Number of times OOB to chair past shift: 0    Cardiac:   Cardiac Monitoring: Yes           Access:   Current line(s): PIV  Central Line? No Placement date  Reason Medically Necessary   PICC LINE? No Placement date Reason Medically Necessary     Genitourinary:   Urinary status: voiding   Urinary Catheter? No     Respiratory:   O2 Device: None (Room air)  Chronic home O2 use?: no  Incentive spirometer at bedside: no       GI:     Current diet:  ADULT DIET; Regular  Passing flatus: yes  Tolerating current diet: yes       Pain Management:   Patient states pain is manageable on current regimen: yes    Skin:  Sathish Scale Score: 17  Interventions: increase time out of bed, PT/OT consult, internal/external urinary devices, and nutritional support    Patient Safety:  Fall Score:  Zamudio Total Score: 85  Interventions: bed/chair alarm, assistive devices (walker, cane, etc.), gripper socks, pt to call before getting OOB, and stay with me (per policy)     @Rollbelt  @dexterity to release roll belt  Yes/No ( must document dexterity  here by stating Yes or No here, otherwise this is a restraint and must follow restraint documentation policy.)    DVT prophylaxis:  DVT prophylaxis Med- yes  DVT prophylaxis SCD or NIKA- no     Wounds: (If Applicable)  Wounds- no  Location     Active Consults:  IP CONSULT TO ORTHOPEDIC SURGERY  IP CONSULT TO NEUROLOGY    Length of Stay:  Expected LOS: 3  Actual LOS: 0  Discharge Plan: no TBD      Bg Banks RN

## 2023-09-16 NOTE — PROGRESS NOTES
Came to see pt. She was off the floor for study. Lumbar spondylosis and spondylolisthesis chronic.   Checking rostral cts for pathology

## 2023-09-16 NOTE — PROGRESS NOTES
Occupational Therapy  OT consult received, chart reviewed. Patient is pending ortho and neurosurgery consults. Will defer for now but continue to follow.
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
## 2023-09-17 ENCOUNTER — APPOINTMENT (OUTPATIENT)
Facility: HOSPITAL | Age: 83
End: 2023-09-17
Payer: MEDICARE

## 2023-09-17 LAB — VIT B12 SERPL-MCNC: 329 PG/ML (ref 193–986)

## 2023-09-17 PROCEDURE — 6360000002 HC RX W HCPCS: Performed by: INTERNAL MEDICINE

## 2023-09-17 PROCEDURE — 72141 MRI NECK SPINE W/O DYE: CPT

## 2023-09-17 PROCEDURE — 96372 THER/PROPH/DIAG INJ SC/IM: CPT

## 2023-09-17 PROCEDURE — 99232 SBSQ HOSP IP/OBS MODERATE 35: CPT | Performed by: PSYCHIATRY & NEUROLOGY

## 2023-09-17 PROCEDURE — 6370000000 HC RX 637 (ALT 250 FOR IP): Performed by: INTERNAL MEDICINE

## 2023-09-17 PROCEDURE — 82607 VITAMIN B-12: CPT

## 2023-09-17 PROCEDURE — 2580000003 HC RX 258: Performed by: INTERNAL MEDICINE

## 2023-09-17 PROCEDURE — 36415 COLL VENOUS BLD VENIPUNCTURE: CPT

## 2023-09-17 PROCEDURE — G0378 HOSPITAL OBSERVATION PER HR: HCPCS

## 2023-09-17 PROCEDURE — 6370000000 HC RX 637 (ALT 250 FOR IP): Performed by: PSYCHIATRY & NEUROLOGY

## 2023-09-17 PROCEDURE — 6370000000 HC RX 637 (ALT 250 FOR IP): Performed by: STUDENT IN AN ORGANIZED HEALTH CARE EDUCATION/TRAINING PROGRAM

## 2023-09-17 PROCEDURE — 72146 MRI CHEST SPINE W/O DYE: CPT

## 2023-09-17 RX ORDER — AMLODIPINE BESYLATE 5 MG/1
5 TABLET ORAL DAILY
Status: DISCONTINUED | OUTPATIENT
Start: 2023-09-17 | End: 2023-09-21 | Stop reason: HOSPADM

## 2023-09-17 RX ADMIN — PRIMIDONE 50 MG: 50 TABLET ORAL at 20:56

## 2023-09-17 RX ADMIN — LEVOTHYROXINE SODIUM 50 MCG: 0.05 TABLET ORAL at 05:13

## 2023-09-17 RX ADMIN — ENOXAPARIN SODIUM 40 MG: 100 INJECTION SUBCUTANEOUS at 09:33

## 2023-09-17 RX ADMIN — Medication: at 20:57

## 2023-09-17 RX ADMIN — PRIMIDONE 50 MG: 50 TABLET ORAL at 12:40

## 2023-09-17 RX ADMIN — PREGABALIN 75 MG: 75 CAPSULE ORAL at 12:40

## 2023-09-17 RX ADMIN — PREGABALIN 75 MG: 75 CAPSULE ORAL at 20:56

## 2023-09-17 RX ADMIN — AMLODIPINE BESYLATE 5 MG: 5 TABLET ORAL at 12:40

## 2023-09-17 RX ADMIN — SODIUM CHLORIDE, PRESERVATIVE FREE 10 ML: 5 INJECTION INTRAVENOUS at 09:34

## 2023-09-17 RX ADMIN — Medication: at 09:34

## 2023-09-17 RX ADMIN — PREGABALIN 75 MG: 75 CAPSULE ORAL at 09:33

## 2023-09-17 RX ADMIN — PRIMIDONE 50 MG: 50 TABLET ORAL at 05:13

## 2023-09-17 RX ADMIN — ATORVASTATIN CALCIUM 10 MG: 10 TABLET, FILM COATED ORAL at 09:33

## 2023-09-17 RX ADMIN — SODIUM CHLORIDE, PRESERVATIVE FREE 10 ML: 5 INJECTION INTRAVENOUS at 20:57

## 2023-09-17 ASSESSMENT — PAIN SCALES - GENERAL: PAINLEVEL_OUTOF10: 0

## 2023-09-17 NOTE — PROGRESS NOTES
Physical Therapy  Will defer PT evaluation today as patient waiting for MRI. Will continue to follow.

## 2023-09-17 NOTE — PROGRESS NOTES
Pt seen and examined. Numbness from knees down primarily but more proximal weakness in legs,  Likely not all related to lumbar stenosis.   Need mri c and t spine

## 2023-09-17 NOTE — PROGRESS NOTES
End of Shift Note     Bedside shift change report given to Carolina Morillo Dr.  (oncoming nurse) by Subhash Nolasco RN  (offgoing nurse).   Report included the following information Nurse Handoff Report        Shift worked: Days   Shift summary and any significant changes:     Neuro came to bedside   MRI of cervical and thoracic spine completed  Case management following for discharge needs         Concerns for physician to address:  None   Zone phone for oncoming shift:   [phone]      Patient Information  Harrison Weathers  82 y.o.  9/15/2023 10:44 AM by Lilli Hernandes MD. Harrison Weathers was admitted from Home     Problem List          Patient Active Problem List     Diagnosis Date Noted    Weakness generalized 09/15/2023    Easy bruising 08/31/2017    History of rheumatic fever 08/31/2017    Age-related osteoporosis without current pathological fracture 08/31/2017    Personal history of DVT (deep vein thrombosis) 12/21/2016    History of shingles 12/21/2016    Irritable bowel syndrome without diarrhea 03/17/2016    Hypothyroidism 12/08/2014    Spinal stenosis, lumbar 12/08/2014    Hyperlipidemia 12/08/2014    Degenerative disc disease, lumbar 12/08/2014      Past Medical History           Past Medical History:   Diagnosis Date    Adverse effect of anesthesia       decreased BP with nitrous oxide    Chronic kidney disease       kidney stones    Chronic pain       neuropathy - burning feeling    Degenerative disc disease      Hypothyroidism      Ill-defined condition       genital warts    Ill-defined condition       neuropathy in torso    Ill-defined condition       osteopenia    Ill-defined condition       stigmatism right eye    Ill-defined condition       IBS    Ill-defined condition       phlebitis    Ill-defined condition       hx chickenpox/shingles    Ill-defined condition       hx double pneumonia    Ill-defined condition       \"stomach moved over after fall\" per pt per MD    Ill-defined condition       as child chronic

## 2023-09-17 NOTE — CONSULTS
Neurology Note    Patient ID:  Deng Smith  [id number]  82 y.o.  1940      Date of Consultation:  September 17, 2023    Assessment and Plan:    The patient is a 57-year-old female who presents with bilateral weakness in her lower extremities, gait difficulty. Her examination does reveal a rather significant essential tremor, reduced reflexes in her lower extremities and decreased sensation. Gait instability and frequent falls:  Differential includes associated with lumbar degenerative spine disease, arthristis in her knees, peripheral neuropathy, metabolic derangement, associated with her tremor. This is most likely multifactorial in etiology  Lumbar spine MRI does reveal multilevel degenerative disease  CT of the cervical and thoracic spine with mild degenerative changes  Neurosurgery has evaluated patient and recommended MRI of the cervical and thoracic spine. There is an element of a neuropathy  Vitamin B12 level pending  Ultimately, the patient will need an EMG/nerve conduction study as an outpatient for the peripheral neuropathy. Will attempt to obtain as an inpatient  Pt, ot    Essential tremor:  Brain MRI with generalized atrophy but no etiology to explain her tremor  Tremor does seem to be a bit better today  Continue with low-dose primidone. Anxiety  Hypothyroidism  Hypertension  PTSD      Neurology will continue to follow closely in this complicated patient with ongoing neurological symptoms necessitating additional testing. Care plan discussed with primary team.            Subjective: my tremor is a little better       History of Present Illness:   Deng Smith is a 82 y.o. female with a history of hypothyroidism, peripheral neuropathy who presented to the hospital with bilateral lower extremity weakness and inability to ambulate. The patient typically walks with a rollator however this has become more difficult over the past couple days. She denies any incontinence.   The patient states

## 2023-09-17 NOTE — PROGRESS NOTES
Occupational Therapy  Medical record reviewed. Pt has had neurosurgery consult and an MRI ( cervical and thoracic spine) has been recommended. Will defer pending results of MRI  and POC per neurosurgery. Will continue to follow as appropriate.

## 2023-09-18 PROCEDURE — 6370000000 HC RX 637 (ALT 250 FOR IP): Performed by: PSYCHIATRY & NEUROLOGY

## 2023-09-18 PROCEDURE — G0378 HOSPITAL OBSERVATION PER HR: HCPCS

## 2023-09-18 PROCEDURE — 97530 THERAPEUTIC ACTIVITIES: CPT

## 2023-09-18 PROCEDURE — 95885 MUSC TST DONE W/NERV TST LIM: CPT | Performed by: PSYCHIATRY & NEUROLOGY

## 2023-09-18 PROCEDURE — 96372 THER/PROPH/DIAG INJ SC/IM: CPT

## 2023-09-18 PROCEDURE — 97535 SELF CARE MNGMENT TRAINING: CPT

## 2023-09-18 PROCEDURE — 97165 OT EVAL LOW COMPLEX 30 MIN: CPT

## 2023-09-18 PROCEDURE — 95910 NRV CNDJ TEST 7-8 STUDIES: CPT | Performed by: PSYCHIATRY & NEUROLOGY

## 2023-09-18 PROCEDURE — 6370000000 HC RX 637 (ALT 250 FOR IP): Performed by: STUDENT IN AN ORGANIZED HEALTH CARE EDUCATION/TRAINING PROGRAM

## 2023-09-18 PROCEDURE — 97161 PT EVAL LOW COMPLEX 20 MIN: CPT

## 2023-09-18 PROCEDURE — 6370000000 HC RX 637 (ALT 250 FOR IP): Performed by: INTERNAL MEDICINE

## 2023-09-18 PROCEDURE — 2580000003 HC RX 258: Performed by: INTERNAL MEDICINE

## 2023-09-18 PROCEDURE — 6360000002 HC RX W HCPCS: Performed by: INTERNAL MEDICINE

## 2023-09-18 PROCEDURE — 99232 SBSQ HOSP IP/OBS MODERATE 35: CPT | Performed by: PSYCHIATRY & NEUROLOGY

## 2023-09-18 RX ADMIN — PRIMIDONE 50 MG: 50 TABLET ORAL at 13:32

## 2023-09-18 RX ADMIN — PREGABALIN 75 MG: 75 CAPSULE ORAL at 09:15

## 2023-09-18 RX ADMIN — ENOXAPARIN SODIUM 40 MG: 100 INJECTION SUBCUTANEOUS at 09:15

## 2023-09-18 RX ADMIN — PREGABALIN 75 MG: 75 CAPSULE ORAL at 20:46

## 2023-09-18 RX ADMIN — LEVOTHYROXINE SODIUM 50 MCG: 0.05 TABLET ORAL at 05:30

## 2023-09-18 RX ADMIN — PRIMIDONE 50 MG: 50 TABLET ORAL at 05:30

## 2023-09-18 RX ADMIN — Medication: at 09:14

## 2023-09-18 RX ADMIN — ATORVASTATIN CALCIUM 10 MG: 10 TABLET, FILM COATED ORAL at 09:15

## 2023-09-18 RX ADMIN — SODIUM CHLORIDE, PRESERVATIVE FREE 10 ML: 5 INJECTION INTRAVENOUS at 09:15

## 2023-09-18 RX ADMIN — PREGABALIN 75 MG: 75 CAPSULE ORAL at 13:32

## 2023-09-18 RX ADMIN — PRIMIDONE 50 MG: 50 TABLET ORAL at 20:46

## 2023-09-18 RX ADMIN — SODIUM CHLORIDE, PRESERVATIVE FREE 10 ML: 5 INJECTION INTRAVENOUS at 20:46

## 2023-09-18 RX ADMIN — POLYETHYLENE GLYCOL 3350 17 G: 17 POWDER, FOR SOLUTION ORAL at 12:13

## 2023-09-18 RX ADMIN — Medication: at 20:46

## 2023-09-18 RX ADMIN — AMLODIPINE BESYLATE 5 MG: 5 TABLET ORAL at 09:15

## 2023-09-18 ASSESSMENT — PAIN SCALES - GENERAL: PAINLEVEL_OUTOF10: 0

## 2023-09-18 NOTE — CARE COORDINATION
Transition of Care Plan:    RUR:  OBS  Prior Level of Functioning: Independent  Disposition: Plan for Acute Rehab:     3:54 PM  CM completed chart review. Noted that therapy rec Acute Rehab:   Offered [facility] and Pt and spouse on the phone want [facility]REG: He just was DC from [facility] and wants to try to get her in there. Referral sent to [facility] Pending  [facility]: Pending  Insurance is [facility] and will require auth. - Pt stated that they are over income for Medicaid. He is going to look into private duty caregiver for them if needed in the future. Pt spouse stated he is working with his insurance and [employer] to try to keep them at home for as long as possible. Pt has a friend that can bring more clothes to her at rehab. Spouse can not drive for 6 months. Spouse indicated that [employer] [facility] has already screened them for Medicaid and they are over income. CM talked to them about possibly hiring private duty CG or looking at [facility] in the future when she comes home if needed. If SNF or IPR: Date FOC offered: 9/18  Date FOC received: 9/18  Accepting facility:   Date authorization started with reference number:   Date authorization received and expires: Follow up appointments: after rehab. DME needed: tbd  Transportation at discharge: BLS transport will need to be arranged. IM/IMM Medicare/ letter given: 1st IM letter 9/16  Is patient a [place] and connected with [facility]? no   If yes, was [employer] transfer form completed and VA notified? Caregiver Contact: Pallavi Cabrera Core: [phone]  Discharge Caregiver contacted prior to discharge? Yes via phone  Care Conference needed? no  Barriers to discharge: placement. Auth. CM will continue to monitor discharge plan.      Jean Marie Knapp, [facility]  Ext [phone]

## 2023-09-18 NOTE — PROGRESS NOTES
Neurology Note    Patient ID:  Christina Foster  [id number]  82 y.o.  1940      Date of Consultation:  September 18, 2023    Assessment and Plan:    The patient is a 46-year-old female who presents with bilateral weakness in her lower extremities, gait difficulty. Her examination does reveal a rather significant essential tremor, reduced reflexes in her lower extremities and decreased sensation. Gait instability and frequent falls:  Differential includes associated with lumbar degenerative spine disease, arthristis in her knees, peripheral neuropathy, metabolic derangement, associated with her tremor. This is most likely multifactorial in etiology  Lumbar spine MRI does reveal multilevel degenerative disease  MRI of the cervical  with disease, worse at C5-C6  Neurosurgery following  EMG with no evidence of large fiber neuropathy. most likely small fiber component. Chronic lumbar radiculopathy  More detailed emg as an outpatient. There is an element of a neuropathy  Vitamin B12 level  normal  Pt, ot    Essential tremor:  Brain MRI with generalized atrophy but no etiology to explain her tremor  Tremor is significantly better  Continue with low-dose primidone. Anxiety  Hypothyroidism  Hypertension  PTSD    There is no other additional neurology recommendations at this time. If questions arise, please not hesitate to contact me and I will return to see the patient. The patient should follow-up in clinic in approximately 4 weeks time after discharge. Subjective: my tremor is better       History of Present Illness:   Christina Foster is a 82 y.o. female with a history of hypothyroidism, peripheral neuropathy who presented to the hospital with bilateral lower extremity weakness and inability to ambulate. The patient typically walks with a rollator however this has become more difficult over the past couple days. She denies any incontinence.   The patient states that she does have chronic pain in her

## 2023-09-18 NOTE — PLAN OF CARE
Problem: Occupational Therapy - Adult  Goal: By Discharge: Performs self-care activities at highest level of function for planned discharge setting. See evaluation for individualized goals. Description: FUNCTIONAL STATUS PRIOR TO ADMISSION:  Patient was ambulatory using Rollator; however, reports she has had recent decline, with reported in-home falls and inability to get into tub/shower, bathing sink side. Receives Help From: Family, ADL Assistance:  (Reports she bathes at sinkside), Homemaking Assistance: Independent, Ambulation Assistance: Needs assistance,  , Active : No     HOME SUPPORT: Patient lived , who recently returned home from rehab (pt reports he had CVA), with  unable to provide pt with ADL assist.    Occupational Therapy Goals:  Initiated 9/18/2023  1. Patient will perform RW grooming with Supervision within 7 day(s). 2.  Patient will perform seated bathing with Supervision within 7 day(s). 3.  Patient will perform RW lower body dressing with Contact Guard Assist within 7 day(s). 4.  Patient will perform RW toilet transfers with Contact Guard Assist  within 7 day(s). 5.  Patient will perform all aspects of RW toileting with Contact Guard Assist within 7 day(s). Outcome: Progressing    OCCUPATIONAL THERAPY EVALUATION    Patient: Tio Weathers (82 y.o. female)  Date: 9/18/2023  Primary Diagnosis: Weakness generalized [R53.1]  Unable to ambulate [R26.2]  Bilateral leg weakness [R29.898]         Precautions: Fall Risk                  ASSESSMENT :  The patient is limited by decreased strength/endurance, decreased mobility/balance, decreased activity tolerance, decreased full body reaching, fall risk and full body tremors (reports she recently starting taking medicine to assist), all of which limit pt's ability to complete self-care routine at level congruent with PLOF.   Currently, pt is Mod A for LE ADLs and ADL related transfers, with pt noted to use lateral flexion to

## 2023-09-18 NOTE — PROGRESS NOTES
Physical Therapy     0900 Chart reviewed up to date. Noted pt with cervical and thoracic MRI completed however no clear plan/recommendations post MRI from neurosurgery. Will defer PT eval this AM and follow up in PM as able. 1030 Per IDR, pt cleared for PT session by MD. Will follow up for eval as able.     Rosana Joy PT, DPT, NCS

## 2023-09-18 NOTE — PROGRESS NOTES
End of Shift Note     Bedside shift change report given to Christopher Owen RN (oncoming nurse) by Sarah Amaya (offgoing nurse).   Report included the following information Nurse Handoff Report        Shift worked:  Nights   Shift summary and any significant changes:     No significant changes during the night     Concerns for physician to address:  None   Zone phone for oncoming shift:   [phone]      Patient Information  Cass Weathers  82 y.o.  9/15/2023 10:44 AM by Debbie Rogers MD. Cass Weathers was admitted from Home     Problem List       Patient Active Problem List     Diagnosis Date Noted    Weakness generalized 09/15/2023    Easy bruising 08/31/2017    History of rheumatic fever 08/31/2017    Age-related osteoporosis without current pathological fracture 08/31/2017    Personal history of DVT (deep vein thrombosis) 12/21/2016    History of shingles 12/21/2016    Irritable bowel syndrome without diarrhea 03/17/2016    Hypothyroidism 12/08/2014    Spinal stenosis, lumbar 12/08/2014    Hyperlipidemia 12/08/2014    Degenerative disc disease, lumbar 12/08/2014      Past Medical History        Past Medical History:   Diagnosis Date    Adverse effect of anesthesia       decreased BP with nitrous oxide    Chronic kidney disease       kidney stones    Chronic pain       neuropathy - burning feeling    Degenerative disc disease      Hypothyroidism      Ill-defined condition       genital warts    Ill-defined condition       neuropathy in torso    Ill-defined condition       osteopenia    Ill-defined condition       stigmatism right eye    Ill-defined condition       IBS    Ill-defined condition       phlebitis    Ill-defined condition       hx chickenpox/shingles    Ill-defined condition       hx double pneumonia    Ill-defined condition       \"stomach moved over after fall\" per pt per MD    Ill-defined condition       as child chronic tonsillitis/strep/gets infections easily per pt    Lumbar stenosis      Rheumatic fever Thromboembolus ([address])       R leg    Tremor       hand/fingers            Core Measures:  CVA: Yes Yes  CHF:No No  PNA:NoNo     Activity:  Number times ambulated in hallways past shift: 0  Number of times OOB to chair past shift: 0     Cardiac:   Cardiac Monitoring: Yes         Access:   Current line(s): PIV  Central Line? No Placement date  Reason Medically Necessary   PICC LINE? No Placement date Reason Medically Necessary      Genitourinary:   Urinary status: voiding   Urinary Catheter? No      Respiratory:   O2 Device: None (Room air)  Chronic home O2 use?: no  Incentive spirometer at bedside: no     GI:  Current diet:  ADULT DIET; Regular  Passing flatus: yes  Tolerating current diet: yes     Pain Management:   Patient states pain is manageable on current regimen: yes     Skin:  Sathish Scale Score: 17  Interventions: increase time out of bed, PT/OT consult, internal/external urinary devices, and nutritional support    Patient Safety:  Fall Score:  Zamudio Total Score: 85  Interventions: bed/chair alarm, assistive devices (walker, cane, etc.), gripper socks, pt to call before getting OOB, and stay with me (per policy)  @Rollbelt  @dexterity to release roll belt  Yes/No ( must document dexterity  here by stating Yes or No here, otherwise this is a restraint and must follow restraint documentation policy.)     DVT prophylaxis:  DVT prophylaxis Med- yes  DVT prophylaxis SCD or NIKA- no      Wounds: (If Applicable)  Wounds- no  Location      Active Consults:  IP CONSULT TO ORTHOPEDIC SURGERY  IP CONSULT TO NEUROLOGY     Length of Stay:  Expected LOS: 3  Actual LOS: 0  Discharge Plan: no TBD

## 2023-09-18 NOTE — PLAN OF CARE
Problem: Physical Therapy - Adult  Goal: By Discharge: Performs mobility at highest level of function for planned discharge setting. See evaluation for individualized goals. Description: FUNCTIONAL STATUS PRIOR TO ADMISSION: Pt reports mod I with rollator PTA. Has had functional decline over the last 3 years, now unable to negotiate stairs in her home. HOME SUPPORT PRIOR TO ADMISSION: Lives with  in 2 story home, 7 IVY with no rails. Unable to go to 2nd floor where  sleeps. Per pt,  just had IPR stay for acute CVA. Physical Therapy Goals  Initiated 9/18/2023  1. Patient will move from supine to sit and sit to supine, scoot up and down, and roll side to side in bed with modified independence within 7 day(s). 2.  Patient will perform sit to stand with Minimal assist within 7 day(s). 3.  Patient will transfer from bed to chair and chair to bed with minimal assistance using the least restrictive device within 7 day(s). 4.  Patient will ambulate with minimal assistance for 25 feet with the least restrictive device within 7 day(s). Outcome: Progressing   PHYSICAL THERAPY EVALUATION    Patient: Dinorah Weathers (82 y.o. female)  Date: 9/18/2023  Primary Diagnosis: Weakness generalized [R53.1]  Unable to ambulate [R26.2]  Bilateral leg weakness [R29.898]       Precautions: Fall Risk, bed/chair alarm      ASSESSMENT :   DEFICITS/IMPAIRMENTS:   The patient is limited by decreased functional mobility, strength, activity tolerance, safety awareness, balance     Based on the impairments listed above pt was received supine in bed, agreeable to PT session. Pt reports increased weakness and mobility decline requiring ED admission 2/2 not able to walk. MRI negative for acute CVA or cord compression per chart. Pt performed bed mobility with extra time, use of rail, and overall Supervision.  Sit <> stands performed EOB with RW and mod A, very unsafe and notable B LE wobble/small buckling that

## 2023-09-18 NOTE — PROGRESS NOTES
End of Shift Note    Bedside shift change report given to [place] LPN (oncoming nurse) by Debbie Howard, RN (offgoing nurse). Report included the following information Nurse Handoff Report      Shift worked:  Days    Shift summary and any significant changes:     No significant changes. Patient was up tolerating meals throughout the day. Patient turned Q2, venelex applied, heelz up.          Concerns for physician to address:  None   Zone phone for oncoming shift:   [phone]     Patient Information  Kurt Weathers  82 y.o.  9/15/2023 10:44 AM by Yanet Arroyo MD. Kurt Weathers was admitted from Home    Problem List  Patient Active Problem List    Diagnosis Date Noted    Unable to ambulate 09/16/2023    Essential tremor 09/16/2023    Neuropathy 09/16/2023    Lumbar degenerative disc disease 09/16/2023    Bilateral leg weakness 09/16/2023    Weakness generalized 09/15/2023    Easy bruising 08/31/2017    History of rheumatic fever 08/31/2017    Age-related osteoporosis without current pathological fracture 08/31/2017    Personal history of DVT (deep vein thrombosis) 12/21/2016    History of shingles 12/21/2016    Irritable bowel syndrome without diarrhea 03/17/2016    Hypothyroidism 12/08/2014    Spinal stenosis, lumbar 12/08/2014    Hyperlipidemia 12/08/2014    Degenerative disc disease, lumbar 12/08/2014     Past Medical History:   Diagnosis Date    Adverse effect of anesthesia     decreased BP with nitrous oxide    Chronic kidney disease     kidney stones    Chronic pain     neuropathy - burning feeling    Degenerative disc disease     Hypothyroidism     Ill-defined condition     genital warts    Ill-defined condition     neuropathy in torso    Ill-defined condition     osteopenia    Ill-defined condition     stigmatism right eye    Ill-defined condition     IBS    Ill-defined condition     phlebitis    Ill-defined condition     hx chickenpox/shingles    Ill-defined condition     hx double pneumonia    Ill-defined

## 2023-09-19 PROCEDURE — 6370000000 HC RX 637 (ALT 250 FOR IP): Performed by: PSYCHIATRY & NEUROLOGY

## 2023-09-19 PROCEDURE — 96372 THER/PROPH/DIAG INJ SC/IM: CPT

## 2023-09-19 PROCEDURE — 6360000002 HC RX W HCPCS: Performed by: INTERNAL MEDICINE

## 2023-09-19 PROCEDURE — 97530 THERAPEUTIC ACTIVITIES: CPT

## 2023-09-19 PROCEDURE — 6370000000 HC RX 637 (ALT 250 FOR IP): Performed by: INTERNAL MEDICINE

## 2023-09-19 PROCEDURE — G0378 HOSPITAL OBSERVATION PER HR: HCPCS

## 2023-09-19 PROCEDURE — 97116 GAIT TRAINING THERAPY: CPT

## 2023-09-19 PROCEDURE — 2580000003 HC RX 258: Performed by: INTERNAL MEDICINE

## 2023-09-19 PROCEDURE — 6370000000 HC RX 637 (ALT 250 FOR IP): Performed by: STUDENT IN AN ORGANIZED HEALTH CARE EDUCATION/TRAINING PROGRAM

## 2023-09-19 PROCEDURE — 97535 SELF CARE MNGMENT TRAINING: CPT | Performed by: OCCUPATIONAL THERAPIST

## 2023-09-19 RX ADMIN — Medication: at 08:57

## 2023-09-19 RX ADMIN — Medication: at 20:59

## 2023-09-19 RX ADMIN — ATORVASTATIN CALCIUM 10 MG: 10 TABLET, FILM COATED ORAL at 08:57

## 2023-09-19 RX ADMIN — AMLODIPINE BESYLATE 5 MG: 5 TABLET ORAL at 08:57

## 2023-09-19 RX ADMIN — PRIMIDONE 50 MG: 50 TABLET ORAL at 20:59

## 2023-09-19 RX ADMIN — PREGABALIN 75 MG: 75 CAPSULE ORAL at 14:10

## 2023-09-19 RX ADMIN — ENOXAPARIN SODIUM 40 MG: 100 INJECTION SUBCUTANEOUS at 08:57

## 2023-09-19 RX ADMIN — LEVOTHYROXINE SODIUM 50 MCG: 0.05 TABLET ORAL at 05:53

## 2023-09-19 RX ADMIN — SODIUM CHLORIDE, PRESERVATIVE FREE 10 ML: 5 INJECTION INTRAVENOUS at 20:59

## 2023-09-19 RX ADMIN — PRIMIDONE 50 MG: 50 TABLET ORAL at 15:53

## 2023-09-19 RX ADMIN — PRIMIDONE 50 MG: 50 TABLET ORAL at 05:53

## 2023-09-19 RX ADMIN — PREGABALIN 75 MG: 75 CAPSULE ORAL at 20:59

## 2023-09-19 RX ADMIN — SODIUM CHLORIDE, PRESERVATIVE FREE 10 ML: 5 INJECTION INTRAVENOUS at 08:57

## 2023-09-19 RX ADMIN — PREGABALIN 75 MG: 75 CAPSULE ORAL at 08:57

## 2023-09-19 ASSESSMENT — PAIN SCALES - GENERAL
PAINLEVEL_OUTOF10: 0
PAINLEVEL_OUTOF10: 0

## 2023-09-19 NOTE — PROGRESS NOTES
Signed        Expand All Collapse All                                                                                                                                                                                                                                                                                            End of Shift Note     Bedside shift change report given to Somalia LPN (oncoming nurse) by Leanne Nayak RN (offgoing nurse). Report included the following information Nurse Handoff Report        Shift worked:  Days    Shift summary and any significant changes:      No significant changes. Patient was up tolerating meals throughout the day. Patient turned Q2, venelex applied, heelz up. Had bowel movement today. Was up to chair.           Concerns for physician to address:  None   Zone phone for oncoming shift:   [phone]      Patient Information  Chase Brought Core  82 y.o.  9/15/2023 10:44 AM by Dariela Tate MD. Chase Brought Core was admitted from Home     Problem List       Patient Active Problem List     Diagnosis Date Noted    Unable to ambulate 09/16/2023    Essential tremor 09/16/2023    Neuropathy 09/16/2023    Lumbar degenerative disc disease 09/16/2023    Bilateral leg weakness 09/16/2023    Weakness generalized 09/15/2023    Easy bruising 08/31/2017    History of rheumatic fever 08/31/2017    Age-related osteoporosis without current pathological fracture 08/31/2017    Personal history of DVT (deep vein thrombosis) 12/21/2016    History of shingles 12/21/2016    Irritable bowel syndrome without diarrhea 03/17/2016    Hypothyroidism 12/08/2014    Spinal stenosis, lumbar 12/08/2014    Hyperlipidemia 12/08/2014    Degenerative disc disease, lumbar 12/08/2014      Past Medical History        Past Medical History:   Diagnosis Date    Adverse effect of anesthesia       decreased BP with nitrous oxide    Chronic kidney disease       kidney stones    Chronic pain       neuropathy - burning feeling

## 2023-09-19 NOTE — PLAN OF CARE
Problem: Discharge Planning  Goal: Discharge to home or other facility with appropriate resources  Outcome: Progressing     Problem: Pain  Goal: Verbalizes/displays adequate comfort level or baseline comfort level  Outcome: Progressing     Problem: Skin/Tissue Integrity  Goal: Absence of new skin breakdown  Description: 1. Monitor for areas of redness and/or skin breakdown  2. Assess vascular access sites hourly  3. Every 4-6 hours minimum:  Change oxygen saturation probe site  4. Every 4-6 hours:  If on nasal continuous positive airway pressure, respiratory therapy assess nares and determine need for appliance change or resting period. Outcome: Progressing     Problem: ABCDS Injury Assessment  Goal: Absence of physical injury  Outcome: Progressing     Problem: Safety - Adult  Goal: Free from fall injury  Outcome: Progressing     Problem: Occupational Therapy - Adult  Goal: By Discharge: Performs self-care activities at highest level of function for planned discharge setting. See evaluation for individualized goals. Description: FUNCTIONAL STATUS PRIOR TO ADMISSION:  Patient was ambulatory using Rollator; however, reports she has had recent decline, with reported in-home falls and inability to get into tub/shower, bathing sink side. Receives Help From: Family, ADL Assistance:  (Reports she bathes at sinkside), Homemaking Assistance: Independent, Ambulation Assistance: Needs assistance,  , Active : No     HOME SUPPORT: Patient lived , who recently returned home from rehab (pt reports he had CVA), with  unable to provide pt with ADL assist.    Occupational Therapy Goals:  Initiated 9/18/2023  1. Patient will perform RW grooming with Supervision within 7 day(s). 2.  Patient will perform seated bathing with Supervision within 7 day(s). 3.  Patient will perform RW lower body dressing with Contact Guard Assist within 7 day(s).   4.  Patient will perform RW toilet transfers with Contact Guard Assist  within 7 day(s). 5.  Patient will perform all aspects of RW toileting with Contact Guard Assist within 7 day(s). 9/18/2023 1232 by Michael Espinoza OT  Outcome: Progressing     Problem: Physical Therapy - Adult  Goal: By Discharge: Performs mobility at highest level of function for planned discharge setting. See evaluation for individualized goals. Description: FUNCTIONAL STATUS PRIOR TO ADMISSION: Pt reports mod I with rollator PTA. Has had functional decline over the last 3 years, now unable to negotiate stairs in her home. HOME SUPPORT PRIOR TO ADMISSION: Lives with  in 2 story home, 7 IVY with no rails. Unable to go to 2nd floor where  sleeps. Per pt,  just had IPR stay for acute CVA. Physical Therapy Goals  Initiated 9/18/2023  1. Patient will move from supine to sit and sit to supine, scoot up and down, and roll side to side in bed with modified independence within 7 day(s). 2.  Patient will perform sit to stand with Minimal assist within 7 day(s). 3.  Patient will transfer from bed to chair and chair to bed with minimal assistance using the least restrictive device within 7 day(s). 4.  Patient will ambulate with minimal assistance for 25 feet with the least restrictive device within 7 day(s).      9/18/2023 1256 by Rosy Choudhary PT  Outcome: Progressing

## 2023-09-19 NOTE — PROGRESS NOTES
End of Shift Note     Bedside shift change report given to Britney Christensen RN (oncoming nurse) by Liza Samuel (offgoing nurse).   Report included the following information Nurse Handoff Report        Shift worked:  Nights   Shift summary and any significant changes:     No significant changes during the night     Concerns for physician to address:  None   Zone phone for oncoming shift:   [phone]      Patient Information  Daija Aguirre Core  82 y.o.  9/15/2023 10:44 AM by Tiffany Meadows MD. Daija Em Core was admitted from Home     Problem List       Patient Active Problem List     Diagnosis Date Noted    Weakness generalized 09/15/2023    Easy bruising 08/31/2017    History of rheumatic fever 08/31/2017    Age-related osteoporosis without current pathological fracture 08/31/2017    Personal history of DVT (deep vein thrombosis) 12/21/2016    History of shingles 12/21/2016    Irritable bowel syndrome without diarrhea 03/17/2016    Hypothyroidism 12/08/2014    Spinal stenosis, lumbar 12/08/2014    Hyperlipidemia 12/08/2014    Degenerative disc disease, lumbar 12/08/2014      Past Medical History        Past Medical History:   Diagnosis Date    Adverse effect of anesthesia       decreased BP with nitrous oxide    Chronic kidney disease       kidney stones    Chronic pain       neuropathy - burning feeling    Degenerative disc disease      Hypothyroidism      Ill-defined condition       genital warts    Ill-defined condition       neuropathy in torso    Ill-defined condition       osteopenia    Ill-defined condition       stigmatism right eye    Ill-defined condition       IBS    Ill-defined condition       phlebitis    Ill-defined condition       hx chickenpox/shingles    Ill-defined condition       hx double pneumonia    Ill-defined condition       \"stomach moved over after fall\" per pt per MD    Ill-defined condition       as child chronic tonsillitis/strep/gets infections easily per pt    Lumbar stenosis      Rheumatic fever Thromboembolus ([address])       R leg    Tremor       hand/fingers            Core Measures:  CVA: Yes Yes  CHF:No No  PNA:NoNo     Activity:  Number times ambulated in hallways past shift: 0  Number of times OOB to chair past shift: 0     Cardiac:   Cardiac Monitoring: Yes         Access:   Current line(s): PIV  Central Line? No Placement date  Reason Medically Necessary   PICC LINE? No Placement date Reason Medically Necessary      Genitourinary:   Urinary status: voiding   Urinary Catheter? No      Respiratory:   O2 Device: None (Room air)  Chronic home O2 use?: no  Incentive spirometer at bedside: no     GI:  Current diet:  ADULT DIET; Regular  Passing flatus: yes  Tolerating current diet: yes     Pain Management:   Patient states pain is manageable on current regimen: yes     Skin:  Sathish Scale Score: 17  Interventions: increase time out of bed, PT/OT consult, internal/external urinary devices, and nutritional support    Patient Safety:  Fall Score:  Zamudio Total Score: 85  Interventions: bed/chair alarm, assistive devices (walker, cane, etc.), gripper socks, pt to call before getting OOB, and stay with me (per policy)  @Rollbelt  @dexterity to release roll belt  Yes/No ( must document dexterity  here by stating Yes or No here, otherwise this is a restraint and must follow restraint documentation policy.)     DVT prophylaxis:  DVT prophylaxis Med- yes  DVT prophylaxis SCD or NIKA- no      Wounds: (If Applicable)  Wounds- no  Location      Active Consults:  IP CONSULT TO ORTHOPEDIC SURGERY  IP CONSULT TO NEUROLOGY     Length of Stay:  Expected LOS: 3  Actual LOS: 0  Discharge Plan: no TBD

## 2023-09-19 NOTE — CARE COORDINATION
Transition of Care Plan:     RUR:  OBS  Prior Level of Functioning: Independent  Disposition: Plan for Acute Rehab: [facility]  Auth Pendin/19     3:54 PM  CM completed chart review. Both [facility] and [facility] both accepted  CM offered FOC to Pt and Spouse over the phone and the preference is for [facility]. CM talked to Jhony Wiley and she will have [facility] team start auth today. Jhony Wiley stated that they should have a bed Thursday or Friday. Insurance is [employer] and will require auth. - Pt stated that they are over income for Medicaid. He is going to look into private duty caregiver for them if needed in the future. Pt spouse stated he is working with his insurance and [employer] to try to keep them at home for as long as possible. Pt has a friend that can bring more clothes to her at rehab. Spouse can not drive for 6 months. Spouse indicated that [employer] [facility] on Aging has already screened them for Medicaid and they are over income. CM talked to them about possibly hiring private duty CG or looking at [facility] in the future when she comes home if needed. If SNF or IPR: Date FOC offered:   Date FOC received:   Accepting facility:   Date authorization started with reference number:   Date authorization received and expires: Follow up appointments: after rehab. DME needed: tbd  Transportation at discharge: BLS transport will need to be arranged. IM/IMM Medicare/ letter given: 1st IM letter   Is patient a  and connected with VA? no              If yes, was [employer] transfer form completed and VA notified? Caregiver Contact: Fredmargaret Dyer Core: [phone]  Discharge Caregiver contacted prior to discharge? Yes via phone  Care Conference needed? no  Barriers to discharge: placement. Auth. CM will continue to monitor discharge plan.       Sherwin Quigley, [facility]  Ext [phone]

## 2023-09-20 PROCEDURE — 97535 SELF CARE MNGMENT TRAINING: CPT | Performed by: OCCUPATIONAL THERAPIST

## 2023-09-20 PROCEDURE — 97116 GAIT TRAINING THERAPY: CPT

## 2023-09-20 PROCEDURE — 97530 THERAPEUTIC ACTIVITIES: CPT

## 2023-09-20 PROCEDURE — 6370000000 HC RX 637 (ALT 250 FOR IP): Performed by: PSYCHIATRY & NEUROLOGY

## 2023-09-20 PROCEDURE — 96372 THER/PROPH/DIAG INJ SC/IM: CPT

## 2023-09-20 PROCEDURE — 6360000002 HC RX W HCPCS: Performed by: INTERNAL MEDICINE

## 2023-09-20 PROCEDURE — G0378 HOSPITAL OBSERVATION PER HR: HCPCS

## 2023-09-20 PROCEDURE — 2580000003 HC RX 258: Performed by: INTERNAL MEDICINE

## 2023-09-20 PROCEDURE — 6370000000 HC RX 637 (ALT 250 FOR IP): Performed by: STUDENT IN AN ORGANIZED HEALTH CARE EDUCATION/TRAINING PROGRAM

## 2023-09-20 PROCEDURE — 6370000000 HC RX 637 (ALT 250 FOR IP): Performed by: INTERNAL MEDICINE

## 2023-09-20 RX ORDER — AMLODIPINE BESYLATE 5 MG/1
5 TABLET ORAL DAILY
Qty: 30 TABLET | Refills: 3 | Status: SHIPPED
Start: 2023-09-21 | End: 2023-09-21 | Stop reason: HOSPADM

## 2023-09-20 RX ORDER — PRIMIDONE 50 MG/1
50 TABLET ORAL EVERY 8 HOURS SCHEDULED
Qty: 90 TABLET | Refills: 3 | Status: SHIPPED
Start: 2023-09-20

## 2023-09-20 RX ADMIN — PREGABALIN 75 MG: 75 CAPSULE ORAL at 15:06

## 2023-09-20 RX ADMIN — PRIMIDONE 50 MG: 50 TABLET ORAL at 05:52

## 2023-09-20 RX ADMIN — LEVOTHYROXINE SODIUM 50 MCG: 0.05 TABLET ORAL at 05:52

## 2023-09-20 RX ADMIN — SODIUM CHLORIDE, PRESERVATIVE FREE 10 ML: 5 INJECTION INTRAVENOUS at 09:24

## 2023-09-20 RX ADMIN — ATORVASTATIN CALCIUM 10 MG: 10 TABLET, FILM COATED ORAL at 09:24

## 2023-09-20 RX ADMIN — AMLODIPINE BESYLATE 5 MG: 5 TABLET ORAL at 09:24

## 2023-09-20 RX ADMIN — PRIMIDONE 50 MG: 50 TABLET ORAL at 22:13

## 2023-09-20 RX ADMIN — PREGABALIN 75 MG: 75 CAPSULE ORAL at 09:23

## 2023-09-20 RX ADMIN — PREGABALIN 75 MG: 75 CAPSULE ORAL at 20:40

## 2023-09-20 RX ADMIN — PRIMIDONE 50 MG: 50 TABLET ORAL at 17:35

## 2023-09-20 RX ADMIN — SODIUM CHLORIDE, PRESERVATIVE FREE 10 ML: 5 INJECTION INTRAVENOUS at 20:40

## 2023-09-20 RX ADMIN — Medication: at 09:20

## 2023-09-20 RX ADMIN — ENOXAPARIN SODIUM 40 MG: 100 INJECTION SUBCUTANEOUS at 11:35

## 2023-09-20 RX ADMIN — Medication: at 20:40

## 2023-09-20 NOTE — PLAN OF CARE
Problem: Physical Therapy - Adult  Goal: By Discharge: Performs mobility at highest level of function for planned discharge setting. See evaluation for individualized goals. Description: FUNCTIONAL STATUS PRIOR TO ADMISSION: Pt reports mod I with rollator PTA. Has had functional decline over the last 3 years, now unable to negotiate stairs in her home. HOME SUPPORT PRIOR TO ADMISSION: Lives with  in 2 story home, 7 IVY with no rails. Unable to go to 2nd floor where  sleeps. Per pt,  just had IPR stay for acute CVA. Physical Therapy Goals  Initiated 9/18/2023  1. Patient will move from supine to sit and sit to supine, scoot up and down, and roll side to side in bed with modified independence within 7 day(s). 2.  Patient will perform sit to stand with Minimal assist within 7 day(s). 3.  Patient will transfer from bed to chair and chair to bed with minimal assistance using the least restrictive device within 7 day(s). 4.  Patient will ambulate with minimal assistance for 25 feet with the least restrictive device within 7 day(s). Outcome: Progressing   PHYSICAL THERAPY TREATMENT    Patient: Tiana Julien (82 y.o. female)  Date: 9/20/2023  Diagnosis: Weakness generalized [R53.1]  Unable to ambulate [R26.2]  Bilateral leg weakness [R29.898] Weakness generalized      Precautions: Fall Risk                    ASSESSMENT:  Patient continues to benefit from skilled PT services and is progressing towards goals. She is improving to overall min A for amb but still lacks significant L hip strength resulting in significant trendelenburg and RLE knee strength with old injury and resulting in amb with R knee partially flexed and IR. Pt uses walker for amb and needs cues for safety and technique. Pt needs increased assist for sit to stand from lower surfaces and toilet. Pt showing some decreased UE tremor but a mild overall tremor this session.  She is motivated to improve and receives therapy Assistance: Minimum assistance  Interventions: Manual cues; Safety awareness training;Verbal cues  Speed/Ekta: Pace decreased (< 100 feet/min)  Step Length: Left shortened;Right shortened  Swing Pattern: Right asymmetrical  Stance: Right decreased  Gait Abnormalities: Decreased step clearance;Trendelenburg;Trunk sway increased (L hip abductors weak, when given support, gait pattern improves)  Distance (ft): 20 Feet (seated rest then 30')  Assistive Device: Gait belt;Walker, rolling      Pain Rating:  No c/o    Activity Tolerance:   Good    After treatment:   Patient left in no apparent distress sitting up in chair, Call bell within reach, and Bed/ chair alarm activated      COMMUNICATION/EDUCATION:   The patient's plan of care was discussed with: occupational therapist and registered nurse    Patient Education  Education Given To: Patient  Education Provided: Role of Therapy;Plan of Care;Transfer Training; Fall Prevention Strategies  Education Provided Comments: gait  Education Method: Demonstration;Verbal;Teach Back  Barriers to Learning: Cognition (sometimes distractible but improving)  Education Outcome: Verbalized understanding;Continued education needed      Darryl Haque PT  Minutes: 25

## 2023-09-20 NOTE — CARE COORDINATION
Transition of Care Plan:     RUR:  OBS  Prior Level of Functioning: Independent    Disposition: SNF: [address] [place] [address] [facility] approved  RN will call report to   Room Number  Transport ETA: ____    6 PM   Received email from [address] has been approved for 3 days to [address] [place] [address]. Auth#: [id number]  Service Dates: 9/20/23 - 9/22/23   CM Coordinator: Raleigh Councilman CM let Pt and Spouse know approved. CM will need to check with SNF to see when bed is ready and room number. CM will need to set up transport. Friend is going to bring clothes to SNF    1 PM   [facility] accepted. CM emailed CM Specialist to start auth with [address] started 9/20/23     11:53 AM  CM completed chart review. Noted DC order. CM heard from [facility] and request for acute rehab was denied. Pt and spouse want to  pursue SNF. [address] [place] [address] [facility] would be preference. Referral sent to [address] [place] [address] via [employer] will need to be secured. -CM called [facility]: [phone] and they are reviewing referral.     - Pt stated that they are over income for Medicaid. He is going to look into private duty caregiver for them if needed in the future. Pt spouse stated he is working with his insurance and [employer] to try to keep them at home for as long as possible. Pt has a friend that can bring more clothes to her at rehab. Spouse can not drive for 6 months. Spouse indicated that [facility] Agency on Aging has already screened them for Medicaid and they are over income. CM talked to them about possibly hiring private duty CG or looking at detention in the future when she comes home if needed. If SNF or IPR: Date FOC offered: 9/18  Date [address] received: 9/18  Accepting facility: [facility]  Date authorization started with reference number: 9/19  Date authorization received and expires: Follow up appointments: after rehab.    DME needed:

## 2023-09-20 NOTE — PLAN OF CARE
Problem: Discharge Planning  Goal: Discharge to home or other facility with appropriate resources  Outcome: Progressing     Problem: Pain  Goal: Verbalizes/displays adequate comfort level or baseline comfort level  Outcome: Progressing     Problem: Skin/Tissue Integrity  Goal: Absence of new skin breakdown  Description: 1. Monitor for areas of redness and/or skin breakdown  2. Assess vascular access sites hourly  3. Every 4-6 hours minimum:  Change oxygen saturation probe site  4. Every 4-6 hours:  If on nasal continuous positive airway pressure, respiratory therapy assess nares and determine need for appliance change or resting period. Outcome: Progressing     Problem: ABCDS Injury Assessment  Goal: Absence of physical injury  Outcome: Progressing     Problem: Safety - Adult  Goal: Free from fall injury  Outcome: Progressing     Problem: Occupational Therapy - Adult  Goal: By Discharge: Performs self-care activities at highest level of function for planned discharge setting. See evaluation for individualized goals. Description: FUNCTIONAL STATUS PRIOR TO ADMISSION:  Patient was ambulatory using Rollator; however, reports she has had recent decline, with reported in-home falls and inability to get into tub/shower, bathing sink side. Receives Help From: Family, ADL Assistance:  (Reports she bathes at sinkside), Homemaking Assistance: Independent, Ambulation Assistance: Needs assistance,  , Active : No     HOME SUPPORT: Patient lived , who recently returned home from rehab (pt reports he had CVA), with  unable to provide pt with ADL assist.    Occupational Therapy Goals:  Initiated 9/18/2023  1. Patient will perform RW grooming with Supervision within 7 day(s). 2.  Patient will perform seated bathing with Supervision within 7 day(s). 3.  Patient will perform RW lower body dressing with Contact Guard Assist within 7 day(s).   4.  Patient will perform RW toilet transfers with Contact

## 2023-09-20 NOTE — DISCHARGE INSTRUCTIONS
HOSPITALIST DISCHARGE INSTRUCTIONS    NAME: Thea Weathers   :  1940   MRN:  [id number]     Date/Time:  2023 11:40 AM    ADMIT DATE: 9/15/2023   DISCHARGE DATE: 2023     Attending Physician: Charlotte Grey MD    DISCHARGE DIAGNOSIS:  Bilateral lower extremity weakness  Likely d/t peripheral neuropathy  Lower back pain      Follow up with your PMD.      Medications: Per above medication reconciliation. Pain Management: per above medications    Recommended diet: cardiac diet    Recommended activity: activity as tolerated    Wound care: None    Indwelling devices:  None    Supplemental Oxygen: None    Required Lab work: Per SNF routine    Glucose management:  None    Code status: Full        Outside physician follow up: Follow up with neurology for further work up         Skilled nursing facility/ SNF MD responsible for above on discharge. Information obtained by :  I understand that if any problems occur once I am at home I am to contact my physician. I understand and acknowledge receipt of the instructions indicated above.                                                                                                                                            Physician's or R.N.'s Signature                                                                  Date/Time                                                                                                                                              Patient or Repres

## 2023-09-20 NOTE — PROGRESS NOTES
End of Shift Note     Bedside shift change report given to VINI Morillo (oncoming nurse) by Quinton Bailey, ELDA (offgoing nurse). Report included the following information Nurse Handoff Report        Shift worked:  [phone]   Shift summary and any significant changes:     No significant changes during the shift. Probable Discharge tomorrow.      Concerns for physician to address:  None   Zone phone for oncoming shift:   [phone]      Patient Information  Naomi Weathers  82 y.o.  9/15/2023 10:44 AM by Orlando Cedillo MD. Naomi Weathers was admitted from Home     Problem List       Patient Active Problem List     Diagnosis Date Noted    Weakness generalized 09/15/2023    Easy bruising 08/31/2017    History of rheumatic fever 08/31/2017    Age-related osteoporosis without current pathological fracture 08/31/2017    Personal history of DVT (deep vein thrombosis) 12/21/2016    History of shingles 12/21/2016    Irritable bowel syndrome without diarrhea 03/17/2016    Hypothyroidism 12/08/2014    Spinal stenosis, lumbar 12/08/2014    Hyperlipidemia 12/08/2014    Degenerative disc disease, lumbar 12/08/2014      Past Medical History        Past Medical History:   Diagnosis Date    Adverse effect of anesthesia       decreased BP with nitrous oxide    Chronic kidney disease       kidney stones    Chronic pain       neuropathy - burning feeling    Degenerative disc disease      Hypothyroidism      Ill-defined condition       genital warts    Ill-defined condition       neuropathy in torso    Ill-defined condition       osteopenia    Ill-defined condition       stigmatism right eye    Ill-defined condition       IBS    Ill-defined condition       phlebitis    Ill-defined condition       hx chickenpox/shingles    Ill-defined condition       hx double pneumonia    Ill-defined condition       \"stomach moved over after fall\" per pt per MD    Ill-defined condition       as child chronic tonsillitis/strep/gets infections easily per pt    Lumbar stenosis

## 2023-09-20 NOTE — DISCHARGE SUMMARY
Discharge Summary    Name: Osiel Bryant  [id number]  YOB: 1940 (Age: 82 y.o.)   Date of Admission: 9/15/2023  Date of Discharge: 9/20/2023  Attending Physician: Stella Cockayne, MD    Discharge Diagnosis:  Bilateral lower extremity weakness  Likely due to difficulty neuropathy  Ambulatory dysfunction hypertension  Hypertension  CYNTHIA  Hypothyroidism    Consultations:  IP CONSULT TO NEUROLOGY      Brief Admission History/Reason for Admission Per Stella Cockayne, MD:   Osiel Bryant is a 82 y.o.  female with PMHx significant for hypothyroidism, neuropathy who presented to the ED with complaining of bilateral lower extremity weakness with inability to ambulate. Patient normally walks with a rollator however for the past 2 days, she has not been able to get up from her bed. Patient denies any fecal incontinence but has chronic urinary incontinence. Patient denies any falls  In the ED, patient vital signs showed that she was hypertensive, her lab work showed normal BMP, mildly elevated creatinine. UA is unremarkable  MRI o   1. Grade 1 anterolisthesis of L4 on L5 and L5 on S1.  2. Multilevel spondylosis as above, worst at L4-5.f the lumbar spine showed    Brief Hospital Course by Main Problems:   She was admitted with ambulatory dysfunction and bilateral lower extremity weakness. MRI showed a spondylolysis, anterolisthesis, spinal cord indentation with no cord compression. Was seen by neurology and neurosurgery. Had a NCT done, did not show any large fiber involvement, recommended outpatient prolonged nerve conduction test to check for small fiber involvement. Was restarted on primidone by neurology for tremors. Evaluated by physical therapy was recommending inpatient rehab. Discharged to inpatient rehab set up by . Discharge Exam:  Patient seen and examined by me on discharge day.   Pertinent Findings:  Patient Vitals for the past 24 hrs:   BP Temp Temp

## 2023-09-20 NOTE — PROGRESS NOTES
End of Shift Note     Bedside shift change report given to Judit Tyson (oncoming nurse) by Gaby Torres (offgoing nurse).   Report included the following information Nurse Handoff Report        Shift worked:  Nights   Shift summary and any significant changes:     No significant changes during the night     Concerns for physician to address:  None   Zone phone for oncoming shift:   [phone]      Patient Information  Dinorah Weathers  82 y.o.  9/15/2023 10:44 AM by Estephanie Foster MD. Dinorah Weathers was admitted from Home     Problem List       Patient Active Problem List     Diagnosis Date Noted    Weakness generalized 09/15/2023    Easy bruising 08/31/2017    History of rheumatic fever 08/31/2017    Age-related osteoporosis without current pathological fracture 08/31/2017    Personal history of DVT (deep vein thrombosis) 12/21/2016    History of shingles 12/21/2016    Irritable bowel syndrome without diarrhea 03/17/2016    Hypothyroidism 12/08/2014    Spinal stenosis, lumbar 12/08/2014    Hyperlipidemia 12/08/2014    Degenerative disc disease, lumbar 12/08/2014      Past Medical History        Past Medical History:   Diagnosis Date    Adverse effect of anesthesia       decreased BP with nitrous oxide    Chronic kidney disease       kidney stones    Chronic pain       neuropathy - burning feeling    Degenerative disc disease      Hypothyroidism      Ill-defined condition       genital warts    Ill-defined condition       neuropathy in torso    Ill-defined condition       osteopenia    Ill-defined condition       stigmatism right eye    Ill-defined condition       IBS    Ill-defined condition       phlebitis    Ill-defined condition       hx chickenpox/shingles    Ill-defined condition       hx double pneumonia    Ill-defined condition       \"stomach moved over after fall\" per pt per MD    Ill-defined condition       as child chronic tonsillitis/strep/gets infections easily per pt    Lumbar stenosis      Rheumatic fever Thromboembolus ([address])       R leg    Tremor       hand/fingers            Core Measures:  CVA: Yes Yes  CHF:No No  PNA:NoNo     Activity:  Number times ambulated in hallways past shift: 0  Number of times OOB to chair past shift: 0     Cardiac:   Cardiac Monitoring: Yes         Access:   Current line(s): PIV  Central Line? No Placement date  Reason Medically Necessary   PICC LINE? No Placement date Reason Medically Necessary      Genitourinary:   Urinary status: voiding   Urinary Catheter? No      Respiratory:   O2 Device: None (Room air)  Chronic home O2 use?: no  Incentive spirometer at bedside: no     GI:  Current diet:  ADULT DIET; Regular  Passing flatus: yes  Tolerating current diet: yes     Pain Management:   Patient states pain is manageable on current regimen: yes     Skin:  Sathish Scale Score: 17  Interventions: increase time out of bed, PT/OT consult, internal/external urinary devices, and nutritional support    Patient Safety:  Fall Score:  Zamudio Total Score: 85  Interventions: bed/chair alarm, assistive devices (walker, cane, etc.), gripper socks, pt to call before getting OOB, and stay with me (per policy)  @Rollbelt  @dexterity to release roll belt  Yes/No ( must document dexterity  here by stating Yes or No here, otherwise this is a restraint and must follow restraint documentation policy.)     DVT prophylaxis:  DVT prophylaxis Med- yes  DVT prophylaxis SCD or NIKA- no      Wounds: (If Applicable)  Wounds- no  Location      Active Consults:  IP CONSULT TO ORTHOPEDIC SURGERY  IP CONSULT TO NEUROLOGY     Length of Stay:  Expected LOS: 3  Actual LOS: 0  Discharge Plan: no TBD

## 2023-09-21 VITALS
DIASTOLIC BLOOD PRESSURE: 53 MMHG | RESPIRATION RATE: 18 BRPM | HEART RATE: 63 BPM | BODY MASS INDEX: 24.31 KG/M2 | TEMPERATURE: 98.1 F | SYSTOLIC BLOOD PRESSURE: 109 MMHG | WEIGHT: 142.42 LBS | HEIGHT: 64 IN | OXYGEN SATURATION: 92 %

## 2023-09-21 PROCEDURE — G0378 HOSPITAL OBSERVATION PER HR: HCPCS

## 2023-09-21 PROCEDURE — 6370000000 HC RX 637 (ALT 250 FOR IP): Performed by: INTERNAL MEDICINE

## 2023-09-21 PROCEDURE — 6360000002 HC RX W HCPCS: Performed by: INTERNAL MEDICINE

## 2023-09-21 PROCEDURE — 96372 THER/PROPH/DIAG INJ SC/IM: CPT

## 2023-09-21 PROCEDURE — 6370000000 HC RX 637 (ALT 250 FOR IP): Performed by: PSYCHIATRY & NEUROLOGY

## 2023-09-21 PROCEDURE — 2580000003 HC RX 258: Performed by: INTERNAL MEDICINE

## 2023-09-21 PROCEDURE — 97535 SELF CARE MNGMENT TRAINING: CPT

## 2023-09-21 RX ADMIN — PREGABALIN 75 MG: 75 CAPSULE ORAL at 14:29

## 2023-09-21 RX ADMIN — PREGABALIN 75 MG: 75 CAPSULE ORAL at 09:56

## 2023-09-21 RX ADMIN — LEVOTHYROXINE SODIUM 50 MCG: 0.05 TABLET ORAL at 06:10

## 2023-09-21 RX ADMIN — Medication: at 11:09

## 2023-09-21 RX ADMIN — ACETAMINOPHEN 650 MG: 325 TABLET ORAL at 03:17

## 2023-09-21 RX ADMIN — ENOXAPARIN SODIUM 40 MG: 100 INJECTION SUBCUTANEOUS at 09:57

## 2023-09-21 RX ADMIN — ATORVASTATIN CALCIUM 10 MG: 10 TABLET, FILM COATED ORAL at 09:57

## 2023-09-21 RX ADMIN — PRIMIDONE 50 MG: 50 TABLET ORAL at 06:10

## 2023-09-21 RX ADMIN — SODIUM CHLORIDE, PRESERVATIVE FREE 10 ML: 5 INJECTION INTRAVENOUS at 09:58

## 2023-09-21 ASSESSMENT — PAIN DESCRIPTION - ORIENTATION
ORIENTATION: LEFT
ORIENTATION: LEFT

## 2023-09-21 ASSESSMENT — PAIN SCALES - GENERAL
PAINLEVEL_OUTOF10: 4
PAINLEVEL_OUTOF10: 2

## 2023-09-21 ASSESSMENT — PAIN DESCRIPTION - DESCRIPTORS
DESCRIPTORS: ACHING
DESCRIPTORS: ACHING

## 2023-09-21 ASSESSMENT — PAIN DESCRIPTION - LOCATION
LOCATION: LEG
LOCATION: LEG;HIP

## 2023-09-21 NOTE — CARE COORDINATION
Transition of Care Plan to SNF/Rehab    Communication to Patient/Family:  Met with patient and family and they are agreeable to the transition plan. The Plan for Transition of Care is related to the following treatment goals : SNF - [address] [place] [address]    The Patient and/or patient representative was provided with a choice of provider and agrees  with the discharge plan. Yes [x] No []    A Freedom of choice list was provided with basic dialogue that supports the patient's individualized plan of care/goals and shares the quality data associated with the providers. Yes [x] No []    SNF/Rehab Transition:  Patient has been accepted to [facility] and meets criteria for admission. Patient will transported by  [facility] and expected to leave at D    Communication to SNF/Rehab:  Bedside RN, Michelle Persaud, has been notified to update the transition plan to the facility and call report (phone number). Discharge information has been updated on the AVS. And communicated to facility via [employer]/All Scripts, or CC link. Nursing Please include all hard scripts for controlled substances, med rec and dc summary, and AVS in packet. Reviewed and confirmed with facility, [address] [place] [address], can manage the patient care needs for the following:     Ying Villasenor with (X) only those applicable:  Medication:  [x]Medications are available at the facility  []IV Antibiotics    []Controlled Substance - hard copies available sent.   []Weekly Labs    Equipment:  []CPAP/BiPAP  []Wound Vacuum  []Ott or Urinary Device  []PICC/Central Line  []Nebulizer  []Ventilator    Treatment:  []Isolation (for MRSA, VRE, etc.)  []Surgical Drain Management  []Tracheostomy Care  []Dressing Changes  []Dialysis with transportation  []PEG Care  []Oxygen  []Daily Weights for Heart Failure    Dietary:  []Any diet limitations  []Tube Feedings   []Total Parenteral Management (TPN)    Financial Resources:  []Medicaid

## 2023-09-21 NOTE — PROGRESS NOTES
End of Shift Note     Bedside shift change report given to Rabia Garcia (oncoming nurse) by Axel Brice (offgoing nurse).   Report included the following information Nurse Handoff Report        Shift worked:  Nights   Shift summary and any significant changes:     No significant changes during the night, PRN Tylenol was giving     Concerns for physician to address:  None   Zone phone for oncoming shift:   [phone]      Patient Information  Claudy Weathers  82 y.o.  9/15/2023 10:44 AM by Addie Ormond, MD. Claudy Weathers was admitted from Home     Problem List       Patient Active Problem List     Diagnosis Date Noted    Weakness generalized 09/15/2023    Easy bruising 08/31/2017    History of rheumatic fever 08/31/2017    Age-related osteoporosis without current pathological fracture 08/31/2017    Personal history of DVT (deep vein thrombosis) 12/21/2016    History of shingles 12/21/2016    Irritable bowel syndrome without diarrhea 03/17/2016    Hypothyroidism 12/08/2014    Spinal stenosis, lumbar 12/08/2014    Hyperlipidemia 12/08/2014    Degenerative disc disease, lumbar 12/08/2014      Past Medical History        Past Medical History:   Diagnosis Date    Adverse effect of anesthesia       decreased BP with nitrous oxide    Chronic kidney disease       kidney stones    Chronic pain       neuropathy - burning feeling    Degenerative disc disease      Hypothyroidism      Ill-defined condition       genital warts    Ill-defined condition       neuropathy in torso    Ill-defined condition       osteopenia    Ill-defined condition       stigmatism right eye    Ill-defined condition       IBS    Ill-defined condition       phlebitis    Ill-defined condition       hx chickenpox/shingles    Ill-defined condition       hx double pneumonia    Ill-defined condition       \"stomach moved over after fall\" per pt per MD    Ill-defined condition       as child chronic tonsillitis/strep/gets infections easily per pt    Lumbar stenosis

## 2023-09-21 NOTE — CARE COORDINATION
Transition of Care Plan:     RUR:  OBS  Prior Level of Functioning: Independent  Disposition: SNF: [address] [place] [address] of [address] approved  RN will call report to   Room Number: 309  Transport ETA: ___  If SNF or IPR: Date FOC offered: 9/18  Date [address] received: 9/18  Accepting facility: [address] [place] [address]  Date authorization started with reference number: 9/19  Date authorization received and expires: Received 9/20  Follow up appointments: PCP after SNF  DME needed: Defer to SNF  Transportation at discharge: S transport to be arranged at 1:00pm  IM/IMM Medicare/ letter given: 1st IM letter 9/16  Is patient a  and connected with VA? no              If yes, was [employer] transfer form completed and VA notified? Caregiver Contact: Jared Mortensen Core: [phone]  Discharge Caregiver contacted prior to discharge? Yes via phone  Care Conference needed? no  Barriers to discharge: Bed availability    Updated Note 11:00    Pt cleared for d/c from CM standpoint    CM spoke with Admissions at [address] [place] [address], they can accept pt today. CM discussed d/c plan with pt at bedside. Pt agreed. CM will update pt on transportation time. [address] [facility]  Phone: [phone]        Initial note    CM placed call to [address] [place] [address], left VM with admissions team requesting return call to discuss bed availability. Auth received for SNF on yesterday 9/20. Care management will continue to be available to assist as transition of care planning needs arise.     Robinson Parker, [address] [facility], [address]  [phone]/Available on [employer]

## 2023-09-21 NOTE — PROGRESS NOTES
Report given to [facility], on patient who is going to [facility] in room 309. Opportunity for questions. Hospital to home to take patient to the facility.

## 2023-09-21 NOTE — PLAN OF CARE
Problem: Occupational Therapy - Adult  Goal: By Discharge: Performs self-care activities at highest level of function for planned discharge setting. See evaluation for individualized goals. Description: FUNCTIONAL STATUS PRIOR TO ADMISSION:  Patient was ambulatory using Rollator; however, reports she has had recent decline, with reported in-home falls and inability to get into tub/shower, bathing sink side. Receives Help From: Family, ADL Assistance:  (Reports she bathes at sinkside), Homemaking Assistance: Independent, Ambulation Assistance: Needs assistance,  , Active : No     HOME SUPPORT: Patient lived , who recently returned home from rehab (pt reports he had CVA), with  unable to provide pt with ADL assist.    Occupational Therapy Goals:  Initiated 9/18/2023  1. Patient will perform RW grooming with Supervision within 7 day(s). 2.  Patient will perform seated bathing with Supervision within 7 day(s). 3.  Patient will perform RW lower body dressing with Contact Guard Assist within 7 day(s). 4.  Patient will perform RW toilet transfers with Contact Guard Assist  within 7 day(s). 5.  Patient will perform all aspects of RW toileting with Contact Guard Assist within 7 day(s). Outcome: Progressing   OCCUPATIONAL THERAPY TREATMENT  Patient: Aurea Thompson (82 y.o. female)  Date: 9/21/2023  Primary Diagnosis: Weakness generalized [R53.1]  Unable to ambulate [R26.2]  Bilateral leg weakness [R29.898]       Precautions: Fall Risk                Chart, occupational therapy assessment, plan of care, and goals were reviewed. ASSESSMENT  Patient continues to benefit from skilled OT services and is progressing towards goals. Patient is received resting in bed, amenable to session. Patient benefits from increased time between transitional movements 2/2 history of vertigo and baseline mild \"floating\" feeling.  Patient able to progress to bathroom-level toileting today, performing seated hygiene Impaired  Standing - Static: Fair;Constant support  Standing - Dynamic: Fair;Constant support    ADL Intervention:  Grooming: Contact guard assistance   Grooming Skilled Clinical Factors: Patient sits on BSC at sink to perform grooming tasks, including hand hygiene and brushing teeth. Patient stabilizes forearms on sink, requiring increased time and care to apply toothpaste to toothbrush given tremors. Patient benefits from CGA when leaning forward to rinse/spit with cup. LE Dressing Skilled Clinical Factors: Patient pulls down brief with mod-max assist. While seated on toilet, patient able to reach forward to thread BLEs through personal pull-ups. Mod assist needed to pull up in standing. Toileting: Moderate assistance  Toileting Skilled Clinical Factors: Patient performs seated hygiene with CGA. Up to mod assist needed for clothing management. Pain Rating:  No reports. Activity Tolerance:   Fair  and requires rest breaks  Please refer to the flowsheet for vital signs taken during this treatment. After treatment:   Patient left in no apparent distress sitting up in chair, Call bell within reach, and Bed/ chair alarm activated    COMMUNICATION/EDUCATION:   The patient's plan of care was discussed with: physical therapist and registered nurse    Patient Education  Education Given To: Patient  Education Provided: Role of Therapy; ADL Adaptive Strategies; Energy Conservation; Fall Prevention Strategies;Transfer Training;Plan of Care  Education Method: Verbal;Demonstration  Barriers to Learning: None  Education Outcome: Verbalized understanding    Thank you for this referral.  Magdalena Avendano OT  Minutes: 47

## 2023-09-29 ENCOUNTER — TELEPHONE (OUTPATIENT)
Age: 83
End: 2023-09-29

## 2023-09-29 NOTE — TELEPHONE ENCOUNTER
[facility] Primary Care at Home [facility])   (formerly [facility])   Phone:  [phone]      Fax:  [phone], [address] [place] [address]    Name:  Sony Weathers  YOB: 1940    Called and spoke with patient's  Adán Weathers. Patient has been accepted for Primary Care at Home services once she is discharged back home from [facility]. Mr. Weathers states that patient does not yet have a discharge date from [facility]. This nurse asked Mr. Weathers to call us when he has a discharge date for his wife so we can schedule a start of care visit.         Ron Hammond RN, Gerontological Nursing-BC, [facility]  Referral Navigator, Home Based Primary Care & Supportive Services

## 2023-10-06 ENCOUNTER — TELEPHONE (OUTPATIENT)
Facility: CLINIC | Age: 83
End: 2023-10-06

## 2023-10-06 NOTE — TELEPHONE ENCOUNTER
[facility] Primary Care at Home [facility])   (formerly [facility])   Phone:  [phone]      Fax:  [phone] [facility], [address] [place] [address], [address] [place] [address]    Name:  Brunswick Skeeters Core  YOB: 1940    Patient's  Fatmata Wallis called our office and said that patient has been discharged from [facility] and is back at home. This nurse spoke with Robb Whitlock NP regarding Primary Care at Home start of care visit for [facility]. Ms. Megan Min can see both Nella Estimable Core (he is currently a [facility] patient) and [facility] on  Thursday 10/19/23. This nurse called and spoke with Mr. Eleonora Wade and he is fine with the 10/19/23 date.        Reinaldo Le RN, Gerontological Nursing-BC, [facility]  Referral Navigator, Home Based Primary Care & Supportive Services

## 2023-10-11 ENCOUNTER — TELEPHONE (OUTPATIENT)
Facility: CLINIC | Age: 83
End: 2023-10-11

## 2023-10-11 NOTE — TELEPHONE ENCOUNTER
LCSW called pt's spouse to inform that [facility] NP will be seeing his wife on 10/19/23 for the Start of Care appointment. LCSW scheduled to come to the home on 10/16/23 @ 2:30pm for completion of [facility] intake paperwork and initial social work assessment.      YAZMIN Johnson, LCSW  Licensed Clinical   [facility] Primary Care at Home  (I) [phone] (W) [phone]

## 2023-10-13 ENCOUNTER — APPOINTMENT (OUTPATIENT)
Facility: HOSPITAL | Age: 83
DRG: 552 | End: 2023-10-13
Payer: MEDICARE

## 2023-10-13 ENCOUNTER — HOSPITAL ENCOUNTER (INPATIENT)
Facility: HOSPITAL | Age: 83
LOS: 1 days | Discharge: NURSING, REHAB OR LONG-TERM CARE | DRG: 552 | End: 2023-10-17
Attending: STUDENT IN AN ORGANIZED HEALTH CARE EDUCATION/TRAINING PROGRAM | Admitting: STUDENT IN AN ORGANIZED HEALTH CARE EDUCATION/TRAINING PROGRAM
Payer: MEDICARE

## 2023-10-13 DIAGNOSIS — N30.00 ACUTE CYSTITIS WITHOUT HEMATURIA: ICD-10-CM

## 2023-10-13 DIAGNOSIS — M51.36 LUMBAR DEGENERATIVE DISC DISEASE: ICD-10-CM

## 2023-10-13 DIAGNOSIS — R26.2 UNABLE TO WALK: Primary | ICD-10-CM

## 2023-10-13 LAB
ANION GAP SERPL CALC-SCNC: 4 MMOL/L (ref 5–15)
APPEARANCE UR: ABNORMAL
BACTERIA URNS QL MICRO: ABNORMAL /HPF
BASOPHILS # BLD: 0.1 K/UL (ref 0–0.1)
BASOPHILS NFR BLD: 1 % (ref 0–1)
BILIRUB UR QL: NEGATIVE
BUN SERPL-MCNC: 15 MG/DL (ref 6–20)
BUN/CREAT SERPL: 12 (ref 12–20)
CALCIUM SERPL-MCNC: 9.1 MG/DL (ref 8.5–10.1)
CHLORIDE SERPL-SCNC: 107 MMOL/L (ref 97–108)
CO2 SERPL-SCNC: 27 MMOL/L (ref 21–32)
COLOR UR: ABNORMAL
CREAT SERPL-MCNC: 1.21 MG/DL (ref 0.55–1.02)
DIFFERENTIAL METHOD BLD: ABNORMAL
EOSINOPHIL # BLD: 0 K/UL (ref 0–0.4)
EOSINOPHIL NFR BLD: 0 % (ref 0–7)
EPITH CASTS URNS QL MICRO: ABNORMAL /LPF
ERYTHROCYTE [DISTWIDTH] IN BLOOD BY AUTOMATED COUNT: 14.7 % (ref 11.5–14.5)
GLUCOSE SERPL-MCNC: 95 MG/DL (ref 65–100)
GLUCOSE UR STRIP.AUTO-MCNC: NEGATIVE MG/DL
HCT VFR BLD AUTO: 36.4 % (ref 35–47)
HGB BLD-MCNC: 11.9 G/DL (ref 11.5–16)
HGB UR QL STRIP: NEGATIVE
IMM GRANULOCYTES # BLD AUTO: 0 K/UL (ref 0–0.04)
IMM GRANULOCYTES NFR BLD AUTO: 0 % (ref 0–0.5)
KETONES UR QL STRIP.AUTO: NEGATIVE MG/DL
LEUKOCYTE ESTERASE UR QL STRIP.AUTO: ABNORMAL
LYMPHOCYTES # BLD: 1.9 K/UL (ref 0.8–3.5)
LYMPHOCYTES NFR BLD: 19 % (ref 12–49)
MCH RBC QN AUTO: 29.5 PG (ref 26–34)
MCHC RBC AUTO-ENTMCNC: 32.7 G/DL (ref 30–36.5)
MCV RBC AUTO: 90.3 FL (ref 80–99)
MONOCYTES # BLD: 1.1 K/UL (ref 0–1)
MONOCYTES NFR BLD: 11 % (ref 5–13)
NEUTS SEG # BLD: 7 K/UL (ref 1.8–8)
NEUTS SEG NFR BLD: 69 % (ref 32–75)
NITRITE UR QL STRIP.AUTO: NEGATIVE
NRBC # BLD: 0 K/UL (ref 0–0.01)
NRBC BLD-RTO: 0 PER 100 WBC
PH UR STRIP: 8 (ref 5–8)
PLATELET # BLD AUTO: 326 K/UL (ref 150–400)
PMV BLD AUTO: 9.5 FL (ref 8.9–12.9)
POTASSIUM SERPL-SCNC: 4.1 MMOL/L (ref 3.5–5.1)
PROT UR STRIP-MCNC: 30 MG/DL
RBC # BLD AUTO: 4.03 M/UL (ref 3.8–5.2)
RBC #/AREA URNS HPF: ABNORMAL /HPF (ref 0–5)
SODIUM SERPL-SCNC: 138 MMOL/L (ref 136–145)
SP GR UR REFRACTOMETRY: 1.01
URINE CULTURE IF INDICATED: ABNORMAL
UROBILINOGEN UR QL STRIP.AUTO: 1 EU/DL (ref 0.2–1)
WBC # BLD AUTO: 10.2 K/UL (ref 3.6–11)
WBC URNS QL MICRO: ABNORMAL /HPF (ref 0–4)

## 2023-10-13 PROCEDURE — 72148 MRI LUMBAR SPINE W/O DYE: CPT

## 2023-10-13 PROCEDURE — 85025 COMPLETE CBC W/AUTO DIFF WBC: CPT

## 2023-10-13 PROCEDURE — 6370000000 HC RX 637 (ALT 250 FOR IP): Performed by: STUDENT IN AN ORGANIZED HEALTH CARE EDUCATION/TRAINING PROGRAM

## 2023-10-13 PROCEDURE — 81001 URINALYSIS AUTO W/SCOPE: CPT

## 2023-10-13 PROCEDURE — 87086 URINE CULTURE/COLONY COUNT: CPT

## 2023-10-13 PROCEDURE — 2580000003 HC RX 258: Performed by: PHYSICIAN ASSISTANT

## 2023-10-13 PROCEDURE — 96365 THER/PROPH/DIAG IV INF INIT: CPT

## 2023-10-13 PROCEDURE — 2580000003 HC RX 258: Performed by: STUDENT IN AN ORGANIZED HEALTH CARE EDUCATION/TRAINING PROGRAM

## 2023-10-13 PROCEDURE — G0378 HOSPITAL OBSERVATION PER HR: HCPCS

## 2023-10-13 PROCEDURE — 99285 EMERGENCY DEPT VISIT HI MDM: CPT

## 2023-10-13 PROCEDURE — 96366 THER/PROPH/DIAG IV INF ADDON: CPT

## 2023-10-13 PROCEDURE — 36415 COLL VENOUS BLD VENIPUNCTURE: CPT

## 2023-10-13 PROCEDURE — 6360000002 HC RX W HCPCS: Performed by: PHYSICIAN ASSISTANT

## 2023-10-13 PROCEDURE — 80048 BASIC METABOLIC PNL TOTAL CA: CPT

## 2023-10-13 RX ORDER — ACETAMINOPHEN 325 MG/1
650 TABLET ORAL EVERY 6 HOURS PRN
Status: DISCONTINUED | OUTPATIENT
Start: 2023-10-13 | End: 2023-10-17 | Stop reason: HOSPADM

## 2023-10-13 RX ORDER — PRIMIDONE 50 MG/1
50 TABLET ORAL EVERY 8 HOURS SCHEDULED
Status: DISCONTINUED | OUTPATIENT
Start: 2023-10-13 | End: 2023-10-17 | Stop reason: HOSPADM

## 2023-10-13 RX ORDER — LEVOTHYROXINE SODIUM 0.05 MG/1
50 TABLET ORAL DAILY
Status: DISCONTINUED | OUTPATIENT
Start: 2023-10-14 | End: 2023-10-17 | Stop reason: HOSPADM

## 2023-10-13 RX ORDER — SODIUM CHLORIDE 0.9 % (FLUSH) 0.9 %
5-40 SYRINGE (ML) INJECTION PRN
Status: DISCONTINUED | OUTPATIENT
Start: 2023-10-13 | End: 2023-10-17 | Stop reason: HOSPADM

## 2023-10-13 RX ORDER — SODIUM CHLORIDE 9 MG/ML
INJECTION, SOLUTION INTRAVENOUS PRN
Status: DISCONTINUED | OUTPATIENT
Start: 2023-10-13 | End: 2023-10-17 | Stop reason: HOSPADM

## 2023-10-13 RX ORDER — POLYETHYLENE GLYCOL 3350 17 G/17G
17 POWDER, FOR SOLUTION ORAL DAILY PRN
Status: DISCONTINUED | OUTPATIENT
Start: 2023-10-13 | End: 2023-10-17 | Stop reason: HOSPADM

## 2023-10-13 RX ORDER — SODIUM CHLORIDE 0.9 % (FLUSH) 0.9 %
5-40 SYRINGE (ML) INJECTION EVERY 12 HOURS SCHEDULED
Status: DISCONTINUED | OUTPATIENT
Start: 2023-10-13 | End: 2023-10-17 | Stop reason: HOSPADM

## 2023-10-13 RX ORDER — ONDANSETRON 2 MG/ML
4 INJECTION INTRAMUSCULAR; INTRAVENOUS EVERY 6 HOURS PRN
Status: DISCONTINUED | OUTPATIENT
Start: 2023-10-13 | End: 2023-10-17 | Stop reason: HOSPADM

## 2023-10-13 RX ORDER — PREGABALIN 25 MG/1
75 CAPSULE ORAL 3 TIMES DAILY
Status: DISCONTINUED | OUTPATIENT
Start: 2023-10-13 | End: 2023-10-17 | Stop reason: HOSPADM

## 2023-10-13 RX ORDER — ONDANSETRON 4 MG/1
4 TABLET, ORALLY DISINTEGRATING ORAL EVERY 8 HOURS PRN
Status: DISCONTINUED | OUTPATIENT
Start: 2023-10-13 | End: 2023-10-17 | Stop reason: HOSPADM

## 2023-10-13 RX ORDER — ENOXAPARIN SODIUM 100 MG/ML
40 INJECTION SUBCUTANEOUS DAILY
Status: DISCONTINUED | OUTPATIENT
Start: 2023-10-14 | End: 2023-10-17 | Stop reason: HOSPADM

## 2023-10-13 RX ORDER — ATORVASTATIN CALCIUM 10 MG/1
10 TABLET, FILM COATED ORAL DAILY
Status: DISCONTINUED | OUTPATIENT
Start: 2023-10-14 | End: 2023-10-17 | Stop reason: HOSPADM

## 2023-10-13 RX ORDER — 0.9 % SODIUM CHLORIDE 0.9 %
1000 INTRAVENOUS SOLUTION INTRAVENOUS ONCE
Status: COMPLETED | OUTPATIENT
Start: 2023-10-13 | End: 2023-10-13

## 2023-10-13 RX ORDER — ACETAMINOPHEN 650 MG/1
650 SUPPOSITORY RECTAL EVERY 6 HOURS PRN
Status: DISCONTINUED | OUTPATIENT
Start: 2023-10-13 | End: 2023-10-17 | Stop reason: HOSPADM

## 2023-10-13 RX ADMIN — PRIMIDONE 50 MG: 50 TABLET ORAL at 20:56

## 2023-10-13 RX ADMIN — PREGABALIN 75 MG: 25 CAPSULE ORAL at 20:57

## 2023-10-13 RX ADMIN — SODIUM CHLORIDE 1000 MG: 900 INJECTION INTRAVENOUS at 11:23

## 2023-10-13 RX ADMIN — ACETAMINOPHEN 650 MG: 325 TABLET ORAL at 20:57

## 2023-10-13 RX ADMIN — SODIUM CHLORIDE, PRESERVATIVE FREE 10 ML: 5 INJECTION INTRAVENOUS at 20:58

## 2023-10-13 RX ADMIN — SODIUM CHLORIDE 1000 ML: 9 INJECTION, SOLUTION INTRAVENOUS at 11:22

## 2023-10-13 ASSESSMENT — PAIN DESCRIPTION - FREQUENCY: FREQUENCY: CONTINUOUS

## 2023-10-13 ASSESSMENT — PAIN SCALES - GENERAL: PAINLEVEL_OUTOF10: 4

## 2023-10-13 ASSESSMENT — PAIN DESCRIPTION - PAIN TYPE: TYPE: CHRONIC PAIN

## 2023-10-13 ASSESSMENT — PAIN DESCRIPTION - ORIENTATION: ORIENTATION: RIGHT;LEFT

## 2023-10-13 ASSESSMENT — PAIN DESCRIPTION - LOCATION: LOCATION: LEG

## 2023-10-13 NOTE — H&P
Hospitalist Admission Note    NAME:   Libia Weathers   : 1940   MRN: [id number]     Date/Time: 10/13/2023 1:37 PM    Patient PCP: Hanna Hidalgo MD    ______________________________________________________________________  Given the patient's current clinical presentation, I have a high level of concern for decompensation if discharged from the emergency department. Complex decision making was performed, which includes reviewing the patient's available past medical records, laboratory results, and x-ray films. My assessment of this patient's clinical condition and my plan of care is as follows. Assessment / Plan:    Bilateral lower extremity weakness  Low back pain  History of lumbar spondylosis    She was admitted last month for similar complaint, MRI showed lumbar spondylosis  Was evaluated by neurology and neurosurgery at that time and recommended nonoperative management. Was eventually discharged to rehab, and recently discharged home. Now coming in with worsening bilateral lower extremity weakness. Given symptoms are worse we will repeat MRI. PT OT evaluation, will likely need SNF versus LTC    UTI  UA positive  Follow-up cultures  IV antibiotics    Hypothyroidism  Tremors  Continue with Synthroid  Continue Lyrica  Continue primidone    Medical Decision Making:   I personally reviewed labs: CBC, BMP  I personally reviewed imaging: MRI pending  I personally reviewed EKG:  Toxic drug monitoring: None  Discussed case with: ED provider. After discussion I am in agreement that acuity of patient's medical condition necessitates hospital stay. Code Status: Full code  DVT Prophylaxis: Lovenox  GI Prophylaxis:  Baseline:     Subjective:   CHIEF COMPLAINT: Difficulty ambulation    HISTORY OF PRESENT ILLNESS:     Rylee Lazaro is a 82 y.o.  female with PMHx as below presented with worsening lower extremity weakness and difficulty ambulation since yesterday.   She was recently admitted to our hospital

## 2023-10-13 NOTE — ED NOTES
Pt SpO2 89% RA. 2L NC O2 placed. Pt currently 98%SpO2 with O2.       Grisel Burr [place]  72/57/33 [id number]

## 2023-10-14 LAB
BACTERIA SPEC CULT: NORMAL
CC UR VC: NORMAL
SERVICE CMNT-IMP: NORMAL

## 2023-10-14 PROCEDURE — 2580000003 HC RX 258: Performed by: STUDENT IN AN ORGANIZED HEALTH CARE EDUCATION/TRAINING PROGRAM

## 2023-10-14 PROCEDURE — 6370000000 HC RX 637 (ALT 250 FOR IP): Performed by: STUDENT IN AN ORGANIZED HEALTH CARE EDUCATION/TRAINING PROGRAM

## 2023-10-14 PROCEDURE — 2700000000 HC OXYGEN THERAPY PER DAY

## 2023-10-14 PROCEDURE — 94760 N-INVAS EAR/PLS OXIMETRY 1: CPT

## 2023-10-14 PROCEDURE — 6360000002 HC RX W HCPCS: Performed by: STUDENT IN AN ORGANIZED HEALTH CARE EDUCATION/TRAINING PROGRAM

## 2023-10-14 PROCEDURE — G0378 HOSPITAL OBSERVATION PER HR: HCPCS

## 2023-10-14 RX ORDER — DEXTRAN 70, GLYCERIN, HYPROMELLOSE 1; 2; 3 MG/ML; MG/ML; MG/ML
1 SOLUTION/ DROPS OPHTHALMIC EVERY 4 HOURS PRN
Status: DISCONTINUED | OUTPATIENT
Start: 2023-10-14 | End: 2023-10-17 | Stop reason: HOSPADM

## 2023-10-14 RX ADMIN — PREGABALIN 75 MG: 25 CAPSULE ORAL at 13:54

## 2023-10-14 RX ADMIN — SODIUM CHLORIDE, PRESERVATIVE FREE 10 ML: 5 INJECTION INTRAVENOUS at 21:54

## 2023-10-14 RX ADMIN — SODIUM CHLORIDE 1000 MG: 900 INJECTION INTRAVENOUS at 11:05

## 2023-10-14 RX ADMIN — PRIMIDONE 50 MG: 50 TABLET ORAL at 21:54

## 2023-10-14 RX ADMIN — PRIMIDONE 50 MG: 50 TABLET ORAL at 13:54

## 2023-10-14 RX ADMIN — SODIUM CHLORIDE, PRESERVATIVE FREE 10 ML: 5 INJECTION INTRAVENOUS at 09:14

## 2023-10-14 RX ADMIN — ATORVASTATIN CALCIUM 10 MG: 10 TABLET, FILM COATED ORAL at 09:04

## 2023-10-14 RX ADMIN — LEVOTHYROXINE SODIUM 50 MCG: 0.05 TABLET ORAL at 05:50

## 2023-10-14 RX ADMIN — PRIMIDONE 50 MG: 50 TABLET ORAL at 05:50

## 2023-10-14 RX ADMIN — ENOXAPARIN SODIUM 40 MG: 100 INJECTION SUBCUTANEOUS at 09:04

## 2023-10-14 RX ADMIN — PREGABALIN 75 MG: 25 CAPSULE ORAL at 21:53

## 2023-10-14 RX ADMIN — PREGABALIN 75 MG: 25 CAPSULE ORAL at 09:04

## 2023-10-14 ASSESSMENT — PAIN SCALES - GENERAL: PAINLEVEL_OUTOF10: 0

## 2023-10-15 PROCEDURE — 2700000000 HC OXYGEN THERAPY PER DAY

## 2023-10-15 PROCEDURE — 94760 N-INVAS EAR/PLS OXIMETRY 1: CPT

## 2023-10-15 PROCEDURE — 97530 THERAPEUTIC ACTIVITIES: CPT

## 2023-10-15 PROCEDURE — G0378 HOSPITAL OBSERVATION PER HR: HCPCS

## 2023-10-15 PROCEDURE — 97535 SELF CARE MNGMENT TRAINING: CPT

## 2023-10-15 PROCEDURE — 6370000000 HC RX 637 (ALT 250 FOR IP): Performed by: NURSE PRACTITIONER

## 2023-10-15 PROCEDURE — 6360000002 HC RX W HCPCS: Performed by: STUDENT IN AN ORGANIZED HEALTH CARE EDUCATION/TRAINING PROGRAM

## 2023-10-15 PROCEDURE — 6370000000 HC RX 637 (ALT 250 FOR IP): Performed by: STUDENT IN AN ORGANIZED HEALTH CARE EDUCATION/TRAINING PROGRAM

## 2023-10-15 PROCEDURE — 2580000003 HC RX 258: Performed by: STUDENT IN AN ORGANIZED HEALTH CARE EDUCATION/TRAINING PROGRAM

## 2023-10-15 PROCEDURE — 97165 OT EVAL LOW COMPLEX 30 MIN: CPT

## 2023-10-15 PROCEDURE — 97161 PT EVAL LOW COMPLEX 20 MIN: CPT

## 2023-10-15 RX ADMIN — ACETAMINOPHEN 650 MG: 325 TABLET ORAL at 21:21

## 2023-10-15 RX ADMIN — PRIMIDONE 50 MG: 50 TABLET ORAL at 21:20

## 2023-10-15 RX ADMIN — LEVOTHYROXINE SODIUM 50 MCG: 0.05 TABLET ORAL at 05:42

## 2023-10-15 RX ADMIN — SODIUM CHLORIDE, PRESERVATIVE FREE 10 ML: 5 INJECTION INTRAVENOUS at 21:22

## 2023-10-15 RX ADMIN — PRIMIDONE 50 MG: 50 TABLET ORAL at 05:42

## 2023-10-15 RX ADMIN — PREGABALIN 75 MG: 25 CAPSULE ORAL at 14:43

## 2023-10-15 RX ADMIN — ATORVASTATIN CALCIUM 10 MG: 10 TABLET, FILM COATED ORAL at 09:00

## 2023-10-15 RX ADMIN — PREGABALIN 75 MG: 25 CAPSULE ORAL at 21:20

## 2023-10-15 RX ADMIN — PRIMIDONE 50 MG: 50 TABLET ORAL at 14:43

## 2023-10-15 RX ADMIN — SODIUM CHLORIDE, PRESERVATIVE FREE 10 ML: 5 INJECTION INTRAVENOUS at 09:01

## 2023-10-15 RX ADMIN — SODIUM CHLORIDE 1000 MG: 900 INJECTION INTRAVENOUS at 09:08

## 2023-10-15 RX ADMIN — DEXTRAN 70, GLYCERIN, HYPROMELLOSE 1 DROP: 1; 2; 3 SOLUTION/ DROPS OPHTHALMIC at 06:29

## 2023-10-15 RX ADMIN — ENOXAPARIN SODIUM 40 MG: 100 INJECTION SUBCUTANEOUS at 09:00

## 2023-10-15 ASSESSMENT — PAIN SCALES - GENERAL
PAINLEVEL_OUTOF10: 0
PAINLEVEL_OUTOF10: 0
PAINLEVEL_OUTOF10: 3
PAINLEVEL_OUTOF10: 0

## 2023-10-15 ASSESSMENT — PAIN DESCRIPTION - LOCATION: LOCATION: ABDOMEN

## 2023-10-15 ASSESSMENT — PAIN DESCRIPTION - DESCRIPTORS: DESCRIPTORS: ACHING

## 2023-10-15 ASSESSMENT — PAIN DESCRIPTION - ORIENTATION: ORIENTATION: MID

## 2023-10-15 NOTE — ACP (ADVANCE CARE PLANNING)
Advance Care Planning     Advance Care Planning Inpatient Note  [facility] Department    Today's Date: 10/15/2023  Unit: MRM 1 CLINICAL OBS    Received request from IDT Member. Upon review of chart and communication with care team, patient's decision making abilities are not in question. . Patient was/were present in the room during visit. Goals of ACP Conversation:  Discuss advance care planning documents    Health Care Decision Makers:       Primary Decision Maker: Nimesh Weathers - Nafisa - [phone]    Advance Care Planning Documents (Patient Wishes):  Living Will/Advance Directive     Assessment:   responded to staff request, through consults/orders, for an Advance Medical Directive consult. Patient affirmed that she talked with staff about AMD, but she seemed not clear about her healthcare decisions. She indicated she would like to talk it over further with her . A copy of the document was left, patient advised to contact pastoral care for for assistance, if need be.       Interventions:  Encouraged ongoing ACP conversation with future decision makers and loved ones    Care Preferences Communicated:   No    Outcomes/Plan:  ACP Discussion: Completed    Electronically signed by Travis Chau [facility] on 10/15/2023 at 10:15 AM

## 2023-10-16 PROBLEM — R26.2 AMBULATORY DYSFUNCTION: Status: ACTIVE | Noted: 2023-10-16

## 2023-10-16 PROCEDURE — 94760 N-INVAS EAR/PLS OXIMETRY 1: CPT

## 2023-10-16 PROCEDURE — 2580000003 HC RX 258: Performed by: STUDENT IN AN ORGANIZED HEALTH CARE EDUCATION/TRAINING PROGRAM

## 2023-10-16 PROCEDURE — 6360000002 HC RX W HCPCS: Performed by: STUDENT IN AN ORGANIZED HEALTH CARE EDUCATION/TRAINING PROGRAM

## 2023-10-16 PROCEDURE — 6370000000 HC RX 637 (ALT 250 FOR IP): Performed by: STUDENT IN AN ORGANIZED HEALTH CARE EDUCATION/TRAINING PROGRAM

## 2023-10-16 PROCEDURE — 1100000000 HC RM PRIVATE

## 2023-10-16 PROCEDURE — 2700000000 HC OXYGEN THERAPY PER DAY

## 2023-10-16 RX ADMIN — SODIUM CHLORIDE, PRESERVATIVE FREE 10 ML: 5 INJECTION INTRAVENOUS at 21:58

## 2023-10-16 RX ADMIN — ENOXAPARIN SODIUM 40 MG: 100 INJECTION SUBCUTANEOUS at 08:08

## 2023-10-16 RX ADMIN — PRIMIDONE 50 MG: 50 TABLET ORAL at 13:43

## 2023-10-16 RX ADMIN — ATORVASTATIN CALCIUM 10 MG: 10 TABLET, FILM COATED ORAL at 08:08

## 2023-10-16 RX ADMIN — SODIUM CHLORIDE, PRESERVATIVE FREE 10 ML: 5 INJECTION INTRAVENOUS at 08:08

## 2023-10-16 RX ADMIN — PREGABALIN 75 MG: 25 CAPSULE ORAL at 13:43

## 2023-10-16 RX ADMIN — PRIMIDONE 50 MG: 50 TABLET ORAL at 07:00

## 2023-10-16 RX ADMIN — PREGABALIN 75 MG: 25 CAPSULE ORAL at 08:08

## 2023-10-16 RX ADMIN — PRIMIDONE 50 MG: 50 TABLET ORAL at 21:55

## 2023-10-16 RX ADMIN — LEVOTHYROXINE SODIUM 50 MCG: 0.05 TABLET ORAL at 07:00

## 2023-10-16 RX ADMIN — PREGABALIN 75 MG: 25 CAPSULE ORAL at 21:56

## 2023-10-16 ASSESSMENT — PAIN SCALES - GENERAL
PAINLEVEL_OUTOF10: 0
PAINLEVEL_OUTOF10: 0

## 2023-10-17 VITALS
TEMPERATURE: 97.5 F | RESPIRATION RATE: 16 BRPM | DIASTOLIC BLOOD PRESSURE: 73 MMHG | HEART RATE: 65 BPM | BODY MASS INDEX: 22.21 KG/M2 | WEIGHT: 130.07 LBS | SYSTOLIC BLOOD PRESSURE: 128 MMHG | HEIGHT: 64 IN | OXYGEN SATURATION: 94 %

## 2023-10-17 PROCEDURE — 2580000003 HC RX 258: Performed by: STUDENT IN AN ORGANIZED HEALTH CARE EDUCATION/TRAINING PROGRAM

## 2023-10-17 PROCEDURE — 6370000000 HC RX 637 (ALT 250 FOR IP): Performed by: STUDENT IN AN ORGANIZED HEALTH CARE EDUCATION/TRAINING PROGRAM

## 2023-10-17 PROCEDURE — 2700000000 HC OXYGEN THERAPY PER DAY

## 2023-10-17 PROCEDURE — 97530 THERAPEUTIC ACTIVITIES: CPT

## 2023-10-17 PROCEDURE — 94760 N-INVAS EAR/PLS OXIMETRY 1: CPT

## 2023-10-17 PROCEDURE — 6360000002 HC RX W HCPCS: Performed by: STUDENT IN AN ORGANIZED HEALTH CARE EDUCATION/TRAINING PROGRAM

## 2023-10-17 RX ORDER — PREGABALIN 50 MG/1
50 CAPSULE ORAL 3 TIMES DAILY
Qty: 15 CAPSULE | Refills: 0 | Status: SHIPPED | OUTPATIENT
Start: 2023-10-17 | End: 2023-10-27

## 2023-10-17 RX ADMIN — ATORVASTATIN CALCIUM 10 MG: 10 TABLET, FILM COATED ORAL at 08:26

## 2023-10-17 RX ADMIN — SODIUM CHLORIDE, PRESERVATIVE FREE 10 ML: 5 INJECTION INTRAVENOUS at 08:26

## 2023-10-17 RX ADMIN — LEVOTHYROXINE SODIUM 50 MCG: 0.05 TABLET ORAL at 08:26

## 2023-10-17 RX ADMIN — ENOXAPARIN SODIUM 40 MG: 100 INJECTION SUBCUTANEOUS at 08:26

## 2023-10-17 RX ADMIN — PRIMIDONE 50 MG: 50 TABLET ORAL at 06:04

## 2023-10-17 RX ADMIN — PRIMIDONE 50 MG: 50 TABLET ORAL at 13:12

## 2023-10-17 RX ADMIN — PREGABALIN 75 MG: 25 CAPSULE ORAL at 13:12

## 2023-10-17 RX ADMIN — PREGABALIN 75 MG: 25 CAPSULE ORAL at 09:36

## 2023-10-17 NOTE — DISCHARGE SUMMARY
Discharge Summary    Name: Sergey Tian  [id number]  YOB: 1940 (Age: 82 y.o.)   Date of Admission: 10/13/2023  Date of Discharge: 10/17/2023  Attending Physician: Ruben Yañez MD    Discharge Diagnosis:   Neuropathy/Multilevel spine DJD  Ambulatory dysfunction hypertension  Hypertension  CYNTHIA  Hypothyroidism  Consultations:  IP CONSULT TO CASE MANAGEMENT  IP CONSULT TO HOSPITALIST  IP CONSULT TO [address] [place] [address]      Brief Admission History/Reason for Admission Per Christiano Woodruff MD:   Sergey Tian is a 82 y.o.  female with PMHx as below presented with worsening lower extremity weakness and difficulty ambulation since yesterday. She was recently admitted to our hospital last month for intractable low back pain, was seen by neurologist and neurosurgery at that time. And then she was discharged to rehab. She was recently discharged home from the rehab. She mentions she could not get into her rollator yesterday due to the weakness. She also has numbness in bilateral lower extremity. She has mild chronic lower back pain. She denies any cough, chest pain, belly pain, change in bladder or bowel habits. She has some urinary frequency. Brief Hospital Course by Main Problems:   Bilateral lower extremity weakness  Low back pain  History of lumbar spondylosis     She was admitted last month for similar complaint, MRI showed lumbar spondylosis  Was evaluated by neurology and neurosurgery at that time and recommended nonoperative management. Was eventually discharged to rehab, and recently discharged home. Now coming in with worsening bilateral lower extremity weakness. Given symptoms are worse, MRI repeated. MRI L spine unchanged from prior MRI. Muscle strength similar to past admission. Recs that she she will benefit from SNF/IPR.     Decrease lyrica to 50 mg TID from 75 mg TID due to crcl     UTI  UA did not   Follow-up cultures, mixed urogenital

## 2023-10-17 NOTE — CARE COORDINATION
Communication to Patient/Family:  Met with patient and family and they are agreeable to the transition plan. The Plan for Transition of Care is related to the following treatment goals: skilled nursing facility    The Patient and/or patient representative was provided with a choice of provider and agrees  with the discharge plan. Yes [x] No []    A Freedom of choice list was provided with basic dialogue that supports the patient's individualized plan of care/goals and shares the quality data associated with the providers. Yes [x] No []    SNF/Rehab Transition:  Patient has been accepted to [facility] SNF/Rehab and meets criteria for admission. Patient will transported by hospital to home and expected to leave at 1700pm.    Communication to SNF/Rehab:  Bedside RN,  has been notified to update the transition plan to the facility and call report to   Discharge information has been updated on the AVS. And communicated to facility via [employer]/All Scripts, or CC link. Nursing Please include all hard scripts for controlled substances, med rec and dc summary, and AVS in packet. Reviewed and confirmed with facility, nursing, can manage the patient care needs for the following:     [place] with (X) only those applicable:  Medication:  [x]Medications are available at the facility  []IV Antibiotics    [x]Controlled Substance - hard copies available sent.   []Weekly Labs    Equipment:  []CPAP/BiPAP  []Wound Vacuum  []Ott or Urinary Device  []PICC/Central Line  []Nebulizer  []Ventilator    Treatment:  []Isolation (for MRSA, VRE, etc.)  []Surgical Drain Management  []Tracheostomy Care  []Dressing Changes  []Dialysis with transportation  []PEG Care  []Oxygen  []Daily Weights for Heart Failure    Dietary:  []Any diet limitations  []Tube Feedings   []Total Parenteral Management (TPN)    Financial Resources:  []Medicaid Application Completed    []UAI Completed and copy given to pt/family  and
[facility] is not able to pick the patient to transport until midnight. Called nursing supervisor and permission given to use Hospital to Home. Called and spoke with [facility] with hospital to home and they can transport the patient at 1700pm today to [facility]. Called liason at [facility] and relayed the transport time. Lo Meier RN BSN CRM        [phone]
Transition of Care Plan:    RUR: Calculating  Prior Level of Functioning: Assistance with adl/iadl's  Disposition: SNF  If SNF or IPR: Date FOC offered: 10/13/23  Date [address] received: 10/16/23  Accepting facility: Patient chose [facility] and they accepted. Date authorization started with reference number: 10/16/23  Date authorization received and expires: Awaiting auth. Follow up appointments: To be done by facility  DME needed: None  Transportation at discharge[facility]  [facility]/ letter given: To be given prior to discharge. Is patient a  and connected with VA? No   If yes, was [employer] transfer form completed and VA notified? N/A  Caregiver Contact:   Discharge Caregiver contacted prior to discharge? Caregiver to be contacted prior to discharge. Care Conference needed? No  Barriers to discharge:  Medical stability        Spoke with patient and she states she would like to go to [facility] for short term rehab. [facility] has accepted and auth sent to [employer] today. Lo Meier RN BSN CRM        [phone]
Transition of Care Plan: Patient is being discharged today to [id number] [place] [address] Road: Calculating  Prior Level of Functioning: Assistance with adl/iadl's  Disposition: SNF  If SNF or IPR: Date FOC offered: 10/13/23  Date [address] received: 10/16/23  Accepting facility: Patient chose [address] [place] [address] and they accepted. Date authorization started with reference number: 10/16/23  Date authorization received and 10/17/23 and  they did not have the auth number however the reference number is [id number] expires: Auth good from 10/16/ to 10/18/23  Follow up appointments: To be done by facility  DME needed: None  Transportation at discharge: [facility]  IM/IMM Medicare/ letter given: Second I'm letter given 10/17/23    Is patient a  and connected with VA? No              If yes, was [employer] transfer form completed and VA notified? N/A  Caregiver Contact:   Discharge Caregiver contacted prior to discharge? Caregiver contacted   Care Conference needed? No  Barriers to discharge:  None        Patient is being discharged to [address] [place] [address] today. Referral sent to [facility] and awaiting their response. PCS with medicals given to nursing. Nursing to call report to .  and patient in agreement to the discharge. Received call from trinh at [facility] that patient is approved to go to [address] [place] [address]. The reference number is [id number]. The [facility] is good from 10/16/23 to 10/18/23. Trinh did not have the auth number for me. Lo Meier  RN BSN CRM        [phone]
[facility], which is supposed to evaluate them both soon. Pt and spouse agreeable with SNF referrals being sent to the following facilities: [facility] and [address] [place] [address]. Referrals placed via [employer]. Will need PT/OT evaluations ordered to support request for insurance authorization. Care management will continue to be available to assist as transition of care planning needs arise. Full assessment below:         10/13/23 1028   Service Assessment   Patient Orientation Alert and Oriented   Cognition Alert   History Provided By Patient   Primary Caregiver Self  (With support of spouse)   Support Systems Spouse/Significant Other   Patient's Healthcare Decision Maker is: Legal Next of Kin  (Spouse, [employer], is legal next of kin)   PCP Verified by CM Yes  (Dr. Reina Ponce, in the process of transitioning to Home Based Primary Care with [employer])   Last Visit to PCP Within last 6 months   Prior Functional Level Assistance with the following:;Cooking;Housework; Mobility; Shopping   Current Functional Level Assistance with the following:;Bathing;Dressing; Toileting;Cooking;Housework; Shopping;Mobility   Can patient return to prior living arrangement Yes   Ability to make needs known: Good   Family able to assist with home care needs: Yes  (Spouse is assisting as much as he is able, he is s/p CVA and does not have strength to lift pt when she falls)   Would you like for me to discuss the discharge plan with any other family members/significant others, and if so, who? Yes  (Spouse, [employer])   Financial Resources Medicare  ([employer] Medicare)   CM/SW Referral Other (see comment)  (CM consult for evaluation of SNF placement/HH needs)   Social/Functional History   Lives With Spouse   Type of [address] [place] [address]  Two level; Able to Live on Main level with bedroom/bathroom   Home Access Stairs to enter with rails  (Spouse is having ramp built at the end of this month (Oct '23))   Entrance Stairs -

## 2023-10-30 ENCOUNTER — TELEPHONE (OUTPATIENT)
Facility: CLINIC | Age: 83
End: 2023-10-30

## 2023-10-30 NOTE — TELEPHONE ENCOUNTER
Storm Dodgeedilson is the patient's spouse.   He called to update Armin Jj NP that the patient is home from the hospital.  His callback is [phone]

## 2023-10-31 ENCOUNTER — TELEPHONE (OUTPATIENT)
Facility: CLINIC | Age: 83
End: 2023-10-31

## 2023-10-31 NOTE — TELEPHONE ENCOUNTER
Telephone call to  Td Royal to advise that provider Michelle Cruz NP is unable to fill prescriptions for pt until her start of care with our practice on 11/17/23. Discussed whether she had new medications that she was not on previously and Td Royal said no. Advised that she should have a supply of meds on hand and if she needs a refill she should contact her still current PCP. Td Royal verbalized frustration with wife being an alarmist about her prescriptions. He feels they can manage this until Michelle Cruz sees pt on 11/17.

## 2023-10-31 NOTE — TELEPHONE ENCOUNTER
Per Nikhil Keen NP, I called the patient to offer an [facility] acute [facility] appointment on 11/17/23, arrival between 9am-1pm.  I spoke with Enrico Crigler, the patient's spouse. He accepted the appointment offer and confirmed. During the call, Mando stated that [address] [place] [address] told the patient that she had 10 days to see her PCP or her prescriptions would be cancelled.   Mando asks for a callback at [phone]

## 2023-11-02 ENCOUNTER — TELEPHONE (OUTPATIENT)
Facility: CLINIC | Age: 83
End: 2023-11-02

## 2023-11-02 NOTE — TELEPHONE ENCOUNTER
LCSW called pt's spouse and left voicemail. LCSW attempting to schedule in-home appointment for completion of intake paperwork and initial social work assessment.      YAZMIN Honeycutt, LCSW  Licensed Clinical   [facility]  (g) [phone] (r) [phone]

## 2023-11-10 ENCOUNTER — TELEPHONE (OUTPATIENT)
Facility: CLINIC | Age: 83
End: 2023-11-10

## 2023-11-10 ENCOUNTER — OFFICE VISIT (OUTPATIENT)
Facility: CLINIC | Age: 83
End: 2023-11-10

## 2023-11-10 DIAGNOSIS — Z76.89 ENCOUNTER FOR SOCIAL WORK INTERVENTION: Primary | ICD-10-CM

## 2023-11-10 NOTE — TELEPHONE ENCOUNTER
Called patient to confirm their in home visit with Rhoit Flores on 11/17/23, arrival between 9-1. Spoke with , they confirmed the appointment and answered the covid screen questions.  Does anyone in the household have covid no  Have there been any changes to insurance no or location no

## 2023-11-13 NOTE — PROGRESS NOTES
[facility] Primary Care at [address]  Initial Social Work Assessment    Date of Initial In-Home Visit: 11/10/23    Reason for Assessment: Completion of [address] intake paperwork    Sources of Information: Pt, spouse    SOCIAL HISTORY  Relationship Status:   [] Single  [x] :  44 years, 2nd , no children  [] Significant Other  []   [] /  [] Other:     Living Circumstances: Pt lives at home with her , Madelyn Walters    Current/Type of Housing:  [] Public/subsidized housing  [] Apartment, Is there an elevator:   [] Assisted Living  [x] [employer], How many floors: 2, pt's bedroom is on the main living level of the home as she cannot navigate stairs to go upstairs    [place] per month: $2,700/month  Source of Income:    [x] Social Security   [] SSI   [x] Pension   [] Employment Income   [] Spouse income   [] Other:    Insurance Information:   [x] Medicare   [] Medicaid   [] [employer]   [] Other:     Are you having trouble paying for things like rent, food, medications? Yes, pt and spouse receive Meals on Wheels and Mom's Meals for food resources that are sponsored    Is there an agency or person who pays your bills? If yes, who? No, spouse and pt manage their own finances    [address]: Pt rec's Meals on Wheels and Mom's Meals    Problem with bed bugs, odors, pests, or pets? Yes, fleas have been reported in the home. Their large dog, Kameron Lion, was scratching herself quite a bit during today's visit. LCSW did not observe any noticeable fleas, however the main living room was fairly dark    Working smoke alarm? [x] Yes [] No    Difficulty getting to exits from the home? [employer] in [employer] installed a large wooden ramp from the front door to the ground level    [address]: Very limited.  Pt has 1 friend, Kee Cristina, but otherwise is socially isolated with her     How often do social supports

## 2023-11-16 DIAGNOSIS — N28.9 IMPAIRED RENAL FUNCTION: Primary | ICD-10-CM

## 2023-11-16 DIAGNOSIS — E78.5 HYPERLIPIDEMIA, UNSPECIFIED HYPERLIPIDEMIA TYPE: ICD-10-CM

## 2023-11-16 DIAGNOSIS — E03.9 HYPOTHYROIDISM, UNSPECIFIED TYPE: ICD-10-CM

## 2023-11-17 ENCOUNTER — CLINICAL DOCUMENTATION (OUTPATIENT)
Facility: CLINIC | Age: 83
End: 2023-11-17

## 2023-11-17 ENCOUNTER — OFFICE VISIT (OUTPATIENT)
Facility: CLINIC | Age: 83
End: 2023-11-17

## 2023-11-17 VITALS
HEIGHT: 62 IN | WEIGHT: 125 LBS | HEART RATE: 78 BPM | BODY MASS INDEX: 23 KG/M2 | OXYGEN SATURATION: 94 % | DIASTOLIC BLOOD PRESSURE: 70 MMHG | SYSTOLIC BLOOD PRESSURE: 140 MMHG | RESPIRATION RATE: 16 BRPM

## 2023-11-17 DIAGNOSIS — E03.9 HYPOTHYROIDISM, UNSPECIFIED TYPE: ICD-10-CM

## 2023-11-17 DIAGNOSIS — K58.9 IRRITABLE BOWEL SYNDROME WITHOUT DIARRHEA: ICD-10-CM

## 2023-11-17 DIAGNOSIS — E78.2 MIXED HYPERLIPIDEMIA: ICD-10-CM

## 2023-11-17 DIAGNOSIS — B02.29 POSTHERPETIC NEURALGIA: ICD-10-CM

## 2023-11-17 DIAGNOSIS — E78.5 HYPERLIPIDEMIA, UNSPECIFIED HYPERLIPIDEMIA TYPE: ICD-10-CM

## 2023-11-17 DIAGNOSIS — M81.0 AGE-RELATED OSTEOPOROSIS WITHOUT CURRENT PATHOLOGICAL FRACTURE: ICD-10-CM

## 2023-11-17 DIAGNOSIS — R29.898 BILATERAL LEG WEAKNESS: ICD-10-CM

## 2023-11-17 DIAGNOSIS — Z86.718 PERSONAL HISTORY OF DVT (DEEP VEIN THROMBOSIS): ICD-10-CM

## 2023-11-17 DIAGNOSIS — M47.26 OSTEOARTHRITIS OF SPINE WITH RADICULOPATHY, LUMBAR REGION: ICD-10-CM

## 2023-11-17 DIAGNOSIS — F41.8 ANXIETY WITH DEPRESSION: ICD-10-CM

## 2023-11-17 DIAGNOSIS — Z76.89 ENCOUNTER TO ESTABLISH CARE: Primary | ICD-10-CM

## 2023-11-17 DIAGNOSIS — Z00.00 ENCOUNTER FOR ANNUAL WELLNESS EXAM IN MEDICARE PATIENT: ICD-10-CM

## 2023-11-17 DIAGNOSIS — N28.9 IMPAIRED RENAL FUNCTION: ICD-10-CM

## 2023-11-17 DIAGNOSIS — G25.0 ESSENTIAL TREMOR: ICD-10-CM

## 2023-11-17 PROBLEM — R26.2 UNABLE TO AMBULATE: Status: RESOLVED | Noted: 2023-09-16 | Resolved: 2023-11-17

## 2023-11-17 PROBLEM — R23.3 EASY BRUISING: Status: RESOLVED | Noted: 2017-08-31 | Resolved: 2023-11-17

## 2023-11-17 LAB
ALBUMIN SERPL-MCNC: 3.5 G/DL (ref 3.5–5)
ALBUMIN/GLOB SERPL: 1.2 (ref 1.1–2.2)
ALP SERPL-CCNC: 106 U/L (ref 45–117)
ALT SERPL-CCNC: 13 U/L (ref 12–78)
ANION GAP SERPL CALC-SCNC: 6 MMOL/L (ref 5–15)
AST SERPL-CCNC: 18 U/L (ref 15–37)
BILIRUB SERPL-MCNC: 0.3 MG/DL (ref 0.2–1)
BUN SERPL-MCNC: 21 MG/DL (ref 6–20)
BUN/CREAT SERPL: 16 (ref 12–20)
CALCIUM SERPL-MCNC: 9 MG/DL (ref 8.5–10.1)
CHLORIDE SERPL-SCNC: 102 MMOL/L (ref 97–108)
CHOLEST SERPL-MCNC: 176 MG/DL
CO2 SERPL-SCNC: 25 MMOL/L (ref 21–32)
CREAT SERPL-MCNC: 1.29 MG/DL (ref 0.55–1.02)
ERYTHROCYTE [DISTWIDTH] IN BLOOD BY AUTOMATED COUNT: 14.5 % (ref 11.5–14.5)
GLOBULIN SER CALC-MCNC: 3 G/DL (ref 2–4)
GLUCOSE SERPL-MCNC: 225 MG/DL (ref 65–100)
HCT VFR BLD AUTO: 36.5 % (ref 35–47)
HDLC SERPL-MCNC: 60 MG/DL
HDLC SERPL: 2.9 (ref 0–5)
HGB BLD-MCNC: 11.5 G/DL (ref 11.5–16)
LDLC SERPL CALC-MCNC: 96 MG/DL (ref 0–100)
MCH RBC QN AUTO: 29.6 PG (ref 26–34)
MCHC RBC AUTO-ENTMCNC: 31.5 G/DL (ref 30–36.5)
MCV RBC AUTO: 93.8 FL (ref 80–99)
NRBC # BLD: 0 K/UL (ref 0–0.01)
NRBC BLD-RTO: 0 PER 100 WBC
PLATELET # BLD AUTO: 267 K/UL (ref 150–400)
PMV BLD AUTO: 9.9 FL (ref 8.9–12.9)
POTASSIUM SERPL-SCNC: 4.4 MMOL/L (ref 3.5–5.1)
PROT SERPL-MCNC: 6.5 G/DL (ref 6.4–8.2)
RBC # BLD AUTO: 3.89 M/UL (ref 3.8–5.2)
SODIUM SERPL-SCNC: 133 MMOL/L (ref 136–145)
TRIGL SERPL-MCNC: 100 MG/DL
TSH SERPL DL<=0.05 MIU/L-ACNC: 2.05 UIU/ML (ref 0.36–3.74)
VLDLC SERPL CALC-MCNC: 20 MG/DL
WBC # BLD AUTO: 7.4 K/UL (ref 3.6–11)

## 2023-11-17 RX ORDER — CALCITONIN SALMON 200 [IU]/.09ML
SPRAY, METERED NASAL
Qty: 3 EACH | Refills: 1 | Status: SHIPPED | OUTPATIENT
Start: 2023-11-17

## 2023-11-17 RX ORDER — ATORVASTATIN CALCIUM 10 MG/1
10 TABLET, FILM COATED ORAL DAILY
Qty: 90 TABLET | Refills: 1 | Status: SHIPPED | OUTPATIENT
Start: 2023-11-17

## 2023-11-17 RX ORDER — HYDROXYZINE HYDROCHLORIDE 25 MG/1
TABLET, FILM COATED ORAL
Qty: 30 TABLET | Refills: 3 | Status: SHIPPED
Start: 2023-11-17

## 2023-11-17 RX ORDER — LEVOTHYROXINE SODIUM 0.05 MG/1
TABLET ORAL
Qty: 90 TABLET | Refills: 1 | Status: SHIPPED | OUTPATIENT
Start: 2023-11-17

## 2023-11-17 RX ORDER — ALPRAZOLAM 0.25 MG/1
0.25 TABLET ORAL 2 TIMES DAILY
Qty: 60 TABLET | Refills: 0 | Status: SHIPPED | OUTPATIENT
Start: 2023-11-17 | End: 2023-12-17

## 2023-11-17 RX ORDER — VIBEGRON 75 MG/1
75 TABLET, FILM COATED ORAL DAILY
COMMUNITY
End: 2023-11-17 | Stop reason: SDUPTHER

## 2023-11-17 RX ORDER — OMEGA-3S/DHA/EPA/FISH OIL/D3 300MG-1000
400 CAPSULE ORAL DAILY
COMMUNITY

## 2023-11-17 RX ORDER — ALPRAZOLAM 0.25 MG/1
1 TABLET ORAL 2 TIMES DAILY
Qty: 60 TABLET | Refills: 0 | COMMUNITY
Start: 2023-11-14 | End: 2023-11-17 | Stop reason: SDUPTHER

## 2023-11-17 RX ORDER — VIBEGRON 75 MG/1
75 TABLET, FILM COATED ORAL DAILY
Qty: 90 TABLET | Refills: 1 | Status: SHIPPED | OUTPATIENT
Start: 2023-11-17

## 2023-11-17 RX ORDER — PREGABALIN 75 MG/1
75 CAPSULE ORAL 3 TIMES DAILY
Qty: 270 CAPSULE | Refills: 1 | Status: SHIPPED | OUTPATIENT
Start: 2023-11-17 | End: 2024-05-15

## 2023-11-17 ASSESSMENT — PATIENT HEALTH QUESTIONNAIRE - PHQ9
SUM OF ALL RESPONSES TO PHQ QUESTIONS 1-9: 1
SUM OF ALL RESPONSES TO PHQ QUESTIONS 1-9: 1
SUM OF ALL RESPONSES TO PHQ9 QUESTIONS 1 & 2: 1
1. LITTLE INTEREST OR PLEASURE IN DOING THINGS: 0
SUM OF ALL RESPONSES TO PHQ QUESTIONS 1-9: 1
2. FEELING DOWN, DEPRESSED OR HOPELESS: 1
SUM OF ALL RESPONSES TO PHQ QUESTIONS 1-9: 1

## 2023-11-17 NOTE — ASSESSMENT & PLAN NOTE
Bilateral high amplitude, low frequency action tremor. Recently seen by Dr. Zbigniew Candelario, neurologist, during hospitalization who noted MRI with generalized atrophy but no findings to explain tremor. Restarted primidone. She no longer wishes to take this medication as she has tried twice without efficacy. Currently using Xanax BID, discussed risk of developing tolerance, drowsiness, fall risk. She has used BID for about 2 years, states she needs them to be able to eat. Will refill at this time and review risks vs benefits and alternative options at next visit. Currently working with OT, states weighted silverware have not helped but using a spork helps.

## 2023-11-17 NOTE — PROGRESS NOTES
[facility] Primary Care at [address] [facility] [address] [facility], [facility], [address] [place] [address] Team Members: Jaimee Batista MD; Collette Savage NP; Raul Tyler NP; Gem Mayfield NP; Sulma Garcia, ELDA; Tahir Kamara, ELDA; Hollie Walker RN; Eliza Joaquin Core  1940 / [id number]  female    Date of Initial Visit (Start of Care): 11/17/23    Diagnoses  Patient Active Problem List   Diagnosis    Irritable bowel syndrome without diarrhea    Hypothyroidism    Spinal stenosis, lumbar    Hyperlipidemia    Personal history of DVT (deep vein thrombosis)    History of rheumatic fever    Age-related osteoporosis without current pathological fracture    Weakness generalized    Essential tremor    Neuropathy    Osteoarthritis of spine with radiculopathy, lumbar region    Bilateral leg weakness    Ambulatory dysfunction    Postherpetic neuralgia    Anxiety with depression       Advance Care Planning:    Code Status: Prior        Primary Decision Maker (Active): Nimesh Weathers - [phone]       11/17/2023     5:28 PM   Demographics   Marital Status        DME/Supplies:  Walker     Allergies   Allergen Reactions    Codeine Nausea Only    Nitrous Oxide Other (See Comments)     Blood pressure bottoms out    Penicillins Other (See Comments)     State PCN does not work. 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 Lightheadedness and dizziness, vertigo. Heart \"beating out of my chest\". \"Inhaled a breath but could not exhale\". Was on a fluoride treatment with dentist. Leif Finch to ER. \"[place] like I was jello\". Wheat Other (See Comments)     Cannot digest wheat per pt. Other Nausea And Vomiting     Artificial sweeteners allergy. Does not use corn syrup in diet.        Nutritional Requirements:   Oral with supported meal preparation    Functional/Activity Level:  Ambulatory with assistance of cane, walker and/or support of another person (significant impairment)    Safety

## 2023-11-17 NOTE — ASSESSMENT & PLAN NOTE
Of right lower extremity, per chart review was in the setting of oral contraceptive use.   Intermittent swelling of BLE, none apparent today, dopplers 2/2022 negative for DVT bilaterally

## 2023-11-17 NOTE — ASSESSMENT & PLAN NOTE
Per DEXA 10/2016 right forearm T score -2.8. She started calcitonin 5/2017, DEXA 2019 with osteopenia. Has not taken bisphosphonate therapy in the past but would be open to this- no clear contraindications (no hx of GERD or varices, bariatric surgery, and GFR >30), but will await prior PCP notes and discuss at next visit.

## 2023-11-17 NOTE — PROGRESS NOTES
[address] [facility]  [id number]      Date of Current Visit: 11/17/2023     SUMMARY     Jamarcus Weathers is a 82 y.o. female seen in the home today due to taxing effort to seek primary care services in the community s/t difficulty ambulating due to DDD with radiculopathy    Patient/Family present for Current Visit:  patient,  Vern Mohs Core  Goals of Care as stated by the patient/family is: improve function, avoid hospitalization and stay in the home     ASSESSMENT AND PLAN     1. Encounter to establish care    2. Hypothyroidism, unspecified type    3. Essential tremor    4. Osteoarthritis of spine with radiculopathy, lumbar region    5. Mixed hyperlipidemia    6. Bilateral leg weakness    7. Personal history of DVT (deep vein thrombosis)    8. Age-related osteoporosis without current pathological fracture    9. Irritable bowel syndrome without diarrhea    10. Anxiety with depression    11. Postherpetic neuralgia    12. Encounter for annual wellness exam in Medicare patient       1. Encounter to establish care  2. Hypothyroidism, unspecified type  Currently managed on Synthroid 50mcg daily. Discussed taking on empty stomach 30 min prior to breakfast and she stated understanding. TSH repeated today  3. Essential tremor  Bilateral high amplitude, low frequency action tremor. Recently seen by Dr. Alina Bolden, neurologist, during hospitalization who noted MRI with generalized atrophy but no findings to explain tremor. Restarted primidone. She no longer wishes to take this medication as she has tried twice without efficacy. Currently using Xanax BID, discussed risk of developing tolerance, drowsiness, fall risk. She has used BID for about 2 years, states she needs them to be able to eat. Will refill at this time and review risks vs benefits and alternative options at next visit. Currently working with OT, states weighted silverware have not helped but using a spork helps.    Orders:  -     ALPRAZolam
Joint home visit with Karyn Salinas NP to establish PCP relationship. Met with patient and  Tristan Brandon. S - I am not having any pain right now,    O - Pt is alert and oriented x 4, fully dressed and up in living room chair, observed stand pivot transfer to wheelchair which was slow and steady. Appetite good for 3 meals of regular diet. Pt and  and receiving meals-on-wheels. Pt with frequent urinary incontinence wearing pull ups. She is continent of bowel and BMs are 2 - 3 times each week which is her normal pattern. Venipuncture x 1 in right antecubital vein. Blood sample obtained for CBC, CMP, TSH and Lipid panel. Pt tolerated procedure well. Labs were taken to [facility]. Patient is currently open to [facility] for SN, PT and OT. Encouraged pt to speak with her OT about an adaptive spork with a built up handle as she has trouble self feeding with utensil due to essential tremor. A - /70 P 78 R 16 Pulse ox 94%. Pt evidenced essential tremor left hand during this visit, she states tremor alternates hands. Pt would like to have a flu shot given in the home. P - Notified [address] [facility] (spoke to Adam Mcneill) of new PCP relationship and that we will sign future HH orders, placed IVNA order for flu shot.   Med refills pended to provider
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 Lightheadedness and dizziness, vertigo. Heart \"beating out of my chest\". \"Inhaled a breath but could not exhale\". Was on a fluoride treatment with dentist. Nestor Rivero to ER. \"Pride like I was jello\". Wheat Other (See Comments)     Cannot digest wheat per pt. Other Nausea And Vomiting     Artificial sweeteners allergy. Does not use corn syrup in diet.        Family History   Problem Relation Age of Onset    Other Father         prostate surgery/hallucination with pain meds (too much per pt)/tremors    Headache Maternal Grandmother     Kidney Disease Maternal Grandmother     Heart Disease Maternal Grandfather     Heart Disease Paternal Grandmother     Other Paternal Grandmother         overweight    Heart Attack Paternal Grandfather     Breast Cancer Mother         42's    Other Brother         brain aneurysm/blood clot in leg/polio    Cancer Brother         lung    Migraines Mother     Cancer Mother         breast - mets to spinal cord and brain    Stroke Father         mini    Dementia Father      Social History     Tobacco Use    Smoking status: Every Day    Smokeless tobacco: Never    Tobacco comments:     Quit smokin-5 cigs per day   Substance Use Topics    Alcohol use: No       KANCHAN Strong - NP

## 2023-11-17 NOTE — ASSESSMENT & PLAN NOTE
Endorses history of \"spastic colon\" with cramping and abdominal pain. Currently without pain, reports she has about 2 BM per week which are soft and formed, eating bananas which help with regularity.

## 2023-11-17 NOTE — ASSESSMENT & PLAN NOTE
Currently managed on Synthroid 50mcg daily. Discussed taking on empty stomach 30 min prior to breakfast and she stated understanding.  TSH repeated today

## 2023-11-17 NOTE — ASSESSMENT & PLAN NOTE
Recent hospitalizations 9/2023, 10/2023 for worsening leg weakness and inability to ambulate. Seen by neurologist Dr. Kitty Temple during hospitalization who noted etiology most likely multifactorial with lumbar degenerative spine disease, arthristis in her knees, peripheral neuropathy, associated with her tremor. Lumbar spine MRI does reveal multilevel degenerative disease. EMG with no evidence of large fiber neuropathy, most likely small fiber component. Recommended outpatient more comprehensive EMG and course of rehab.   She was d/c to inpatient rehab and can now transfer slowly, continues working with PT/OT

## 2023-11-17 NOTE — ASSESSMENT & PLAN NOTE
Hx of shingles with lasting neuropathic pain right chest/under right breast.  She describes pain as \"like a campfire\" that goes \"right down the middle of me. \"  Pain is well-controlled with Lyrica 75mg TID. Per patient and chart review, Lyrica was reduced to 50mg TID while in hospital due to CrCl, but she had severe pain with this reduction and has since resumed her previous dose. BMP repeated today.

## 2023-11-17 NOTE — ASSESSMENT & PLAN NOTE
MRI of spine 10/2023 significant for multilevel spondylosis worst at L4-L5 region. EMG 9/2023 significant for chronic lumbar radiculopathy.   She has significant physical limitations with recent hospital 2/2 functional decline, discharged to inpatient rehab and is now working with PT/OT through home health

## 2023-11-17 NOTE — ASSESSMENT & PLAN NOTE
Patient endorses periods of stress and episodes of feeling isolated and depressed. She reports prior to her September hospitalization, she was alone due to her 's health issues and her close friend was dealing with personal issues. She became depressed which contributed to her functional decline. Reports mood is generally good today, she is able to find activities that bring her melanie. Mood does not interfere with appetite but does occasionally worsen sleep. Denies SI/HI.   Discussed team SW available for support if needed

## 2023-11-21 ENCOUNTER — TELEPHONE (OUTPATIENT)
Facility: CLINIC | Age: 83
End: 2023-11-21

## 2023-11-21 RX ORDER — HYDROXYZINE HYDROCHLORIDE 25 MG/1
TABLET, FILM COATED ORAL
Qty: 30 TABLET | Refills: 3 | Status: SHIPPED | OUTPATIENT
Start: 2023-11-21

## 2023-11-21 NOTE — TELEPHONE ENCOUNTER
Received call from [address] [place], [place] [address]  requesting Hydroxyzine and Calcitonin refills. To be called in to [address]

## 2023-11-21 NOTE — TELEPHONE ENCOUNTER
Telephone call returned to Hamlet Bañuelos to advise that Calcitonin refill was received by [address] [facility] on 11/17 but the Hydroxyzine refill sent that same day received an error message. Advised that we just resent Hydroxyzine and it should also be at [address]

## 2023-11-28 ENCOUNTER — CLINICAL DOCUMENTATION (OUTPATIENT)
Facility: CLINIC | Age: 83
End: 2023-11-28

## 2023-11-28 NOTE — PROGRESS NOTES
[facility] at [address] [facility] [address] [facility], [address] [facility], [address] [facility]    [address] [facility] Team Members: Selvin Alexander MD; Eva Spencer NP; Holden Bautista NP; Edward Martinez NP; Maria M Garcia, ELDA; Ysidro Skiff, RN; Hollie Walker RN; Cecilia Weathers  1940 / [id number]  female    Date of Initial Visit (Start of Care): 11/17/23    Diagnoses  Patient Active Problem List   Diagnosis    Irritable bowel syndrome without diarrhea    Hypothyroidism    Spinal stenosis, lumbar    Hyperlipidemia    Personal history of DVT (deep vein thrombosis)    History of rheumatic fever    Age-related osteoporosis without current pathological fracture    Weakness generalized    Essential tremor    Neuropathy    Osteoarthritis of spine with radiculopathy, lumbar region    Bilateral leg weakness    Ambulatory dysfunction    Postherpetic neuralgia    Anxiety with depression       Advance Care Planning:    Code Status: Prior        Primary Decision Maker (Active): Nimesh Weathers - [phone]       11/28/2023     9:55 AM   Demographics   Marital Status        DME/Supplies:  Walker     Allergies   Allergen Reactions    Codeine Nausea Only    Nitrous Oxide Other (See Comments)     Blood pressure bottoms out    Penicillins Other (See Comments)     State PCN does not work. 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 Lightheadedness and dizziness, vertigo. Heart \"beating out of my chest\". \"Inhaled a breath but could not exhale\". Was on a fluoride treatment with dentist. Hyla Candle to ER. \"Felt like I was jello\". Wheat Other (See Comments)     Cannot digest wheat per pt. Other Nausea And Vomiting     Artificial sweeteners allergy. Does not use corn syrup in diet.        Nutritional Requirements:   Oral with supported meal preparation    Functional/Activity Level:  Ambulatory with assistance of cane, walker and/or support of another person (significant impairment)    Safety

## 2023-11-30 ENCOUNTER — TELEPHONE (OUTPATIENT)
Facility: CLINIC | Age: 83
End: 2023-11-30

## 2023-11-30 ENCOUNTER — TELEPHONE (OUTPATIENT)
Age: 83
End: 2023-11-30

## 2023-11-30 NOTE — TELEPHONE ENCOUNTER
Per  patient needs to be scheduled for a hospital follow up appointment,  Patient called 10/24/23, 11/30/23 left voicemail for patient to return my call    Please schedule patient accordingly upon return call

## 2023-11-30 NOTE — TELEPHONE ENCOUNTER
Prescriptions and Pharmacy - Lissy Staton Core is the patient's spouse. He called to get clarification on the patient's prescription refills. He states that the patient is not enrolled in the [address] [facility] program.  He says that he thought he completed the form. Lissy Staton also said that the patient does not want the medication \"blister packs\".     Lissy Staton requests a callback at [phone]

## 2023-12-01 NOTE — TELEPHONE ENCOUNTER
Telephone call returned to  and patient, advised that this nurse spoke with [address] to confirm all of patient's current medications. Advised that Alprazolam will not be placed on their automatic refill program as it is a scheduled drug and will only be filled for 30 day supply when she has 3 -5 pills left and not sooner.   Pt verbalized understanding

## 2023-12-05 ENCOUNTER — TELEPHONE (OUTPATIENT)
Facility: CLINIC | Age: 83
End: 2023-12-05

## 2023-12-05 NOTE — TELEPHONE ENCOUNTER
Telephone call from [address] [facility] with [facility] requesting verbal order to continue to see patient 1 x week for 8 weeks. Verbal order provided for same.

## 2023-12-06 ENCOUNTER — TELEPHONE (OUTPATIENT)
Facility: CLINIC | Age: 83
End: 2023-12-06

## 2023-12-06 NOTE — TELEPHONE ENCOUNTER
Cielo Han is a PT with [facility]. He called to update Mary Hanna NP that the patient was seen today for a PT re-certification visit.   He would like to see the patient:    1 time per week for 8 weeks    Cielo Han asks for a callback at [phone] to confirm with the NP

## 2023-12-07 NOTE — TELEPHONE ENCOUNTER
Telephone call returned to Rene Gonzalez PT with [facility]. Verbal order given for PT 1 x wk for 8 weeks.

## 2023-12-11 DIAGNOSIS — G25.0 ESSENTIAL TREMOR: ICD-10-CM

## 2023-12-11 DIAGNOSIS — F41.8 ANXIETY WITH DEPRESSION: ICD-10-CM

## 2023-12-11 NOTE — TELEPHONE ENCOUNTER
Telephone call from [place] with [address] requesting refill for patient's Xanax prescription

## 2023-12-11 NOTE — TELEPHONE ENCOUNTER
Triage for Controlled Substance Refill Request    Pain Diagnosis: Essential Tremor    Last Outpatient Visit: 11/17/23    Next Outpatient Visit: 12/21/21    Pharmacy: [address]    Medication: Xanax  Dose and directions: 0.25 mg 1 tablet BID  Number dispensed:  60  Date filled ():  11/14/23  Reina Ponce MD)     reviewed: yes    Date of Urine Drug Screen:  to be done at next visit    Opioid Safety Handout given:  yes    Appropriate for refill:  yes    Action:  pended to provider Molly Babin NP

## 2023-12-12 ENCOUNTER — CLINICAL DOCUMENTATION (OUTPATIENT)
Facility: CLINIC | Age: 83
End: 2023-12-12

## 2023-12-12 ENCOUNTER — TELEPHONE (OUTPATIENT)
Facility: CLINIC | Age: 83
End: 2023-12-12

## 2023-12-12 RX ORDER — ALPRAZOLAM 0.25 MG/1
0.25 TABLET ORAL 2 TIMES DAILY
Qty: 60 TABLET | Refills: 2 | Status: SHIPPED | OUTPATIENT
Start: 2023-12-12 | End: 2024-03-11

## 2023-12-12 NOTE — TELEPHONE ENCOUNTER
Team nurse triage note reviewed and PDMP reviewed. Plans to collect UDS next visit, will refill at this time.

## 2023-12-12 NOTE — PROGRESS NOTES
Home Health Certification approval     Initial certification: 60/82/60     Certification period: 12/9/23-2/6/24     [facility]: [id number]     Company: [email or web address]      Diagnosis code:  M43.06 Spondylosis of lumbar region  G62.9 Polyneuropathy  I12.9 Hypertensive chronic kidney disease  N18.30 CKD Stage 3  F42.9 Anxiety disorder  G89.29 Chronic pain     I have reviewed and agree with plan of care.   SN: once weekly for 9 weeks with 2 PRN visits for falls    KANCHAN Tavares NP

## 2023-12-12 NOTE — TELEPHONE ENCOUNTER
Telephone call from pt asking about refill for Alprazolam and Hydroxyzine. Advised that provider Jessica Miller NP sent in refill for Alprazolam this morning at 9:31 and she should check with [address] Reviewed MAR to determine that hydroxyzine rx was sent in November with 2 refills so advised that she should call [address] to request that refill.

## 2023-12-14 ENCOUNTER — TELEPHONE (OUTPATIENT)
Facility: CLINIC | Age: 83
End: 2023-12-14

## 2023-12-14 NOTE — TELEPHONE ENCOUNTER
Called patient to confirm their in home visit with Alie Chin on 12/21/23, arrival between 9-1. Spoke with patient, they confirmed the appointment and answered the covid screen questions.  Does anyone in the household have covid no  Have there been any changes to insurance no or location no

## 2023-12-27 ENCOUNTER — TELEPHONE (OUTPATIENT)
Facility: CLINIC | Age: 83
End: 2023-12-27

## 2023-12-27 NOTE — TELEPHONE ENCOUNTER
Patient called stating that the ointment for UTI prescribed by the doctor has worked and was told once that was finished to get an ointment OTC however those did not work for her and would like Jazz Han to write a Rx for an ointment.  Patient is also requesting a refill of her Atarax 25 mg

## 2023-12-27 NOTE — TELEPHONE ENCOUNTER
Telephone call to patient for more information regarding her earlier call to our office. Inquired what symptoms of UTI is she currently having - she reports none at present. This ointment she has referenced for UTIs sounds like it might have been used for vaginal burning after patient had a reaction to a Purewick while in the hospital.  She kept repeating that she wanted provider to send in a rx for a [employer] UTI Infection ointment. Advised pt that this nurse will attempt to research and call her back with info on what over the counter product she can use. Advised pt that Atarax rx that was sent in Nov has 2 remaining refills and she should call her pharmacy to refill. Pt also requesting rx for Systane Original Eye drops - advised that this product is over-the-counter and does not require rx. Telephone call to patient, advised that she can purchase OTC Miconazole Vaginal Cream to treat burning of vaginal area. Advised that this should not be used continuously but as needed when burning or vaginal discharge are present. She was unable to write down the name of medicine due to hand tremors.   Spoke to  Gigi Khoury who was able to write down and read back Miconazole Vaginal Cream.

## 2023-12-29 ENCOUNTER — TELEPHONE (OUTPATIENT)
Age: 83
End: 2023-12-29

## 2023-12-29 NOTE — TELEPHONE ENCOUNTER
Call returned to [address] [place] [address] Drive was not available to speak with this nurse. I provided my information, patient's name and my phone for a call back.

## 2023-12-29 NOTE — TELEPHONE ENCOUNTER
Call back received from [address] - I informed her that I received her message, and the provider had attempted to address this issue with patient's , and he denied that they have an infestation. [address] responded that they definitely do, their clinicians all have been bitten. She stated that patient's  has been notified, and given the 2 weeks notice. Family would need to provide proof that the issue has been professionally addressed prior to [facility] resume of care. I asked if there was any other steps [facility] needs to take, and she responded, that we did not.

## 2023-12-29 NOTE — TELEPHONE ENCOUNTER
Regina Frausto (:  1995) is a 23 y.o. female,Established patient, here for evaluation of the following chief complaint(s): Menstrual Problem      ASSESSMENT/PLAN:  1. Irregular periods/menstrual cycles  2. Birth control counseling    She would like to have IUD replaced, but she would like oral contraceptive for one month and have IUD placed next month she does not like chc, will give samples of POP- slynd, she has her first pap scheduled on 20 and her pelvic US she still has labs to complete from previous appointment. Discussed and counseled on all forms hormonal contraceptives and she would like to have IUD placed. Discussed R/B/A with IUD. Cultures delcined  Abstain sexual intercourse until IUD placement, if 2 weeks can have urine hcg in office if not needs serum hcg day prior to placement. encouraged to return to office on menses for IUD placement   Cytotec 200mg night before and morning of placement of IUD. Return in about 19 days (around 2021) for annual exam.    SUBJECTIVE/OBJECTIVE:  BLANK Olverayesenia Perez would like to discuss hormonal contraceptives for history of irregular periods. She was seen here on 21 for complaint of irregular periods. She had IUD removed in 2020 and had few days spotting in November and then in   she was seen in  then  had heavy bleeding where she was passing clots and tissue, she states had + preg test at home but she had gone to ER and was told hcg was 3 an bled for 4 days at that time, ? LH surge. Denies dyspareunia. She denies vaginal d/c odor or itching. She denies new medications/supplemenst, significant weight changes, significant stressors, fatigue, hair loss, palpitations, hair loss, galactorrhea,  easy bruising/bleeding. Denies hirsutism admits to cystic acne. 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 The home must be cleaned out and certified before clinicians can come out to the home again. Order has been put on a 2 week hold. Rico Banks states that [facility] may cover the cost, but not sure.  # [phone] HX previous contraception usage IUd- removed in October 2020 (states felt more pelvic pain with IUD and irregular bleeding- but always felt like not in place right ), nuva ring( felt emotional on that)  Menses age 10  Sexually active: same partner for 1.5 years  Last PAP: never had  Hx of STI denies        She reports there is a personal history or family history of:    Smoking (> 15 cigs/day): she states uses vape and it is ess than 15 cig a day uses rarely.     Migraine with Aura: no    HTN (> 160/100):  no    DVT:  no    Thrombophilias:  no    Stroke (CVA): no    Ischemic heart disease:  no    Valvular heart disease (A Fib, Pul HTN, etc): no    Positive Antiphospholipid Abs:  no    Liver Disease:  no      Review of Systems   Constitutional: Negative for chills and fever. Respiratory: Negative for shortness of breath and wheezing. Cardiovascular: Negative for chest pain, palpitations and leg swelling. Gastrointestinal: Negative for nausea and vomiting. Genitourinary: Positive for menstrual problem. Negative for dyspareunia, vaginal bleeding, vaginal discharge and vaginal pain. Neurological: Negative for dizziness and headaches. Hematological: Negative for adenopathy. Does not bruise/bleed easily. No flowsheet data found.      Physical Exam    [INSTRUCTIONS:  \"[x]\" Indicates a positive item  \"[]\" Indicates a negative item  -- DELETE ALL ITEMS NOT EXAMINED]    Constitutional: [x] Appears well-developed and well-nourished [x] No apparent distress      [] Abnormal -     Mental status: [x] Alert and awake  [x] Oriented to person/place/time [x] Able to follow commands    [] Abnormal -     Eyes:   EOM    [x]  Normal    [] Abnormal -   Sclera  [x]  Normal    [] Abnormal -          Discharge [x]  None visible   [] Abnormal -     HENT: [x] Normocephalic, atraumatic  [] Abnormal -   [x] Mouth/Throat: Mucous membranes are moist    External Ears [x] Normal  [] Abnormal - Neck: [x] No visualized mass [] Abnormal -     Pulmonary/Chest: [x] Respiratory effort normal   [x] No visualized signs of difficulty breathing or respiratory distress        [] Abnormal -      Musculoskeletal:   [] Normal gait with no signs of ataxia         [x] Normal range of motion of neck        [] Abnormal -     Neurological:        [x] No Facial Asymmetry (Cranial nerve 7 motor function) (limited exam due to video visit)          [x] No gaze palsy        [] Abnormal -          Skin:        [x] No significant exanthematous lesions or discoloration noted on facial skin         [] Abnormal -            Psychiatric:       [x] Normal Affect [] Abnormal -        [x] No Hallucinations    Other pertinent observable physical exam findings:-          On this date 01/21/21 I have spent 20 minutes reviewing previous notes, test results and face to face (virtual) with the patient discussing the diagnosis and importance of compliance with the treatment plan as well as documenting on the day of the visit. Karolyn Lobo is a 23 y.o. female being evaluated by a Virtual Visit (video visit) encounter to address concerns as mentioned above. A caregiver was present when appropriate. Due to this being a TeleHealth encounter (During YTSEC-04 public health emergency), evaluation of the following organ systems was limited: Vitals/Constitutional/EENT/Resp/CV/GI//MS/Neuro/Skin/Heme-Lymph-Imm. Pursuant to the emergency declaration under the [address] [place] [address] and the [employer] Act, this Virtual Visit was conducted with patient's (and/or legal guardian's) consent, to reduce the patient's risk of exposure to COVID-19 and provide necessary medical care. The patient (and/or legal guardian) has also been advised to contact this office for worsening conditions or problems, and seek emergency medical treatment and/or call 911 if deemed necessary. Patient identification was verified at the start of the visit: Yes    Services were provided through a video synchronous discussion virtually to substitute for in-person clinic visit. Patient was located at home and provider was located in office or at home. An electronic signature was used to authenticate this note.     --CALE Howard - CNP

## 2024-01-01 ENCOUNTER — TELEPHONE (OUTPATIENT)
Facility: CLINIC | Age: 84
End: 2024-01-01

## 2024-01-01 ENCOUNTER — HOSPICE ADMISSION (OUTPATIENT)
Age: 84
End: 2024-01-01
Payer: MEDICARE

## 2024-01-01 PROCEDURE — 0656 HSPC GENERAL INPATIENT

## 2024-01-13 ENCOUNTER — HOSPITAL ENCOUNTER (INPATIENT)
Facility: HOSPITAL | Age: 84
LOS: 5 days | Discharge: NURSING, REHAB OR LONG-TERM CARE | DRG: 598 | End: 2024-01-18
Attending: STUDENT IN AN ORGANIZED HEALTH CARE EDUCATION/TRAINING PROGRAM | Admitting: STUDENT IN AN ORGANIZED HEALTH CARE EDUCATION/TRAINING PROGRAM
Payer: MEDICARE

## 2024-01-13 ENCOUNTER — APPOINTMENT (OUTPATIENT)
Facility: HOSPITAL | Age: 84
DRG: 598 | End: 2024-01-13
Payer: MEDICARE

## 2024-01-13 ENCOUNTER — TELEPHONE (OUTPATIENT)
Age: 84
End: 2024-01-13

## 2024-01-13 DIAGNOSIS — R53.0 NEOPLASTIC MALIGNANT RELATED FATIGUE: ICD-10-CM

## 2024-01-13 DIAGNOSIS — E86.0 DEHYDRATION: ICD-10-CM

## 2024-01-13 DIAGNOSIS — M47.26 OSTEOARTHRITIS OF SPINE WITH RADICULOPATHY, LUMBAR REGION: ICD-10-CM

## 2024-01-13 DIAGNOSIS — N63.10 MASS OF RIGHT BREAST, UNSPECIFIED QUADRANT: Primary | ICD-10-CM

## 2024-01-13 DIAGNOSIS — R53.1 GENERALIZED WEAKNESS: ICD-10-CM

## 2024-01-13 DIAGNOSIS — N30.00 ACUTE CYSTITIS WITHOUT HEMATURIA: ICD-10-CM

## 2024-01-13 DIAGNOSIS — N17.9 AKI (ACUTE KIDNEY INJURY) (HCC): ICD-10-CM

## 2024-01-13 DIAGNOSIS — C79.51 CARCINOMA OF RIGHT BREAST METASTATIC TO BONE (HCC): ICD-10-CM

## 2024-01-13 DIAGNOSIS — C50.911 CARCINOMA OF RIGHT BREAST METASTATIC TO BONE (HCC): ICD-10-CM

## 2024-01-13 DIAGNOSIS — E87.1 HYPONATREMIA: ICD-10-CM

## 2024-01-13 PROBLEM — N63.0 BREAST MASS: Status: ACTIVE | Noted: 2024-01-13

## 2024-01-13 LAB
ALBUMIN SERPL-MCNC: 3.2 G/DL (ref 3.5–5)
ALBUMIN/GLOB SERPL: 0.8 (ref 1.1–2.2)
ALP SERPL-CCNC: 112 U/L (ref 45–117)
ALT SERPL-CCNC: 18 U/L (ref 12–78)
ANION GAP SERPL CALC-SCNC: 3 MMOL/L (ref 5–15)
APPEARANCE UR: CLEAR
AST SERPL-CCNC: 26 U/L (ref 15–37)
BACTERIA URNS QL MICRO: NEGATIVE /HPF
BASOPHILS # BLD: 0 K/UL (ref 0–0.1)
BASOPHILS NFR BLD: 0 % (ref 0–1)
BILIRUB SERPL-MCNC: 0.3 MG/DL (ref 0.2–1)
BILIRUB UR QL: NEGATIVE
BUN SERPL-MCNC: 32 MG/DL (ref 6–20)
BUN/CREAT SERPL: 22 (ref 12–20)
CALCIUM SERPL-MCNC: 9.5 MG/DL (ref 8.5–10.1)
CHLORIDE SERPL-SCNC: 105 MMOL/L (ref 97–108)
CO2 SERPL-SCNC: 27 MMOL/L (ref 21–32)
COLOR UR: ABNORMAL
CREAT SERPL-MCNC: 1.43 MG/DL (ref 0.55–1.02)
DIFFERENTIAL METHOD BLD: ABNORMAL
EOSINOPHIL # BLD: 0 K/UL (ref 0–0.4)
EOSINOPHIL NFR BLD: 0 % (ref 0–7)
EPITH CASTS URNS QL MICRO: ABNORMAL /LPF
ERYTHROCYTE [DISTWIDTH] IN BLOOD BY AUTOMATED COUNT: 14.2 % (ref 11.5–14.5)
FLUAV AG NPH QL IA: NEGATIVE
FLUBV AG NOSE QL IA: NEGATIVE
GLOBULIN SER CALC-MCNC: 3.8 G/DL (ref 2–4)
GLUCOSE SERPL-MCNC: 92 MG/DL (ref 65–100)
GLUCOSE UR STRIP.AUTO-MCNC: NEGATIVE MG/DL
HCT VFR BLD AUTO: 35.7 % (ref 35–47)
HGB BLD-MCNC: 11.6 G/DL (ref 11.5–16)
HGB UR QL STRIP: ABNORMAL
IMM GRANULOCYTES # BLD AUTO: 0.1 K/UL (ref 0–0.04)
IMM GRANULOCYTES NFR BLD AUTO: 1 % (ref 0–0.5)
KETONES UR QL STRIP.AUTO: NEGATIVE MG/DL
LEUKOCYTE ESTERASE UR QL STRIP.AUTO: ABNORMAL
LYMPHOCYTES # BLD: 1.1 K/UL (ref 0.8–3.5)
LYMPHOCYTES NFR BLD: 10 % (ref 12–49)
MCH RBC QN AUTO: 29.7 PG (ref 26–34)
MCHC RBC AUTO-ENTMCNC: 32.5 G/DL (ref 30–36.5)
MCV RBC AUTO: 91.3 FL (ref 80–99)
MONOCYTES # BLD: 1 K/UL (ref 0–1)
MONOCYTES NFR BLD: 9 % (ref 5–13)
NEUTS SEG # BLD: 8.6 K/UL (ref 1.8–8)
NEUTS SEG NFR BLD: 80 % (ref 32–75)
NITRITE UR QL STRIP.AUTO: NEGATIVE
NRBC # BLD: 0 K/UL (ref 0–0.01)
NRBC BLD-RTO: 0 PER 100 WBC
PH UR STRIP: 5 (ref 5–8)
PLATELET # BLD AUTO: 317 K/UL (ref 150–400)
PMV BLD AUTO: 9.9 FL (ref 8.9–12.9)
POTASSIUM SERPL-SCNC: 4.7 MMOL/L (ref 3.5–5.1)
PROT SERPL-MCNC: 7 G/DL (ref 6.4–8.2)
PROT UR STRIP-MCNC: NEGATIVE MG/DL
RBC # BLD AUTO: 3.91 M/UL (ref 3.8–5.2)
RBC #/AREA URNS HPF: ABNORMAL /HPF (ref 0–5)
RBC MORPH BLD: ABNORMAL
SARS-COV-2 RDRP RESP QL NAA+PROBE: NOT DETECTED
SODIUM SERPL-SCNC: 135 MMOL/L (ref 136–145)
SOURCE: NORMAL
SP GR UR REFRACTOMETRY: 1.01
TROPONIN I SERPL HS-MCNC: 10 NG/L (ref 0–51)
URINE CULTURE IF INDICATED: ABNORMAL
UROBILINOGEN UR QL STRIP.AUTO: 0.2 EU/DL (ref 0.2–1)
WBC # BLD AUTO: 10.8 K/UL (ref 3.6–11)
WBC URNS QL MICRO: ABNORMAL /HPF (ref 0–4)

## 2024-01-13 PROCEDURE — 2580000003 HC RX 258

## 2024-01-13 PROCEDURE — 85025 COMPLETE CBC W/AUTO DIFF WBC: CPT

## 2024-01-13 PROCEDURE — 93005 ELECTROCARDIOGRAM TRACING: CPT

## 2024-01-13 PROCEDURE — 80053 COMPREHEN METABOLIC PANEL: CPT

## 2024-01-13 PROCEDURE — 71045 X-RAY EXAM CHEST 1 VIEW: CPT

## 2024-01-13 PROCEDURE — 84484 ASSAY OF TROPONIN QUANT: CPT

## 2024-01-13 PROCEDURE — 6360000004 HC RX CONTRAST MEDICATION

## 2024-01-13 PROCEDURE — 87086 URINE CULTURE/COLONY COUNT: CPT

## 2024-01-13 PROCEDURE — 1100000000 HC RM PRIVATE

## 2024-01-13 PROCEDURE — 99285 EMERGENCY DEPT VISIT HI MDM: CPT

## 2024-01-13 PROCEDURE — 81001 URINALYSIS AUTO W/SCOPE: CPT

## 2024-01-13 PROCEDURE — 87804 INFLUENZA ASSAY W/OPTIC: CPT

## 2024-01-13 PROCEDURE — 71260 CT THORAX DX C+: CPT

## 2024-01-13 PROCEDURE — 87635 SARS-COV-2 COVID-19 AMP PRB: CPT

## 2024-01-13 PROCEDURE — 36415 COLL VENOUS BLD VENIPUNCTURE: CPT

## 2024-01-13 RX ORDER — AMOXICILLIN AND CLAVULANATE POTASSIUM 875; 125 MG/1; MG/1
1 TABLET, FILM COATED ORAL 2 TIMES DAILY
Qty: 20 TABLET | Refills: 0 | Status: ON HOLD | OUTPATIENT
Start: 2024-01-13 | End: 2024-01-23

## 2024-01-13 RX ORDER — 0.9 % SODIUM CHLORIDE 0.9 %
1000 INTRAVENOUS SOLUTION INTRAVENOUS ONCE
Status: COMPLETED | OUTPATIENT
Start: 2024-01-13 | End: 2024-01-14

## 2024-01-13 RX ADMIN — SODIUM CHLORIDE 1000 ML: 9 INJECTION, SOLUTION INTRAVENOUS at 22:20

## 2024-01-13 RX ADMIN — IOPAMIDOL 80 ML: 755 INJECTION, SOLUTION INTRAVENOUS at 21:06

## 2024-01-13 ASSESSMENT — PAIN SCALES - GENERAL: PAINLEVEL_OUTOF10: 0

## 2024-01-13 ASSESSMENT — LIFESTYLE VARIABLES
HOW OFTEN DO YOU HAVE A DRINK CONTAINING ALCOHOL: NEVER
HOW MANY STANDARD DRINKS CONTAINING ALCOHOL DO YOU HAVE ON A TYPICAL DAY: PATIENT DOES NOT DRINK

## 2024-01-13 NOTE — ED NOTES
Assumed care of pt at this time. Pt arrives with reports of not being able to make it to the bathroom d/t leg weakness. EMS visited residence twice to help to bathroom. Pt on monitor x3 with call bell in reach.

## 2024-01-13 NOTE — TELEPHONE ENCOUNTER
Patient called on-call provider to report worsening dental pain/swelling over the past 48 hours to a tooth that had dental work done on it several years ago. No longer follows with dental. Reports that it is difficult to chew and painful. Tramadol and ibuprofen working well for pain. Asking for an antibiotic. Discussed that we are not a dental service so I can send Rx but would recommend dental evaluation if symptoms persist. I asked about allergies and she denies PCN allergy. Will start augmentin BID x 10 days.    Prior to writing this note,  called on-call provider < 1 hour later. He states that EMS just came to help the patient out of the shower and back into her chair. He reports that she has become weaker over the past few days but did not want to go back to the hospital. I explained that there was limited evaluation I could do over the phone about this situation but also provided reassurance that this is quite unlikely to be related to dental infection, which was what he/she believed. Explained that if symptoms persist and/or worsen, would recommend ER for evaluation. He verbalized understanding.   KANCHAN Johnson - NP

## 2024-01-14 ENCOUNTER — APPOINTMENT (OUTPATIENT)
Facility: HOSPITAL | Age: 84
DRG: 598 | End: 2024-01-14
Payer: MEDICARE

## 2024-01-14 LAB
ANION GAP SERPL CALC-SCNC: 4 MMOL/L (ref 5–15)
BASOPHILS # BLD: 0 K/UL (ref 0–0.1)
BASOPHILS NFR BLD: 0 % (ref 0–1)
BUN SERPL-MCNC: 23 MG/DL (ref 6–20)
BUN/CREAT SERPL: 21 (ref 12–20)
CALCIUM SERPL-MCNC: 9.1 MG/DL (ref 8.5–10.1)
CEA SERPL-MCNC: 8.1 NG/ML
CHLORIDE SERPL-SCNC: 112 MMOL/L (ref 97–108)
CO2 SERPL-SCNC: 26 MMOL/L (ref 21–32)
CREAT SERPL-MCNC: 1.11 MG/DL (ref 0.55–1.02)
DIFFERENTIAL METHOD BLD: ABNORMAL
EKG ATRIAL RATE: 74 BPM
EKG DIAGNOSIS: NORMAL
EKG P AXIS: 51 DEGREES
EKG P-R INTERVAL: 196 MS
EKG Q-T INTERVAL: 372 MS
EKG QRS DURATION: 82 MS
EKG QTC CALCULATION (BAZETT): 412 MS
EKG R AXIS: 5 DEGREES
EKG T AXIS: 42 DEGREES
EKG VENTRICULAR RATE: 74 BPM
EOSINOPHIL # BLD: 0 K/UL (ref 0–0.4)
EOSINOPHIL NFR BLD: 0 % (ref 0–7)
ERYTHROCYTE [DISTWIDTH] IN BLOOD BY AUTOMATED COUNT: 14 % (ref 11.5–14.5)
GLUCOSE SERPL-MCNC: 73 MG/DL (ref 65–100)
HCT VFR BLD AUTO: 33.8 % (ref 35–47)
HGB BLD-MCNC: 10.9 G/DL (ref 11.5–16)
IMM GRANULOCYTES # BLD AUTO: 0 K/UL (ref 0–0.04)
IMM GRANULOCYTES NFR BLD AUTO: 0 % (ref 0–0.5)
LYMPHOCYTES # BLD: 1.3 K/UL (ref 0.8–3.5)
LYMPHOCYTES NFR BLD: 14 % (ref 12–49)
MCH RBC QN AUTO: 29.7 PG (ref 26–34)
MCHC RBC AUTO-ENTMCNC: 32.2 G/DL (ref 30–36.5)
MCV RBC AUTO: 92.1 FL (ref 80–99)
MONOCYTES # BLD: 1.1 K/UL (ref 0–1)
MONOCYTES NFR BLD: 11 % (ref 5–13)
NEUTS SEG # BLD: 7.1 K/UL (ref 1.8–8)
NEUTS SEG NFR BLD: 74 % (ref 32–75)
NRBC # BLD: 0 K/UL (ref 0–0.01)
NRBC BLD-RTO: 0 PER 100 WBC
PLATELET # BLD AUTO: 315 K/UL (ref 150–400)
PMV BLD AUTO: 9.6 FL (ref 8.9–12.9)
POTASSIUM SERPL-SCNC: 4 MMOL/L (ref 3.5–5.1)
RBC # BLD AUTO: 3.67 M/UL (ref 3.8–5.2)
SODIUM SERPL-SCNC: 142 MMOL/L (ref 136–145)
WBC # BLD AUTO: 9.5 K/UL (ref 3.6–11)

## 2024-01-14 PROCEDURE — 1100000003 HC PRIVATE W/ TELEMETRY

## 2024-01-14 PROCEDURE — 97535 SELF CARE MNGMENT TRAINING: CPT

## 2024-01-14 PROCEDURE — 6370000000 HC RX 637 (ALT 250 FOR IP): Performed by: STUDENT IN AN ORGANIZED HEALTH CARE EDUCATION/TRAINING PROGRAM

## 2024-01-14 PROCEDURE — 82378 CARCINOEMBRYONIC ANTIGEN: CPT

## 2024-01-14 PROCEDURE — 74176 CT ABD & PELVIS W/O CONTRAST: CPT

## 2024-01-14 PROCEDURE — 51798 US URINE CAPACITY MEASURE: CPT

## 2024-01-14 PROCEDURE — 51702 INSERT TEMP BLADDER CATH: CPT

## 2024-01-14 PROCEDURE — 97161 PT EVAL LOW COMPLEX 20 MIN: CPT

## 2024-01-14 PROCEDURE — 6370000000 HC RX 637 (ALT 250 FOR IP): Performed by: NURSE PRACTITIONER

## 2024-01-14 PROCEDURE — 86300 IMMUNOASSAY TUMOR CA 15-3: CPT

## 2024-01-14 PROCEDURE — 2500000003 HC RX 250 WO HCPCS: Performed by: NURSE PRACTITIONER

## 2024-01-14 PROCEDURE — 2580000003 HC RX 258: Performed by: STUDENT IN AN ORGANIZED HEALTH CARE EDUCATION/TRAINING PROGRAM

## 2024-01-14 PROCEDURE — 97530 THERAPEUTIC ACTIVITIES: CPT

## 2024-01-14 PROCEDURE — 85025 COMPLETE CBC W/AUTO DIFF WBC: CPT

## 2024-01-14 PROCEDURE — 2580000003 HC RX 258: Performed by: NURSE PRACTITIONER

## 2024-01-14 PROCEDURE — 2500000003 HC RX 250 WO HCPCS: Performed by: RADIOLOGY

## 2024-01-14 PROCEDURE — 80048 BASIC METABOLIC PNL TOTAL CA: CPT

## 2024-01-14 PROCEDURE — 97165 OT EVAL LOW COMPLEX 30 MIN: CPT

## 2024-01-14 PROCEDURE — 6360000002 HC RX W HCPCS: Performed by: NURSE PRACTITIONER

## 2024-01-14 PROCEDURE — 6360000002 HC RX W HCPCS: Performed by: STUDENT IN AN ORGANIZED HEALTH CARE EDUCATION/TRAINING PROGRAM

## 2024-01-14 PROCEDURE — 36415 COLL VENOUS BLD VENIPUNCTURE: CPT

## 2024-01-14 RX ORDER — ONDANSETRON 2 MG/ML
4 INJECTION INTRAMUSCULAR; INTRAVENOUS EVERY 6 HOURS PRN
Status: DISCONTINUED | OUTPATIENT
Start: 2024-01-14 | End: 2024-01-18 | Stop reason: HOSPADM

## 2024-01-14 RX ORDER — HYDROXYZINE HYDROCHLORIDE 25 MG/1
25 TABLET, FILM COATED ORAL 3 TIMES DAILY PRN
Status: DISCONTINUED | OUTPATIENT
Start: 2024-01-14 | End: 2024-01-18 | Stop reason: HOSPADM

## 2024-01-14 RX ORDER — ENOXAPARIN SODIUM 100 MG/ML
40 INJECTION SUBCUTANEOUS DAILY
Status: DISCONTINUED | OUTPATIENT
Start: 2024-01-14 | End: 2024-01-18 | Stop reason: HOSPADM

## 2024-01-14 RX ORDER — ACETAMINOPHEN 325 MG/1
650 TABLET ORAL EVERY 6 HOURS PRN
Status: DISCONTINUED | OUTPATIENT
Start: 2024-01-14 | End: 2024-01-18 | Stop reason: HOSPADM

## 2024-01-14 RX ORDER — SODIUM CHLORIDE 9 MG/ML
INJECTION, SOLUTION INTRAVENOUS PRN
Status: DISCONTINUED | OUTPATIENT
Start: 2024-01-14 | End: 2024-01-18 | Stop reason: HOSPADM

## 2024-01-14 RX ORDER — ATORVASTATIN CALCIUM 10 MG/1
10 TABLET, FILM COATED ORAL DAILY
Status: DISCONTINUED | OUTPATIENT
Start: 2024-01-14 | End: 2024-01-18 | Stop reason: HOSPADM

## 2024-01-14 RX ORDER — ONDANSETRON 4 MG/1
4 TABLET, ORALLY DISINTEGRATING ORAL EVERY 8 HOURS PRN
Status: DISCONTINUED | OUTPATIENT
Start: 2024-01-14 | End: 2024-01-18 | Stop reason: HOSPADM

## 2024-01-14 RX ORDER — SODIUM CHLORIDE 9 MG/ML
INJECTION, SOLUTION INTRAVENOUS CONTINUOUS
Status: ACTIVE | OUTPATIENT
Start: 2024-01-14 | End: 2024-01-15

## 2024-01-14 RX ORDER — SODIUM CHLORIDE 0.9 % (FLUSH) 0.9 %
5-40 SYRINGE (ML) INJECTION EVERY 12 HOURS SCHEDULED
Status: DISCONTINUED | OUTPATIENT
Start: 2024-01-14 | End: 2024-01-18 | Stop reason: HOSPADM

## 2024-01-14 RX ORDER — PREGABALIN 75 MG/1
75 CAPSULE ORAL 3 TIMES DAILY
Status: DISCONTINUED | OUTPATIENT
Start: 2024-01-14 | End: 2024-01-18 | Stop reason: HOSPADM

## 2024-01-14 RX ORDER — TRAMADOL HYDROCHLORIDE 50 MG/1
50 TABLET ORAL DAILY PRN
Status: DISCONTINUED | OUTPATIENT
Start: 2024-01-14 | End: 2024-01-18 | Stop reason: HOSPADM

## 2024-01-14 RX ORDER — SENNA AND DOCUSATE SODIUM 50; 8.6 MG/1; MG/1
2 TABLET, FILM COATED ORAL DAILY
Status: DISCONTINUED | OUTPATIENT
Start: 2024-01-14 | End: 2024-01-18 | Stop reason: HOSPADM

## 2024-01-14 RX ORDER — SODIUM CHLORIDE 0.9 % (FLUSH) 0.9 %
5-40 SYRINGE (ML) INJECTION PRN
Status: DISCONTINUED | OUTPATIENT
Start: 2024-01-14 | End: 2024-01-18 | Stop reason: HOSPADM

## 2024-01-14 RX ORDER — CASTOR OIL AND BALSAM, PERU 788; 87 MG/G; MG/G
OINTMENT TOPICAL 2 TIMES DAILY
Status: DISCONTINUED | OUTPATIENT
Start: 2024-01-14 | End: 2024-01-18 | Stop reason: HOSPADM

## 2024-01-14 RX ORDER — ACETAMINOPHEN 650 MG/1
650 SUPPOSITORY RECTAL EVERY 6 HOURS PRN
Status: DISCONTINUED | OUTPATIENT
Start: 2024-01-14 | End: 2024-01-18 | Stop reason: HOSPADM

## 2024-01-14 RX ORDER — LEVOTHYROXINE SODIUM 0.05 MG/1
50 TABLET ORAL DAILY
Status: DISCONTINUED | OUTPATIENT
Start: 2024-01-14 | End: 2024-01-18 | Stop reason: HOSPADM

## 2024-01-14 RX ORDER — POLYETHYLENE GLYCOL 3350 17 G/17G
17 POWDER, FOR SOLUTION ORAL DAILY PRN
Status: DISCONTINUED | OUTPATIENT
Start: 2024-01-14 | End: 2024-01-18 | Stop reason: HOSPADM

## 2024-01-14 RX ORDER — HYDRALAZINE HYDROCHLORIDE 20 MG/ML
5 INJECTION INTRAMUSCULAR; INTRAVENOUS EVERY 6 HOURS PRN
Status: DISCONTINUED | OUTPATIENT
Start: 2024-01-14 | End: 2024-01-18 | Stop reason: HOSPADM

## 2024-01-14 RX ADMIN — SODIUM CHLORIDE, PRESERVATIVE FREE 10 ML: 5 INJECTION INTRAVENOUS at 20:28

## 2024-01-14 RX ADMIN — SODIUM CHLORIDE, PRESERVATIVE FREE 10 ML: 5 INJECTION INTRAVENOUS at 10:43

## 2024-01-14 RX ADMIN — SODIUM CHLORIDE: 9 INJECTION, SOLUTION INTRAVENOUS at 18:21

## 2024-01-14 RX ADMIN — DOCUSATE SODIUM AND SENNOSIDES 2 TABLET: 8.6; 5 TABLET, FILM COATED ORAL at 10:33

## 2024-01-14 RX ADMIN — MICONAZOLE NITRATE: 2 POWDER TOPICAL at 20:27

## 2024-01-14 RX ADMIN — PREGABALIN 75 MG: 75 CAPSULE ORAL at 20:27

## 2024-01-14 RX ADMIN — PREGABALIN 75 MG: 75 CAPSULE ORAL at 10:33

## 2024-01-14 RX ADMIN — PREGABALIN 75 MG: 75 CAPSULE ORAL at 15:29

## 2024-01-14 RX ADMIN — Medication: at 20:27

## 2024-01-14 RX ADMIN — LEVOTHYROXINE SODIUM 50 MCG: 0.07 TABLET ORAL at 10:33

## 2024-01-14 RX ADMIN — SODIUM CHLORIDE 1000 MG: 900 INJECTION INTRAVENOUS at 18:24

## 2024-01-14 RX ADMIN — Medication: at 12:09

## 2024-01-14 RX ADMIN — ATORVASTATIN CALCIUM 10 MG: 10 TABLET, FILM COATED ORAL at 10:33

## 2024-01-14 RX ADMIN — SODIUM CHLORIDE: 9 INJECTION, SOLUTION INTRAVENOUS at 00:17

## 2024-01-14 RX ADMIN — BARIUM SULFATE 900 ML: 20 SUSPENSION ORAL at 19:47

## 2024-01-14 RX ADMIN — ENOXAPARIN SODIUM 40 MG: 40 INJECTION SUBCUTANEOUS at 10:33

## 2024-01-14 ASSESSMENT — PAIN SCALES - GENERAL: PAINLEVEL_OUTOF10: 0

## 2024-01-14 NOTE — ED NOTES
Report given to ELDA Oneill. Nurse was informed of reason for arrival, vitals, labs, medications, orders, procedures, results, anything left pending and current plan of action. Questions were asked and received prior to departure from the patient.

## 2024-01-14 NOTE — ED NOTES
1911: Bedside and Verbal shift change report given to Tara (oncoming nurse) by Maria Guadalupe (offgoing nurse). Report included the following information Nurse Handoff Report.      2059: Patient taken to CT at this time by CT Tech    2140: Patient straight cathed at this time for urine sample. Rn Reuben and EDT Alysa bedside for straight cathed. Patient placed back on purewick.    2226: Spoke with patients HC Decision Maker Nimesh () to let him know she is admitted to the hospital at this time. Nimesh stated that he would like her to go to a rehab facility as he is not able to take full care on her on his own any more after his stroke in July. MAVERICK Nesbitt is aware of the update from HC Decision Maker.    2311: Hospitalist Taye bedside at this time

## 2024-01-14 NOTE — ED PROVIDER NOTES
at 9/16/2023. Findings: Cardiomediastinal silhouette is within normal limits. Pulmonary vasculature is not engorged. 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 No pneumothorax. Probable atelectasis left base without pleural effusion     1. Left upper lobe mass. If this has not been previously evaluated chest CT with contrast is recommended 2. Nasal atelectasis        PROCEDURES   Unless otherwise noted below, none  Procedures     CRITICAL CARE TIME   I have spent 35 minutes of critical care time in evaluating and treating this patient.  This includes time spent at bedside, time spent with family decision makers, documentation, review of labs and imaging, and/or consultation with specialist.  It does not include time spent on separately billed procedures.    This patient presents with critical illness or injury that acutely impairs one or more vital organ systems such that there is a high probability of imminent or life-threatening deterioration in the patient's condition.  This case involves decision making of high complexity to assess, manipulate, and support vital organ system failure and/or to prevent further life-threatening deterioration of the patient's condition.  Failure to initiate these interventions on an urgent basis would likely result in sudden, clinically significant or life-threatening deterioration in the patient's condition.    Abnormal findings supporting critical care: Lung mass, breast mass, cancerous metastases  Interventions to support critical care:Performing bedside care, reviewing ancillary studies, decisions with family/consultants, completing documentation.    Failure to intervene may result in: decompensation/death.     EMERGENCY DEPARTMENT COURSE and DIFFERENTIAL DIAGNOSIS/MDM   Vitals:    Vitals:    01/13/24 1930 01/13/24 2032 01/13/24 2042 01/13/24 2152   BP: 125/66 129/64 109/61    Pulse: 75 72 72 74   Resp: 12 12 11 11   Temp: 97.9 °F (36.6 °C)      TempSrc: Oral

## 2024-01-14 NOTE — H&P
Hospitalist Admission Note    NAME:   Eugenia Weathers   : 1940   MRN: [id number]     Date/Time: 2024 1:37 AM    Patient PCP: Deysi Ozuna APRN - NP    ______________________________________________________________________  Given the patient's current clinical presentation, I have a high level of concern for decompensation if discharged from the emergency department.  Complex decision making was performed, which includes reviewing the patient's available past medical records, laboratory results, and x-ray films.       My assessment of this patient's clinical condition and my plan of care is as follows.    Assessment / Plan:    Right breast mass:  Lung mass:  -Oncology consulted.  -Patient might need biopsy of the lymph nodes.    Acute kidney injury:  -Patient baseline creatinine appears to be 1.08.  -Creatinine elevated to 1.43.  -Started the patient on IV fluids.    Hyperlipidemia:  -Continue Lipitor.    Hypothyroidism:  -Continue Synthroid.    Anxiety:  Depression:  -Outpatient follow-up with primary care physician.    Essential tremor:  -Patient follows with neurology outpatient.    Patient seen and examined on 2024.    Medical Decision Making:   I personally reviewed labs: cbc, bmp  I personally reviewed imaging:CT  I personally reviewed EKG:  Toxic drug monitoring: none  Discussed case with: ED provider. After discussion I am in agreement that acuity of patient's medical condition necessitates hospital stay.      Code Status: Full  DVT Prophylaxis: Lovenox  Baseline: lives with  at home    Subjective:   CHIEF COMPLAINT: unable to stand up and walk    HISTORY OF PRESENT ILLNESS:     She is a 83-year-old lady with past medical history significant for hypothyroidism, essential tremor, osteoarthritis of the spine, hyperlipidemia, anxiety, depression presented to the hospital as patient is unable to get up and walk to the restroom.  Patient is unable to perform her daily routine

## 2024-01-15 ENCOUNTER — APPOINTMENT (OUTPATIENT)
Facility: HOSPITAL | Age: 84
DRG: 598 | End: 2024-01-15
Payer: MEDICARE

## 2024-01-15 ENCOUNTER — TELEPHONE (OUTPATIENT)
Facility: CLINIC | Age: 84
End: 2024-01-15

## 2024-01-15 ENCOUNTER — HOSPITAL ENCOUNTER (INPATIENT)
Facility: HOSPITAL | Age: 84
Discharge: HOME | DRG: 598 | End: 2024-01-18
Payer: MEDICARE

## 2024-01-15 LAB
ANION GAP SERPL CALC-SCNC: 5 MMOL/L (ref 5–15)
BACTERIA SPEC CULT: NORMAL
BASOPHILS # BLD: 0 K/UL (ref 0–0.1)
BASOPHILS NFR BLD: 0 % (ref 0–1)
BUN SERPL-MCNC: 20 MG/DL (ref 6–20)
BUN/CREAT SERPL: 17 (ref 12–20)
CALCIUM SERPL-MCNC: 8.7 MG/DL (ref 8.5–10.1)
CC UR VC: NORMAL
CHLORIDE SERPL-SCNC: 108 MMOL/L (ref 97–108)
CO2 SERPL-SCNC: 24 MMOL/L (ref 21–32)
CREAT SERPL-MCNC: 1.2 MG/DL (ref 0.55–1.02)
DIFFERENTIAL METHOD BLD: ABNORMAL
EOSINOPHIL # BLD: 0 K/UL (ref 0–0.4)
EOSINOPHIL NFR BLD: 0 % (ref 0–7)
ERYTHROCYTE [DISTWIDTH] IN BLOOD BY AUTOMATED COUNT: 14 % (ref 11.5–14.5)
GLUCOSE SERPL-MCNC: 84 MG/DL (ref 65–100)
HCT VFR BLD AUTO: 31.9 % (ref 35–47)
HGB BLD-MCNC: 10.5 G/DL (ref 11.5–16)
IMM GRANULOCYTES # BLD AUTO: 0 K/UL (ref 0–0.04)
IMM GRANULOCYTES NFR BLD AUTO: 0 % (ref 0–0.5)
LYMPHOCYTES # BLD: 1.7 K/UL (ref 0.8–3.5)
LYMPHOCYTES NFR BLD: 21 % (ref 12–49)
MCH RBC QN AUTO: 29.7 PG (ref 26–34)
MCHC RBC AUTO-ENTMCNC: 32.9 G/DL (ref 30–36.5)
MCV RBC AUTO: 90.1 FL (ref 80–99)
MONOCYTES # BLD: 1 K/UL (ref 0–1)
MONOCYTES NFR BLD: 12 % (ref 5–13)
NEUTS SEG # BLD: 5.1 K/UL (ref 1.8–8)
NEUTS SEG NFR BLD: 67 % (ref 32–75)
NRBC # BLD: 0 K/UL (ref 0–0.01)
NRBC BLD-RTO: 0 PER 100 WBC
PLATELET # BLD AUTO: 341 K/UL (ref 150–400)
PMV BLD AUTO: 10.1 FL (ref 8.9–12.9)
POTASSIUM SERPL-SCNC: 3.8 MMOL/L (ref 3.5–5.1)
RBC # BLD AUTO: 3.54 M/UL (ref 3.8–5.2)
SERVICE CMNT-IMP: NORMAL
SODIUM SERPL-SCNC: 137 MMOL/L (ref 136–145)
WBC # BLD AUTO: 7.8 K/UL (ref 3.6–11)

## 2024-01-15 PROCEDURE — 07D53ZX EXTRACTION OF RIGHT AXILLARY LYMPHATIC, PERCUTANEOUS APPROACH, DIAGNOSTIC: ICD-10-PCS | Performed by: RADIOLOGY

## 2024-01-15 PROCEDURE — 6370000000 HC RX 637 (ALT 250 FOR IP): Performed by: NURSE PRACTITIONER

## 2024-01-15 PROCEDURE — A4648 IMPLANTABLE TISSUE MARKER: HCPCS

## 2024-01-15 PROCEDURE — 2720000010 US BREAST BIOPSY AXILLA RIGHT

## 2024-01-15 PROCEDURE — 19083 BX BREAST 1ST LESION US IMAG: CPT

## 2024-01-15 PROCEDURE — 2580000003 HC RX 258: Performed by: STUDENT IN AN ORGANIZED HEALTH CARE EDUCATION/TRAINING PROGRAM

## 2024-01-15 PROCEDURE — 0H9T3ZX DRAINAGE OF RIGHT BREAST, PERCUTANEOUS APPROACH, DIAGNOSTIC: ICD-10-PCS | Performed by: RADIOLOGY

## 2024-01-15 PROCEDURE — 36415 COLL VENOUS BLD VENIPUNCTURE: CPT

## 2024-01-15 PROCEDURE — 3430000000 HC RX DIAGNOSTIC RADIOPHARMACEUTICAL: Performed by: INTERNAL MEDICINE

## 2024-01-15 PROCEDURE — 6360000002 HC RX W HCPCS: Performed by: NURSE PRACTITIONER

## 2024-01-15 PROCEDURE — 6370000000 HC RX 637 (ALT 250 FOR IP): Performed by: STUDENT IN AN ORGANIZED HEALTH CARE EDUCATION/TRAINING PROGRAM

## 2024-01-15 PROCEDURE — 88360 TUMOR IMMUNOHISTOCHEM/MANUAL: CPT

## 2024-01-15 PROCEDURE — A9503 TC99M MEDRONATE: HCPCS | Performed by: INTERNAL MEDICINE

## 2024-01-15 PROCEDURE — 80048 BASIC METABOLIC PNL TOTAL CA: CPT

## 2024-01-15 PROCEDURE — 88341 IMHCHEM/IMCYTCHM EA ADD ANTB: CPT

## 2024-01-15 PROCEDURE — 2500000003 HC RX 250 WO HCPCS: Performed by: RADIOLOGY

## 2024-01-15 PROCEDURE — 85025 COMPLETE CBC W/AUTO DIFF WBC: CPT

## 2024-01-15 PROCEDURE — 76770 US EXAM ABDO BACK WALL COMP: CPT

## 2024-01-15 PROCEDURE — 88305 TISSUE EXAM BY PATHOLOGIST: CPT

## 2024-01-15 PROCEDURE — 1100000003 HC PRIVATE W/ TELEMETRY

## 2024-01-15 PROCEDURE — 88342 IMHCHEM/IMCYTCHM 1ST ANTB: CPT

## 2024-01-15 PROCEDURE — 2580000003 HC RX 258: Performed by: NURSE PRACTITIONER

## 2024-01-15 PROCEDURE — 51702 INSERT TEMP BLADDER CATH: CPT

## 2024-01-15 PROCEDURE — 78306 BONE IMAGING WHOLE BODY: CPT | Performed by: INTERNAL MEDICINE

## 2024-01-15 RX ORDER — LIDOCAINE HYDROCHLORIDE 10 MG/ML
10 INJECTION, SOLUTION EPIDURAL; INFILTRATION; INTRACAUDAL; PERINEURAL ONCE
Status: COMPLETED | OUTPATIENT
Start: 2024-01-15 | End: 2024-01-15

## 2024-01-15 RX ORDER — TC 99M MEDRONATE 20 MG/10ML
21 INJECTION, POWDER, LYOPHILIZED, FOR SOLUTION INTRAVENOUS
Status: COMPLETED | OUTPATIENT
Start: 2024-01-15 | End: 2024-01-15

## 2024-01-15 RX ADMIN — MICONAZOLE NITRATE: 2 POWDER TOPICAL at 20:24

## 2024-01-15 RX ADMIN — PREGABALIN 75 MG: 75 CAPSULE ORAL at 14:27

## 2024-01-15 RX ADMIN — SODIUM CHLORIDE, PRESERVATIVE FREE 10 ML: 5 INJECTION INTRAVENOUS at 08:39

## 2024-01-15 RX ADMIN — LIDOCAINE HYDROCHLORIDE 10 ML: 10 INJECTION, SOLUTION EPIDURAL; INFILTRATION; INTRACAUDAL; PERINEURAL at 13:25

## 2024-01-15 RX ADMIN — MICONAZOLE NITRATE: 2 POWDER TOPICAL at 08:41

## 2024-01-15 RX ADMIN — DOCUSATE SODIUM AND SENNOSIDES 2 TABLET: 8.6; 5 TABLET, FILM COATED ORAL at 08:37

## 2024-01-15 RX ADMIN — Medication: at 08:41

## 2024-01-15 RX ADMIN — SODIUM CHLORIDE, PRESERVATIVE FREE 10 ML: 5 INJECTION INTRAVENOUS at 20:31

## 2024-01-15 RX ADMIN — PREGABALIN 75 MG: 75 CAPSULE ORAL at 20:22

## 2024-01-15 RX ADMIN — TRAMADOL HYDROCHLORIDE 50 MG: 50 TABLET ORAL at 20:21

## 2024-01-15 RX ADMIN — LEVOTHYROXINE SODIUM 50 MCG: 0.07 TABLET ORAL at 05:46

## 2024-01-15 RX ADMIN — Medication: at 20:23

## 2024-01-15 RX ADMIN — TC 99M MEDRONATE 21 MILLICURIE: 20 INJECTION, POWDER, LYOPHILIZED, FOR SOLUTION INTRAVENOUS at 09:30

## 2024-01-15 RX ADMIN — PREGABALIN 75 MG: 75 CAPSULE ORAL at 08:38

## 2024-01-15 RX ADMIN — ATORVASTATIN CALCIUM 10 MG: 10 TABLET, FILM COATED ORAL at 08:38

## 2024-01-15 RX ADMIN — SODIUM CHLORIDE 1000 MG: 900 INJECTION INTRAVENOUS at 17:14

## 2024-01-15 ASSESSMENT — PAIN SCALES - GENERAL: PAINLEVEL_OUTOF10: 6

## 2024-01-15 ASSESSMENT — PAIN DESCRIPTION - LOCATION: LOCATION: ABDOMEN

## 2024-01-15 ASSESSMENT — PAIN DESCRIPTION - ORIENTATION: ORIENTATION: LOWER

## 2024-01-15 NOTE — PROGRESS NOTES
Nuc Med -  Bone scan completed @ 1200 noon today   Pt. Transported to ultrasound for study   Bone scan results pending

## 2024-01-15 NOTE — WOUND CARE
Wound Care consult for \"DTI to right heel\"  present on admission on 1-13-24. Chart reviewed and patient assessed with nursing.   Past Medical History:   Diagnosis Date    Adverse effect of anesthesia     decreased BP with nitrous oxide    Chronic kidney disease     kidney stones    Chronic pain     neuropathy - burning feeling    Degenerative disc disease     Hypothyroidism     Ill-defined condition     genital warts    Ill-defined condition     neuropathy in torso    Ill-defined condition     osteopenia    Ill-defined condition     stigmatism right eye    Ill-defined condition     IBS    Ill-defined condition     phlebitis    Ill-defined condition     hx chickenpox/shingles    Ill-defined condition     hx double pneumonia    Ill-defined condition     \"stomach moved over after fall\" per pt per MD    Ill-defined condition     as child chronic tonsillitis/strep/gets infections easily per pt    Lumbar stenosis     Rheumatic fever     Thromboembolus (HCC)     R leg    Tremor     hand/fingers     Past Surgical History:   Procedure Laterality Date    BREAST BIOPSY Right     Stereo Bx over 30yrs ago - BENIGN    BREAST BIOPSY Left     Surgical Bx Over 30yrs ago - BENIGN    BREAST SURGERY      stereotactic bx bilateral - bening    CATARACT REMOVAL  2013    bilateral    COLONOSCOPY N/A 5/23/2016    COLONOSCOPY performed by Jairon Elias MD at [facility] ENDOSCOPY    DILATION AND CURETTAGE OF UTERUS      x2    GYN  1980    laparotomy - ovarian cysts    HEENT      dental surgeries    ORTHOPEDIC SURGERY      surgery for injury to spine cactus spine    OTHER SURGICAL HISTORY      pilonidal cyst removed    TN APPENDECTOMY      TONSILLECTOMY      TUBAL LIGATION      went in to do tubal ligation and found that tubes were \"wiped out from appendicitis\".     Assessment / Treatment: Pt. Has blanchable redness on both heels but no DTI and no open areas on the feet or heels. Also checked the buttocks and pt. Has a mild fissure in the

## 2024-01-15 NOTE — CARE COORDINATION
Care Management Initial Assessment       RUR: 14%  Readmission? No  1st IM letter given? Yes   1st  letter given: No    CM completed assessment with pts spouse.  Pt is known to reside with family in their 2 story home.  Pt is known to use primary home visits for her PCP, and use [facility].  Pt is known to need assistance with ADLs, and doesn't drive.  Pt has access to DME at home.   Pts spouse reported HHC in the past with [facility] and SNF placement-[facility].    CM discussed snf options with pts spouse, and would like CM to send referral to [facility].  CM will send referral , via epic.    CM will continue to follow.     01/15/24 8129   Service Assessment   Patient Orientation Other (see comment)   Cognition Other (see comment)   History Provided By Spouse   Primary Caregiver Self   Support Systems Spouse/Significant Other   PCP Verified by CM Yes   Last Visit to PCP Within last 3 months   Prior Functional Level Assistance with the following:;Bathing;Dressing;Cooking;Housework;Shopping;Mobility   Current Functional Level Assistance with the following:;Bathing;Dressing;Toileting;Cooking;Housework;Shopping;Mobility   Can patient return to prior living arrangement No   Ability to make needs known: Unable   Family able to assist with home care needs: Yes   Would you like for me to discuss the discharge plan with any other family members/significant others, and if so, who? No   Financial Resources Medicare   Social/Functional History   Lives With Spouse   Type of Home House   Active  No   Mode of Transportation Other   Discharge Planning   Type of Residence House   Living Arrangements Spouse/Significant Other

## 2024-01-15 NOTE — CONSULTS
[facility]  CONSULTATION    Name:  ALEXANDER DUPREE  MR#:  [id number]  :  1940  ACCOUNT #:  [id number]  DATE OF SERVICE:  2024      HISTORY OF PRESENT ILLNESS:  The patient is an 83-year-old woman with a history of arthritis and neuropathy, who came to [facility] after having difficulty walking.  In the emergency room, she was found to have a right breast mass with lingular lesion on CT scan as well as sternal lesions worrisome for metastatic disease.  We were asked to see her regarding these findings.  The patient is a poor historian.  She states that she had a mass in the right breast for \"some time now.\"  She states that she has had prior biopsies which were benign.    PAST MEDICAL HISTORY:  Obtained from the medical record identifies:  1.  Hypothyroidism.  2.  Essential tremor.  3.  Arthritis.  4.  Postherpetic neuralgia.  5.  Hyperlipidemia.  6.  History of rheumatic fever.  7.  History of DVT.  8.  Neuropathy.    PAST SURGICAL HISTORY:  The patient states she has had breast biopsy in the past; we do not see any record for this however.    SOCIAL HISTORY:  She is , lives with her .  No history of drug or alcohol abuse.    FAMILY HISTORY:  Her mother had breast cancer.    ALLERGIES:  NO KNOWN DRUG ALLERGIES.    REVIEW OF SYSTEMS:  As noted above, otherwise noncontributory.    PHYSICAL EXAMINATION:  GENERAL:  She is a pleasant, frail elderly woman, in no apparent distress.  VITAL SIGNS:  Temperature 99, pulse 95, /75.  HEENT:  EOMI.  Nonicteric sclerae.  NECK:  Supple.  She has shotty lymphadenopathy in the supraclavicular region bilaterally.  BREASTS:  She has a large mass in the right breast measuring approximately 5 cm.  LUNGS:  Clear.  CARDIAC:  Regular rate and rhythm, I/VI systolic murmur.  ABDOMEN:  Soft and nontender.  No organomegaly.  No masses felt.  EXTREMITIES:  No edema.  NEUROLOGIC:  A and O x3.  Grossly nonfocal.    LABORATORY DATA:  BUN and creatinine 
ATSP with neglected breast mass  Pt examined, chart reviewed  Consult dictated  Sami Dobson MD    
    State N does not work.    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 Lightheadedness and dizziness, vertigo. Heart \"beating out of my chest\". \"Inhaled a breath but could not exhale\". Was on a fluoride treatment with dentist. Went to ER. \"[place] like I was jello\".    Wheat Other (See Comments)     Cannot digest wheat per pt.    Other Nausea And Vomiting     Artificial sweeteners allergy. Does not use corn syrup in diet.      Prior to Admission medications    Medication Sig Start Date End Date Taking? Authorizing Provider   amoxicillin-clavulanate (AUGMENTIN) 875-125 MG per tablet Take 1 tablet by mouth 2 times daily for 10 days 1/13/24 1/23/24 Yes Shavon Mcmahan APRN - NP   traMADol (ULTRAM) 50 MG tablet Take 1 tablet by mouth daily as needed (for severe pain) for up to 30 days. Max Daily Amount: 50 mg 12/21/23 1/20/24 Yes Deysi Ozuna APRN - NP   ALPRAZolam (XANAX) 0.25 MG tablet Take 1 tablet by mouth in the morning and at bedtime for 90 days. Max Daily Amount: 0.5 mg 12/12/23 3/11/24 Yes Deysi Ozuna APRN - NP   hydrOXYzine HCl (ATARAX) 25 MG tablet TAKE 1 TABLET BY MOUTH THREE TIMES DAILY AS NEEDED FOR ITCHING 11/21/23  Yes Deysi Ozuna APRN - NP   vitamin D3 (CHOLECALCIFEROL) 10 MCG (400 UNIT) TABS tablet Take 1 tablet by mouth daily   Yes Provider, MD Mack   pregabalin (LYRICA) 75 MG capsule Take 1 capsule by mouth 3 times daily for 180 days. Max Daily Amount: 225 mg 11/17/23 5/15/24 Yes Deysi Ozuna APRN - NP   atorvastatin (LIPITOR) 10 MG tablet Take 1 tablet by mouth daily 11/17/23  Yes Deysi Ozuna APRN - NP   calcitonin (MIACALCIN) 200 UNIT/ACT nasal spray USE ONE SPRAY IN NOSTRIL ONCE DAILY 11/17/23  Yes Deysi Ozuna APRN - NP   levothyroxine (SYNTHROID) 50 MCG tablet TAKE ONE TABLET BY MOUTH ONCE DAILY BEFORE BREAKFAST 11/17/23  Yes Deysi Ozuna APRN - NP   vibegron (GEMTESA) 75 MG TABS tablet Take 1 tablet by mouth daily

## 2024-01-15 NOTE — TELEPHONE ENCOUNTER
The patient's spouse Nimesh is calling to advise the patient has been admitted to the hospital.  # [phone]

## 2024-01-16 LAB
ANION GAP SERPL CALC-SCNC: 5 MMOL/L (ref 5–15)
BASOPHILS # BLD: 0 K/UL (ref 0–0.1)
BASOPHILS NFR BLD: 1 % (ref 0–1)
BUN SERPL-MCNC: 22 MG/DL (ref 6–20)
BUN/CREAT SERPL: 19 (ref 12–20)
CALCIUM SERPL-MCNC: 8.6 MG/DL (ref 8.5–10.1)
CANCER AG27-29 SERPL-ACNC: 44.7 U/ML (ref 0–38.6)
CHLORIDE SERPL-SCNC: 108 MMOL/L (ref 97–108)
CO2 SERPL-SCNC: 23 MMOL/L (ref 21–32)
CREAT SERPL-MCNC: 1.16 MG/DL (ref 0.55–1.02)
DIFFERENTIAL METHOD BLD: ABNORMAL
EOSINOPHIL # BLD: 0 K/UL (ref 0–0.4)
EOSINOPHIL NFR BLD: 0 % (ref 0–7)
ERYTHROCYTE [DISTWIDTH] IN BLOOD BY AUTOMATED COUNT: 13.7 % (ref 11.5–14.5)
GLUCOSE SERPL-MCNC: 66 MG/DL (ref 65–100)
HCT VFR BLD AUTO: 30.8 % (ref 35–47)
HGB BLD-MCNC: 10.4 G/DL (ref 11.5–16)
IMM GRANULOCYTES # BLD AUTO: 0 K/UL (ref 0–0.04)
IMM GRANULOCYTES NFR BLD AUTO: 0 % (ref 0–0.5)
LYMPHOCYTES # BLD: 1.7 K/UL (ref 0.8–3.5)
LYMPHOCYTES NFR BLD: 22 % (ref 12–49)
MCH RBC QN AUTO: 30.1 PG (ref 26–34)
MCHC RBC AUTO-ENTMCNC: 33.8 G/DL (ref 30–36.5)
MCV RBC AUTO: 89.3 FL (ref 80–99)
MONOCYTES # BLD: 1 K/UL (ref 0–1)
MONOCYTES NFR BLD: 13 % (ref 5–13)
NEUTS SEG # BLD: 4.9 K/UL (ref 1.8–8)
NEUTS SEG NFR BLD: 64 % (ref 32–75)
NRBC # BLD: 0 K/UL (ref 0–0.01)
NRBC BLD-RTO: 0 PER 100 WBC
PLATELET # BLD AUTO: 324 K/UL (ref 150–400)
PMV BLD AUTO: 9.6 FL (ref 8.9–12.9)
POTASSIUM SERPL-SCNC: 4.1 MMOL/L (ref 3.5–5.1)
RBC # BLD AUTO: 3.45 M/UL (ref 3.8–5.2)
SODIUM SERPL-SCNC: 136 MMOL/L (ref 136–145)
WBC # BLD AUTO: 7.6 K/UL (ref 3.6–11)

## 2024-01-16 PROCEDURE — 80048 BASIC METABOLIC PNL TOTAL CA: CPT

## 2024-01-16 PROCEDURE — 97535 SELF CARE MNGMENT TRAINING: CPT | Performed by: OCCUPATIONAL THERAPIST

## 2024-01-16 PROCEDURE — 6370000000 HC RX 637 (ALT 250 FOR IP): Performed by: STUDENT IN AN ORGANIZED HEALTH CARE EDUCATION/TRAINING PROGRAM

## 2024-01-16 PROCEDURE — 36415 COLL VENOUS BLD VENIPUNCTURE: CPT

## 2024-01-16 PROCEDURE — 51798 US URINE CAPACITY MEASURE: CPT

## 2024-01-16 PROCEDURE — 97530 THERAPEUTIC ACTIVITIES: CPT

## 2024-01-16 PROCEDURE — 6360000002 HC RX W HCPCS: Performed by: NURSE PRACTITIONER

## 2024-01-16 PROCEDURE — 2580000003 HC RX 258: Performed by: NURSE PRACTITIONER

## 2024-01-16 PROCEDURE — 6370000000 HC RX 637 (ALT 250 FOR IP): Performed by: NURSE PRACTITIONER

## 2024-01-16 PROCEDURE — 92610 EVALUATE SWALLOWING FUNCTION: CPT

## 2024-01-16 PROCEDURE — 2580000003 HC RX 258: Performed by: STUDENT IN AN ORGANIZED HEALTH CARE EDUCATION/TRAINING PROGRAM

## 2024-01-16 PROCEDURE — 1100000003 HC PRIVATE W/ TELEMETRY

## 2024-01-16 PROCEDURE — 85025 COMPLETE CBC W/AUTO DIFF WBC: CPT

## 2024-01-16 RX ADMIN — LEVOTHYROXINE SODIUM 50 MCG: 0.07 TABLET ORAL at 05:59

## 2024-01-16 RX ADMIN — MICONAZOLE NITRATE: 2 POWDER TOPICAL at 08:29

## 2024-01-16 RX ADMIN — SODIUM CHLORIDE 1000 MG: 900 INJECTION INTRAVENOUS at 16:56

## 2024-01-16 RX ADMIN — SODIUM CHLORIDE, PRESERVATIVE FREE 10 ML: 5 INJECTION INTRAVENOUS at 08:29

## 2024-01-16 RX ADMIN — PREGABALIN 75 MG: 75 CAPSULE ORAL at 13:45

## 2024-01-16 RX ADMIN — Medication: at 20:18

## 2024-01-16 RX ADMIN — DOCUSATE SODIUM AND SENNOSIDES 2 TABLET: 8.6; 5 TABLET, FILM COATED ORAL at 08:28

## 2024-01-16 RX ADMIN — Medication: at 08:29

## 2024-01-16 RX ADMIN — MICONAZOLE NITRATE: 2 POWDER TOPICAL at 20:19

## 2024-01-16 RX ADMIN — PREGABALIN 75 MG: 75 CAPSULE ORAL at 08:28

## 2024-01-16 RX ADMIN — SODIUM CHLORIDE, PRESERVATIVE FREE 10 ML: 5 INJECTION INTRAVENOUS at 20:17

## 2024-01-16 RX ADMIN — ATORVASTATIN CALCIUM 10 MG: 10 TABLET, FILM COATED ORAL at 08:28

## 2024-01-16 RX ADMIN — PREGABALIN 75 MG: 75 CAPSULE ORAL at 20:17

## 2024-01-16 ASSESSMENT — PAIN SCALES - GENERAL
PAINLEVEL_OUTOF10: 2
PAINLEVEL_OUTOF10: 2

## 2024-01-17 LAB
ANION GAP SERPL CALC-SCNC: 3 MMOL/L (ref 5–15)
BASOPHILS # BLD: 0 K/UL (ref 0–0.1)
BASOPHILS NFR BLD: 0 % (ref 0–1)
BUN SERPL-MCNC: 25 MG/DL (ref 6–20)
BUN/CREAT SERPL: 21 (ref 12–20)
CALCIUM SERPL-MCNC: 8.8 MG/DL (ref 8.5–10.1)
CHLORIDE SERPL-SCNC: 108 MMOL/L (ref 97–108)
CO2 SERPL-SCNC: 26 MMOL/L (ref 21–32)
CREAT SERPL-MCNC: 1.17 MG/DL (ref 0.55–1.02)
DIFFERENTIAL METHOD BLD: ABNORMAL
EOSINOPHIL # BLD: 0 K/UL (ref 0–0.4)
EOSINOPHIL NFR BLD: 0 % (ref 0–7)
ERYTHROCYTE [DISTWIDTH] IN BLOOD BY AUTOMATED COUNT: 13.5 % (ref 11.5–14.5)
GLUCOSE SERPL-MCNC: 84 MG/DL (ref 65–100)
HCT VFR BLD AUTO: 33.5 % (ref 35–47)
HGB BLD-MCNC: 10.7 G/DL (ref 11.5–16)
IMM GRANULOCYTES # BLD AUTO: 0 K/UL (ref 0–0.04)
IMM GRANULOCYTES NFR BLD AUTO: 0 % (ref 0–0.5)
LYMPHOCYTES # BLD: 1.6 K/UL (ref 0.8–3.5)
LYMPHOCYTES NFR BLD: 22 % (ref 12–49)
MCH RBC QN AUTO: 28.8 PG (ref 26–34)
MCHC RBC AUTO-ENTMCNC: 31.9 G/DL (ref 30–36.5)
MCV RBC AUTO: 90.3 FL (ref 80–99)
MONOCYTES # BLD: 1 K/UL (ref 0–1)
MONOCYTES NFR BLD: 14 % (ref 5–13)
NEUTS SEG # BLD: 4.5 K/UL (ref 1.8–8)
NEUTS SEG NFR BLD: 64 % (ref 32–75)
NRBC # BLD: 0 K/UL (ref 0–0.01)
NRBC BLD-RTO: 0 PER 100 WBC
PLATELET # BLD AUTO: 326 K/UL (ref 150–400)
PMV BLD AUTO: 9.5 FL (ref 8.9–12.9)
POTASSIUM SERPL-SCNC: 4 MMOL/L (ref 3.5–5.1)
RBC # BLD AUTO: 3.71 M/UL (ref 3.8–5.2)
SODIUM SERPL-SCNC: 137 MMOL/L (ref 136–145)
WBC # BLD AUTO: 7.2 K/UL (ref 3.6–11)

## 2024-01-17 PROCEDURE — 6370000000 HC RX 637 (ALT 250 FOR IP): Performed by: NURSE PRACTITIONER

## 2024-01-17 PROCEDURE — 6370000000 HC RX 637 (ALT 250 FOR IP): Performed by: STUDENT IN AN ORGANIZED HEALTH CARE EDUCATION/TRAINING PROGRAM

## 2024-01-17 PROCEDURE — 85025 COMPLETE CBC W/AUTO DIFF WBC: CPT

## 2024-01-17 PROCEDURE — 1100000003 HC PRIVATE W/ TELEMETRY

## 2024-01-17 PROCEDURE — 2580000003 HC RX 258: Performed by: STUDENT IN AN ORGANIZED HEALTH CARE EDUCATION/TRAINING PROGRAM

## 2024-01-17 PROCEDURE — 36415 COLL VENOUS BLD VENIPUNCTURE: CPT

## 2024-01-17 PROCEDURE — 80048 BASIC METABOLIC PNL TOTAL CA: CPT

## 2024-01-17 PROCEDURE — 97116 GAIT TRAINING THERAPY: CPT

## 2024-01-17 RX ADMIN — SODIUM CHLORIDE, PRESERVATIVE FREE 10 ML: 5 INJECTION INTRAVENOUS at 22:10

## 2024-01-17 RX ADMIN — SODIUM CHLORIDE, PRESERVATIVE FREE 10 ML: 5 INJECTION INTRAVENOUS at 08:26

## 2024-01-17 RX ADMIN — PREGABALIN 75 MG: 75 CAPSULE ORAL at 08:26

## 2024-01-17 RX ADMIN — ATORVASTATIN CALCIUM 10 MG: 10 TABLET, FILM COATED ORAL at 08:26

## 2024-01-17 RX ADMIN — PREGABALIN 75 MG: 75 CAPSULE ORAL at 22:09

## 2024-01-17 RX ADMIN — PREGABALIN 75 MG: 75 CAPSULE ORAL at 13:27

## 2024-01-17 RX ADMIN — Medication: at 08:25

## 2024-01-17 RX ADMIN — MICONAZOLE NITRATE: 2 POWDER TOPICAL at 08:25

## 2024-01-17 RX ADMIN — MICONAZOLE NITRATE: 2 POWDER TOPICAL at 22:08

## 2024-01-17 RX ADMIN — DOCUSATE SODIUM AND SENNOSIDES 2 TABLET: 8.6; 5 TABLET, FILM COATED ORAL at 08:26

## 2024-01-17 RX ADMIN — LEVOTHYROXINE SODIUM 50 MCG: 0.07 TABLET ORAL at 05:36

## 2024-01-17 ASSESSMENT — PAIN SCALES - GENERAL
PAINLEVEL_OUTOF10: 0
PAINLEVEL_OUTOF10: 0

## 2024-01-17 NOTE — ADT AUTH CERT
PM     Signed                                                                                                                                                              Hospitalist Progress Note     NAME:              Eugenia Weathers   :   1940   MRN:   [id number]      Date/Time: 2024 5:28 PM  Patient PCP: Deysi Ozuna APRN - [place]     Estimated discharge date:  48 hours.  Barriers: Clinical stability. Heme/onc and urology consulted. PT/OT recommend SNF. CM to follow for discharge planning.        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  - urine culture with mixed [place]  - ultrasound retroperitoneal with atrophic kidneys  - avoid nephrotoxic medications  - continue IVF  - patient may take her own gemtesa  - insert becerril catheter for urinary retention  - patient denies specific allergy to pcn including angioedema, hives, shortness of breath, chest pain or palpitations.  - will start rocephin empirically until final urine culture received.  - strict I&O  - monitor lab work  - urology consulted, expertise appreciated -can attempt voiding trial prior to discharge-trial today      Hyperlipidemia  - continue statin      Hypothyroidism  - continue synthroid      Anxiety      Depression  - continue lyrica  - monitor for changes in mentation     Essential tremor  - patient

## 2024-01-17 NOTE — PLAN OF CARE
Problem: Occupational Therapy - Adult  Goal: By Discharge: Performs self-care activities at highest level of function for planned discharge setting.  See evaluation for individualized goals.  Description: FUNCTIONAL STATUS PRIOR TO ADMISSION:  Patient is a questionable historian at time of evaluation. Per chart, patient is typically modified independent for functional mobility using rollator. Patient endorses being modified independent in self-care, however anticipate occasional spouse assist needed per chart.     HOME SUPPORT: Patient lived with  who has been difficulty assisting patient. 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     Occupational Therapy Goals:  Initiated 1/14/2024  1.  Patient will perform basic grooming tasks with Supervision within 7 day(s).  2.  Patient will perform upper body dressing and bathing with Supervision within 7 day(s).  3.  Patient will perform lower body dressing and bathing with Minimal Assist using RW and/or AE prn within 7 day(s).  4.  Patient will perform toilet transfers to [facility] with Contact Guard Assist using RW prn within 7 day(s).  5.  Patient will perform all aspects of toileting with Minimal Assist using RW prn within 7 day(s).  6.  Patient will participate in upper extremity therapeutic exercise/activities with Supervision for 7 minutes within 7 day(s).    7.  Patient will utilize fall prevention techniques during functional activities with verbal cues within 7 day(s).        Outcome: Progressing   OCCUPATIONAL THERAPY TREATMENT  Patient: Eugenia Weathers (83 y.o. female)  Date: 1/16/2024  Primary Diagnosis: Breast mass [N63.0]       Precautions: Fall Risk    Chart, occupational therapy assessment, plan of care, and goals were reviewed.    ASSESSMENT  Patient continues to benefit from skilled OT services and is slowly progressing towards goals. Pt remains pleasantly confused.  Pt has increased tremors in BUE L>R supine and needed hand over hand 
  Problem: Physical Therapy - Adult  Goal: By Discharge: Performs mobility at highest level of function for planned discharge setting.  See evaluation for individualized goals.  Description: FUNCTIONAL STATUS PRIOR TO ADMISSION: Patient is a poor historian at present, however per chart, patient is ambulatory with rollator. Patient has had recent SNF stays, however  with CVA in 2023 and has been unable to care for patient. + fall history.     HOME SUPPORT PRIOR TO ADMISSION: Patient lives with , however  unable to care for patient.    Physical Therapy Goals  Initiated 1/14/2024  1.  Patient will move from supine to sit and sit to supine, scoot up and down, and roll side to side in bed with modified independence within 7 day(s).    2.  Patient will perform sit to stand with modified independence within 7 day(s).  3.  Patient will transfer from bed to chair and chair to bed with modified independence using the least restrictive device within 7 day(s).  4.  Patient will ambulate with minimal assistance for 15 feet with the least restrictive device within 7 day(s).   5.  Patient will ascend/descend 4 stairs with 2 handrail(s) with moderate assistance within 7 day(s).   Outcome: Progressing   PHYSICAL THERAPY EVALUATION    Patient: Eugenia Weathers (83 y.o. female)  Date: 1/14/2024  Primary Diagnosis: Breast mass [N63.0]       Precautions: Fall Risk                      ASSESSMENT :   DEFICITS/IMPAIRMENTS:   The patient is limited by decreased functional mobility, ROM, strength, body mechanics, activity tolerance, endurance, safety awareness, cognition, balance     Based on the impairments listed above, patient presents with decline from baseline mobility, increased risk for falls, and decreased safety awareness following admission for breast mass. Received supine in bed and agreeable to PT evaluation with encouragement and education. Patient with confusion, requiring re-orientation and re-direction during 
  Problem: Physical Therapy - Adult  Goal: By Discharge: Performs mobility at highest level of function for planned discharge setting.  See evaluation for individualized goals.  Description: FUNCTIONAL STATUS PRIOR TO ADMISSION: Patient is a poor historian at present, however per chart, patient is ambulatory with rollator. Patient has had recent SNF stays, however  with CVA in 2023 and has been unable to care for patient. + fall history.     HOME SUPPORT PRIOR TO ADMISSION: Patient lives with , however  unable to care for patient.    Physical Therapy Goals  Initiated 1/14/2024  1.  Patient will move from supine to sit and sit to supine, scoot up and down, and roll side to side in bed with modified independence within 7 day(s).    2.  Patient will perform sit to stand with modified independence within 7 day(s).  3.  Patient will transfer from bed to chair and chair to bed with modified independence using the least restrictive device within 7 day(s).  4.  Patient will ambulate with minimal assistance for 15 feet with the least restrictive device within 7 day(s).   5.  Patient will ascend/descend 4 stairs with 2 handrail(s) with moderate assistance within 7 day(s).   Outcome: Progressing   PHYSICAL THERAPY TREATMENT    Patient: Eugenia Weathers (83 y.o. female)  Date: 1/16/2024  Diagnosis: Breast mass [N63.0] Breast mass      Precautions: Fall Risk                    ASSESSMENT:  Patient continues to benefit from skilled PT services and is slowly progressing towards goals. Pt continues confused at times. She does however follow direction with cues. She is improved in her ability to come to sitting at edge of bed with HOB partially raised initially, min A of 1 with slow pace. She was able to stand and transfer to the chair with the walker with mod to max A of 2 with the rolling walker. She does show a tremor in BUE at rest but during transfer, also shows tremor of BLE and mainly slides them on the floor 
continues to show significant tremor. She responded well to cues and training and voiced being very happy to be working on walking. Continue to recommend SNF rehab at d/c.         PLAN:  Patient continues to benefit from skilled intervention to address the above impairments.  Continue treatment per established plan of care.    Recommendation for discharge: (in order for the patient to meet his/her long term goals): Therapy up to 5 days/week in Skilled nursing facility    Other factors to consider for discharge: patient's current support system is unable to meet their requirements for physical assistance, impaired cognition, high risk for falls, not safe to be alone, and concern for safely navigating or managing the home environment    IF patient discharges home will need the following DME: rolling walker and wheelchair 18 inch       SUBJECTIVE:   Patient stated, \"Thank you so much.\"    OBJECTIVE DATA SUMMARY:   Critical Behavior:  Orientation  Orientation Level: Oriented to person;Oriented to place  Cognition  Overall Cognitive Status: Exceptions  Memory: Decreased recall of recent events  Safety Judgement: Decreased awareness of need for assistance  Problem Solving: Assistance required to generate solutions;Assistance required to implement solutions  Insights: Decreased awareness of deficits  Initiation: Requires cues for some  Sequencing: Requires cues for some    Functional Mobility Training:  Bed Mobility:  Bed Mobility Training  Bed Mobility Training: Yes  Overall Level of Assistance: Stand-by assistance;Additional time (HOB partially raised)  Interventions: Safety awareness training;Verbal cues  Rolling: Stand-by assistance  Supine to Sit: Stand-by assistance;Additional time  Scooting: Stand-by assistance;Additional time  Transfers:  Transfer Training  Transfer Training: Yes  Interventions: Manual cues;Safety awareness training;Tactile cues;Verbal cues  Sit to Stand: Minimum assistance;Assist X1  Stand to Sit: 
  History Examination Decision-Making   LOW Complexity : Brief history review  LOW Complexity: 1-3 Performance deficits relating to physical, cognitive, or psychosocial skills that result in activity limitations and/or participation restrictions LOW Complexity: No comorbidities that affect functional and  no verbal  or physical assist needed to complete eval tasks   Based on the above components, the patient evaluation is determined to be of the following complexity level: Low

## 2024-01-17 NOTE — CARE COORDINATION
Transition of Care Plan:    RUR: 13%  Prior Level of Functioning: Needs Assistance with ADLs   Disposition: SNF Placement-[facility]   Accepting facility: [facility]   Follow up appointments: Follow up with PCP and/or Specialist   DME needed: will use at facility   Transportation at discharge: BLS transport   IM/IMM Medicare/ letter given: 2nd IM Medicare Letter   Is patient a  and connected with [facility]? N/A   If yes, was  transfer form completed and [facility] notified? N/A  Caregiver Contact: edgar Weathers (spouse) [phone]  Discharge Caregiver contacted prior to discharge? Family to be contacted  Care Conference needed? Not at this time   Barriers to discharge: Placement and Auth      UPDATE: 3:37PM    CM made aware that their are limited beds at [facility].  CM spoke with liaison, and it was reported that pts spouse will like a referral to be sent to [facility].    YAZMIN Garcia   [phone]        INITIAL NOTE: KENDALL made aware that pt is medically stable to d/c on today.  Pt will require insurance auth.  CM sent clinicals to [employer] ([employer] to initiate insurance auth.    CM will continue to follow.    YAZMIN Garcia CM  [phone]

## 2024-01-18 VITALS
SYSTOLIC BLOOD PRESSURE: 113 MMHG | TEMPERATURE: 98.6 F | HEIGHT: 62 IN | RESPIRATION RATE: 18 BRPM | OXYGEN SATURATION: 93 % | DIASTOLIC BLOOD PRESSURE: 58 MMHG | BODY MASS INDEX: 22.86 KG/M2 | HEART RATE: 63 BPM

## 2024-01-18 LAB
ANION GAP SERPL CALC-SCNC: 3 MMOL/L (ref 5–15)
BASOPHILS # BLD: 0 K/UL (ref 0–0.1)
BASOPHILS NFR BLD: 1 % (ref 0–1)
BUN SERPL-MCNC: 20 MG/DL (ref 6–20)
BUN/CREAT SERPL: 18 (ref 12–20)
CALCIUM SERPL-MCNC: 8.9 MG/DL (ref 8.5–10.1)
CHLORIDE SERPL-SCNC: 109 MMOL/L (ref 97–108)
CO2 SERPL-SCNC: 26 MMOL/L (ref 21–32)
CREAT SERPL-MCNC: 1.14 MG/DL (ref 0.55–1.02)
DIFFERENTIAL METHOD BLD: ABNORMAL
EOSINOPHIL # BLD: 0 K/UL (ref 0–0.4)
EOSINOPHIL NFR BLD: 0 % (ref 0–7)
ERYTHROCYTE [DISTWIDTH] IN BLOOD BY AUTOMATED COUNT: 13.5 % (ref 11.5–14.5)
GLUCOSE SERPL-MCNC: 83 MG/DL (ref 65–100)
HCT VFR BLD AUTO: 32.9 % (ref 35–47)
HGB BLD-MCNC: 10.8 G/DL (ref 11.5–16)
IMM GRANULOCYTES # BLD AUTO: 0 K/UL (ref 0–0.04)
IMM GRANULOCYTES NFR BLD AUTO: 1 % (ref 0–0.5)
LYMPHOCYTES # BLD: 1.7 K/UL (ref 0.8–3.5)
LYMPHOCYTES NFR BLD: 26 % (ref 12–49)
MCH RBC QN AUTO: 29 PG (ref 26–34)
MCHC RBC AUTO-ENTMCNC: 32.8 G/DL (ref 30–36.5)
MCV RBC AUTO: 88.4 FL (ref 80–99)
MONOCYTES # BLD: 1 K/UL (ref 0–1)
MONOCYTES NFR BLD: 15 % (ref 5–13)
NEUTS SEG # BLD: 3.8 K/UL (ref 1.8–8)
NEUTS SEG NFR BLD: 58 % (ref 32–75)
NRBC # BLD: 0 K/UL (ref 0–0.01)
NRBC BLD-RTO: 0 PER 100 WBC
PLATELET # BLD AUTO: 317 K/UL (ref 150–400)
PMV BLD AUTO: 9.2 FL (ref 8.9–12.9)
POTASSIUM SERPL-SCNC: 3.9 MMOL/L (ref 3.5–5.1)
RBC # BLD AUTO: 3.72 M/UL (ref 3.8–5.2)
SODIUM SERPL-SCNC: 138 MMOL/L (ref 136–145)
WBC # BLD AUTO: 6.5 K/UL (ref 3.6–11)

## 2024-01-18 PROCEDURE — 36415 COLL VENOUS BLD VENIPUNCTURE: CPT

## 2024-01-18 PROCEDURE — 80048 BASIC METABOLIC PNL TOTAL CA: CPT

## 2024-01-18 PROCEDURE — 2580000003 HC RX 258: Performed by: STUDENT IN AN ORGANIZED HEALTH CARE EDUCATION/TRAINING PROGRAM

## 2024-01-18 PROCEDURE — 6370000000 HC RX 637 (ALT 250 FOR IP): Performed by: NURSE PRACTITIONER

## 2024-01-18 PROCEDURE — 6370000000 HC RX 637 (ALT 250 FOR IP): Performed by: STUDENT IN AN ORGANIZED HEALTH CARE EDUCATION/TRAINING PROGRAM

## 2024-01-18 PROCEDURE — 85025 COMPLETE CBC W/AUTO DIFF WBC: CPT

## 2024-01-18 RX ORDER — TRAMADOL HYDROCHLORIDE 50 MG/1
50 TABLET ORAL DAILY PRN
Qty: 30 TABLET | Refills: 0 | Status: SHIPPED | OUTPATIENT
Start: 2024-01-18 | End: 2024-02-17

## 2024-01-18 RX ORDER — SENNA AND DOCUSATE SODIUM 50; 8.6 MG/1; MG/1
2 TABLET, FILM COATED ORAL DAILY
Qty: 60 TABLET | Refills: 0 | Status: SHIPPED | OUTPATIENT
Start: 2024-01-19

## 2024-01-18 RX ORDER — CASTOR OIL AND BALSAM, PERU 788; 87 MG/G; MG/G
OINTMENT TOPICAL 2 TIMES DAILY
Qty: 28.35 G | Refills: 0 | Status: SHIPPED | OUTPATIENT
Start: 2024-01-18

## 2024-01-18 RX ORDER — POLYETHYLENE GLYCOL 3350 17 G/17G
17 POWDER, FOR SOLUTION ORAL DAILY PRN
Qty: 527 G | Refills: 0 | Status: SHIPPED | OUTPATIENT
Start: 2024-01-18 | End: 2024-03-20

## 2024-01-18 RX ADMIN — PREGABALIN 75 MG: 75 CAPSULE ORAL at 08:33

## 2024-01-18 RX ADMIN — LEVOTHYROXINE SODIUM 50 MCG: 0.07 TABLET ORAL at 06:05

## 2024-01-18 RX ADMIN — ATORVASTATIN CALCIUM 10 MG: 10 TABLET, FILM COATED ORAL at 08:33

## 2024-01-18 RX ADMIN — MICONAZOLE NITRATE: 2 POWDER TOPICAL at 08:32

## 2024-01-18 RX ADMIN — Medication: at 08:33

## 2024-01-18 RX ADMIN — DOCUSATE SODIUM AND SENNOSIDES 2 TABLET: 8.6; 5 TABLET, FILM COATED ORAL at 08:33

## 2024-01-18 RX ADMIN — SODIUM CHLORIDE, PRESERVATIVE FREE 10 ML: 5 INJECTION INTRAVENOUS at 08:33

## 2024-01-18 RX ADMIN — PREGABALIN 75 MG: 75 CAPSULE ORAL at 14:09

## 2024-01-18 ASSESSMENT — PAIN SCALES - GENERAL
PAINLEVEL_OUTOF10: 0
PAINLEVEL_OUTOF10: 0

## 2024-01-18 NOTE — PROGRESS NOTES
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  - urine culture with mixed [place]  - ultrasound retroperitoneal with atrophic kidneys  - avoid nephrotoxic medications  - continue IVF  - patient may take her own gemtesa  - insert becerril catheter for urinary retention  - patient denies specific allergy to pcn including angioedema, hives, shortness of breath, chest pain or palpitations.  - will start rocephin empirically until final urine culture received.  - strict I&O  - monitor lab work  - urology consulted, voiding trial passed, no need for urology follow-up     Hyperlipidemia  - continue statin     Hypothyroidism  - continue synthroid     Anxiety      Depression  - continue lyrica  - monitor for changes in mentation    Essential tremor  - patient follows neurology as outpatient  - fall precautions     Impaired skin integrity  - candida underneath both breasts  - DTI to right heel  - venelex and micotin powder ordered  - wound care ordered  - turn and reposition every 2 hours.     Difficulty 
      Hospitalist Progress Note    NAME:   Eugenia Weathers   : 1940   MRN: [id number]     Date/Time: 1/15/2024 7:37 PM  Patient PCP: Deysi Ozuna APRN - NP    Estimated discharge date: greater than 48 hours.  Barriers: Clinical stability. Heme/onc and urology consulted. PT/OT recommend SNF. CM to follow for discharge planning.      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  - heme/onc following    CYNTHIA      Suspected UTI      Urinary retention - becerril catheter inserted  -creatinine improving 1.11  - urinalysis on 24 shows trace blood, moderate leukocytes, no nitrites, no bacteria.  - urine culture with mixed [place]  - ultrasound retroperitoneal with atrophic kidneys  - avoid nephrotoxic medications  - continue IVF  - patient may take her own gemtesa  - insert becerril catheter for urinary retention  - patient denies specific allergy to pcn including angioedema, hives, shortness of breath, chest pain or palpitations.  - will start rocephin empirically until final urine culture received.  - strict I&O  - monitor lab work  - urology consulted, expertise appreciated -can attempt voiding trial prior to discharge     Hyperlipidemia  - continue statin     Hypothyroidism  - continue synthroid     Anxiety      Depression  - continue lyrica  - monitor for changes in mentation    Essential tremor  - patient follows neurology as outpatient  - fall precautions     Impaired skin integrity  - candida underneath both breasts  - DTI to right heel  - venelex and micotin powder ordered  - wound care ordered  - 
      Hospitalist Progress Note    NAME:   Eugenia Weathers   : 1940   MRN: [id number]     Date/Time: 2024 5:28 PM  Patient PCP: Deysi Ozuna APRN - NP    Estimated discharge date:  48 hours.  Barriers: Clinical stability. Heme/onc and urology consulted. PT/OT recommend SNF. CM to follow for discharge planning.      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  - urine culture with mixed india  - ultrasound retroperitoneal with atrophic kidneys  - avoid nephrotoxic medications  - continue IVF  - patient may take her own gemtesa  - insert becerril catheter for urinary retention  - patient denies specific allergy to pcn including angioedema, hives, shortness of breath, chest pain or palpitations.  - will start rocephin empirically until final urine culture received.  - strict I&O  - monitor lab work  - urology consulted, expertise appreciated -can attempt voiding trial prior to discharge-trial today     Hyperlipidemia  - continue statin     Hypothyroidism  - continue synthroid     Anxiety      Depression  - continue lyrica  - monitor for changes in mentation    Essential tremor  - patient follows neurology as outpatient  - fall precautions     Impaired skin integrity  - candida underneath both breasts  - DTI to right heel  - venelex and micotin powder ordered  - wound care 
  Physician Progress Note      PATIENT:               ALEXANDER DUPREE #:                  [id number]  :                       1940  ADMIT DATE:       2024 6:06 PM  DISCH DATE:  RESPONDING  PROVIDER #:        Kaylan Kraus MD        QUERY TEXT:    Stage of Chronic Kidney Disease: Please provide further specificity, if known.    Clinical indicators include: chronic kidney disease, bun  Options provided:  -- Chronic kidney disease stage 1  -- Chronic kidney disease stage 2  -- Chronic kidney disease stage 3  -- Chronic kidney disease stage 3a  -- Chronic kidney disease stage 3b  -- Chronic kidney disease stage 4  -- Chronic kidney disease stage 5  -- Chronic kidney disease stage 5, requiring dialysis  -- End stage renal disease  -- Other - I will add my own diagnosis  -- Disagree - Not applicable / Not valid  -- Disagree - Clinically Unable to determine / Unknown        PROVIDER RESPONSE TEXT:    The patient has chronic kidney disease stage 3.      Electronically signed by:  Kaylan Kraus MD 1/15/2024 3:23 PM          
End of Shift Note    Bedside shift change report given to *** (oncoming nurse) by Lisa Lebron LPN (offgoing nurse).  Report included the following information SBAR, Kardex, and MAR    Shift worked:  7p-7a     Shift summary and any significant changes:     Patient is a x2 assist to the bedside commode, she voided 500 ml. All morning labs were completed for this patient. Patient took all medications whole. Patient had no complaints of n/v or pain.          Lisa Lebron LPN                            
End of Shift Note    Bedside shift change report given to ELDA Bains (oncoming nurse) by URIEL ALANIS RN (offgoing nurse).  Report included the following information SBAR, Kardex, and MAR    Shift worked:  7am-7pm     Shift summary and any significant changes:     Pt tolerated care fairly well. Medications were given and education was provided. Hourly rounding completed. Pt made no complaints of pain during this shift. Ott care completed with CHG. Ott removed 11:45 am. Pt up x2 to the BSC/chair with the walker. Pt had a BM during this shift. Bladder scanned 1800, 6 hours post catheter removal, resulting in 225 mL. Pt unable to void on her own at this time. Night shift to scan her either post void or prior to 12 am.            URIEL ALANIS RN                            
End of Shift Note    Bedside shift change report given to ELDA Fall (oncoming nurse) by URIEL ALANIS RN (offgoing nurse).  Report included the following information SBAR, Kardex, and MAR    Shift worked:  7am-7pm     Shift summary and any significant changes:     Pt tolerated care fairly well. Medications were given and education was provided. Hourly rounding completed. Pt made no complaints of pain during this shift. Pt up x2 to the BSC/chair with the walker. Pt set up for meals. Incontinence care completed.          URIEL ALANIS RN                            
End of Shift Note    Bedside shift change report given to ELDA Lerma (oncoming nurse) by Francheska Momin RN (offgoing nurse).  Report included the following information SBAR, Kardex, Intake/Output, MAR, Recent Results, and Cardiac Rhythm Sinus Rhythm    Shift worked:  7 am to 7:30 pm     Shift summary and any significant changes:     All scheduled medications administered. Patient denied pain and nausea during shift. Patient worked with PT and OT during shift; 2 person assist with walker and gait belt to chair and bedside commode. Oncology consult completed. US guided R breast axillary biopsy to be completed tomorrow 1/15; patient to be NPO at midnight. Wound consult placed for skin lesion to right arm and redness to heels/sacrum; Venelex applied to red areas and band-aid applied to RUE site. Patient retaining 443 ml urine at 1631; Becerril placed at 1900 per NP order and got 500 ml out. Patient had 1 bowel movement; edmundo care provided. IV flushed and patent. Nursing rounds and education completed.     Concerns for physician to address:  CT Abdomen/Pelvis to be completed tonight. Urology consult to be completed tomorrow 1/15. Nuclear bone scan still needs to be completed.      Zone phone for oncoming shift:          Activity:     Number times ambulated in hallways past shift: 0  Number of times OOB to chair past shift: 1    Cardiac:   Cardiac Monitoring: Yes           Access:  Current line(s): PIV     Genitourinary:   Urinary status: becerril    Respiratory:      Chronic home O2 use?: NO  Incentive spirometer at bedside: NO       GI:     Current diet:  ADULT DIET; Easy to Chew  Diet NPO  Passing flatus: YES  Tolerating current diet: YES       Pain Management:   Patient states pain is manageable on current regimen: YES    Skin:     Interventions: float heels, increase time out of bed, PT/OT consult, and internal/external urinary devices    Patient Safety:  Fall Score:    Interventions: bed/chair alarm, assistive device 
End of Shift Note    Bedside shift change report given to Elizabeth SCHROEDER (oncoming nurse) by Aleena Whiteside RN (offgoing nurse).  Report included the following information SBAR, Intake/Output, and MAR    Shift worked:  7a-3p     Shift summary and any significant changes:     Patient had biopsy today, dressing CDI.      Concerns for physician to address:  none     Zone phone for oncoming shift:   [id number]       Activity:  Up with assistance    Cardiac:   Cardiac Monitoring:   YES/NO: Yes    Access:  Current line(s): PIV    Genitourinary:   Urinary Status: Ott    Respiratory:   O2 Device: None (Room air)    GI:  Current diet: ADULT DIET; Easy to Chew  Tolerating current diet: YES    Pain Management:   Patient states pain is manageable on current regimen: YES    Skin:  Sathish Scale Score: 16    Patient Safety:  Nursing Judgement-Fall Risk High(Add Comments): Yes    Length of Stay:  Actual LOS: 2      Aleena Whiteside, RN    
End of Shift Note    Bedside shift change report given to Gertrude SCHROEDER (oncoming nurse) by Staci Melendez RN (offgoing nurse).  Report included the following information SBAR, Kardex, Intake/Output, MAR, and Recent Results    Shift worked: Night   Shift summary and any significant changes:     Insisted to sit on the BSC, done with three-assist and was not able to stand on her legs. With a lot of support, she sat and passed a large BM, then 300 ml of urine.. Not safe to have her out of bed except with adequate support to lift her up. Been passing urine using the incont.brief. Vs stable and Labs drawn this morning.     Staci Melendez RN                            
End of Shift Note    Bedside shift change report given to Gertrude SCHROEDER (oncoming nurse) by Staci Melendez RN (offgoing nurse).  Report included the following information SBAR, Kardex, Intake/Output, and MAR    Shift worked:  Night   Shift summary and any significant changes:     Pt tolerated care. Ott care completed. IV ax administered. IV flushed and patent. Hourly rounding completed. Used the bedpan for BM at night   Concerns for physician to address:     Zone phone for oncoming shift:          Staci Melendez RN                            
End of Shift Note    Bedside shift change report given to Staci SCHROEDER (oncoming nurse) by Elizabeth Porter RN (offgoing nurse).  Report included the following information SBAR, Kardex, Intake/Output, and MAR    Shift worked:  3P - 7P     Shift summary and any significant changes:    Pt tolerated care. Tot care completed. IV ax administered. IV flushed and patent. Hourly rounding completed.      Concerns for physician to address:      Zone phone for oncoming shift:           Elizabeth Porter RN                            
Hematology Oncology Progress Note    Follow up for: Breast Cancer    Chart notes reviewed since last visit.    Case discussed with following: .    Patient complains of the following: No complaints, she feels better    Additional concerns noted by the staff:     Patient Vitals for the past 24 hrs:   BP Temp Temp src Pulse Resp SpO2   01/18/24 0738 (!) 113/58 98.6 °F (37 °C) Oral 63 18 93 %   01/18/24 0125 127/69 97.5 °F (36.4 °C) Oral 63 17 --   01/17/24 1922 127/69 97.5 °F (36.4 °C) Oral 63 17 95 %         Review of Systems:  12 point ROS done and negative except as above    Physical Examination:  Gen NAD  Resp no distress  Psych normal      Labs:  Recent Results (from the past 24 hour(s))   CBC with Auto Differential    Collection Time: 01/18/24  3:55 AM   Result Value Ref Range    WBC 6.5 3.6 - 11.0 K/uL    RBC 3.72 (L) 3.80 - 5.20 M/uL    Hemoglobin 10.8 (L) 11.5 - 16.0 g/dL    Hematocrit 32.9 (L) 35.0 - 47.0 %    MCV 88.4 80.0 - 99.0 FL    MCH 29.0 26.0 - 34.0 PG    MCHC 32.8 30.0 - 36.5 g/dL    RDW 13.5 11.5 - 14.5 %    Platelets 317 150 - 400 K/uL    MPV 9.2 8.9 - 12.9 FL    Nucleated RBCs 0.0 0  WBC    nRBC 0.00 0.00 - 0.01 K/uL    Neutrophils % 58 32 - 75 %    Lymphocytes % 26 12 - 49 %    Monocytes % 15 (H) 5 - 13 %    Eosinophils % 0 0 - 7 %    Basophils % 1 0 - 1 %    Immature Granulocytes 1 (H) 0.0 - 0.5 %    Neutrophils Absolute 3.8 1.8 - 8.0 K/UL    Lymphocytes Absolute 1.7 0.8 - 3.5 K/UL    Monocytes Absolute 1.0 0.0 - 1.0 K/UL    Eosinophils Absolute 0.0 0.0 - 0.4 K/UL    Basophils Absolute 0.0 0.0 - 0.1 K/UL    Absolute Immature Granulocyte 0.0 0.00 - 0.04 K/UL    Differential Type AUTOMATED     Basic Metabolic Panel    Collection Time: 01/18/24  3:55 AM   Result Value Ref Range    Sodium 138 136 - 145 mmol/L    Potassium 3.9 3.5 - 5.1 mmol/L    Chloride 109 (H) 97 - 108 mmol/L    CO2 26 21 - 32 mmol/L    Anion Gap 3 (L) 5 - 15 mmol/L    Glucose 83 65 - 100 mg/dL    BUN 20 6 - 20 MG/DL    
Hematology Oncology Progress Note    Follow up for: neglected R breast mass    Chart notes reviewed since last visit.    Case discussed with following: .    Patient complains of the following: No complaints    Additional concerns noted by the staff:     Patient Vitals for the past 24 hrs:   BP Temp Temp src Pulse Resp SpO2   01/15/24 0830 (!) 117/57 97.5 °F (36.4 °C) Oral 75 20 92 %   01/15/24 0341 (!) 121/59 99.1 °F (37.3 °C) Oral 86 18 91 %   01/14/24 2330 139/61 98.8 °F (37.1 °C) Oral 91 16 --   01/14/24 1558 (!) 145/71 98.4 °F (36.9 °C) Oral 83 15 94 %   01/14/24 0954 139/75 99.3 °F (37.4 °C) Oral 95 16 94 %       Review of Systems:  12 point ROS done and negative except as above    Physical Examination:  Gen NAD  Resp no distress  Psych normal      Labs:  Recent Results (from the past 24 hour(s))   CEA    Collection Time: 01/14/24  5:16 PM   Result Value Ref Range    CEA 8.1 ng/mL   CBC with Auto Differential    Collection Time: 01/15/24  3:14 AM   Result Value Ref Range    WBC 7.8 3.6 - 11.0 K/uL    RBC 3.54 (L) 3.80 - 5.20 M/uL    Hemoglobin 10.5 (L) 11.5 - 16.0 g/dL    Hematocrit 31.9 (L) 35.0 - 47.0 %    MCV 90.1 80.0 - 99.0 FL    MCH 29.7 26.0 - 34.0 PG    MCHC 32.9 30.0 - 36.5 g/dL    RDW 14.0 11.5 - 14.5 %    Platelets 341 150 - 400 K/uL    MPV 10.1 8.9 - 12.9 FL    Nucleated RBCs 0.0 0  WBC    nRBC 0.00 0.00 - 0.01 K/uL    Neutrophils % 67 32 - 75 %    Lymphocytes % 21 12 - 49 %    Monocytes % 12 5 - 13 %    Eosinophils % 0 0 - 7 %    Basophils % 0 0 - 1 %    Immature Granulocytes 0 0.0 - 0.5 %    Neutrophils Absolute 5.1 1.8 - 8.0 K/UL    Lymphocytes Absolute 1.7 0.8 - 3.5 K/UL    Monocytes Absolute 1.0 0.0 - 1.0 K/UL    Eosinophils Absolute 0.0 0.0 - 0.4 K/UL    Basophils Absolute 0.0 0.0 - 0.1 K/UL    Absolute Immature Granulocyte 0.0 0.00 - 0.04 K/UL    Differential Type AUTOMATED     Basic Metabolic Panel    Collection Time: 01/15/24  3:14 AM   Result Value Ref Range    Sodium 137 136 - 145 
Hematology Oncology Progress Note    Follow up for: neglected R breast mass    Chart notes reviewed since last visit.    Case discussed with following: .    Patient complains of the following: No complaints    Additional concerns noted by the staff:     Patient Vitals for the past 24 hrs:   BP Temp Temp src Pulse Resp SpO2   01/16/24 0734 (!) 121/58 98.4 °F (36.9 °C) Oral 77 16 92 %   01/16/24 0320 (!) 124/58 98.4 °F (36.9 °C) Axillary 73 14 91 %   01/15/24 2031 121/65 98.6 °F (37 °C) Oral 97 16 91 %   01/15/24 2021 -- -- -- -- -- 96 %   01/15/24 1400 (!) 125/58 98.4 °F (36.9 °C) Oral 79 18 96 %         Review of Systems:  12 point ROS done and negative except as above    Physical Examination:  Gen NAD  Resp no distress  Psych normal      Labs:  Recent Results (from the past 24 hour(s))   CBC with Auto Differential    Collection Time: 01/16/24  1:51 AM   Result Value Ref Range    WBC 7.6 3.6 - 11.0 K/uL    RBC 3.45 (L) 3.80 - 5.20 M/uL    Hemoglobin 10.4 (L) 11.5 - 16.0 g/dL    Hematocrit 30.8 (L) 35.0 - 47.0 %    MCV 89.3 80.0 - 99.0 FL    MCH 30.1 26.0 - 34.0 PG    MCHC 33.8 30.0 - 36.5 g/dL    RDW 13.7 11.5 - 14.5 %    Platelets 324 150 - 400 K/uL    MPV 9.6 8.9 - 12.9 FL    Nucleated RBCs 0.0 0  WBC    nRBC 0.00 0.00 - 0.01 K/uL    Neutrophils % 64 32 - 75 %    Lymphocytes % 22 12 - 49 %    Monocytes % 13 5 - 13 %    Eosinophils % 0 0 - 7 %    Basophils % 1 0 - 1 %    Immature Granulocytes 0 0.0 - 0.5 %    Neutrophils Absolute 4.9 1.8 - 8.0 K/UL    Lymphocytes Absolute 1.7 0.8 - 3.5 K/UL    Monocytes Absolute 1.0 0.0 - 1.0 K/UL    Eosinophils Absolute 0.0 0.0 - 0.4 K/UL    Basophils Absolute 0.0 0.0 - 0.1 K/UL    Absolute Immature Granulocyte 0.0 0.00 - 0.04 K/UL    Differential Type AUTOMATED     Basic Metabolic Panel    Collection Time: 01/16/24  1:51 AM   Result Value Ref Range    Sodium 136 136 - 145 mmol/L    Potassium 4.1 3.5 - 5.1 mmol/L    Chloride 108 97 - 108 mmol/L    CO2 23 21 - 32 mmol/L    
Hematology Oncology Progress Note    Follow up for: neglected R breast mass    Chart notes reviewed since last visit.    Case discussed with following: .    Patient complains of the following: No complaints    Additional concerns noted by the staff:     Patient Vitals for the past 24 hrs:   BP Temp Temp src Pulse Resp SpO2   01/17/24 0741 127/71 98.4 °F (36.9 °C) Oral 69 16 92 %   01/17/24 0316 126/62 98.2 °F (36.8 °C) Oral 65 14 --   01/17/24 0311 126/62 98.2 °F (36.8 °C) -- 65 14 91 %   01/16/24 1920 (!) 147/72 97.9 °F (36.6 °C) Oral 69 16 96 %   01/16/24 1505 (!) 126/57 97.7 °F (36.5 °C) Oral 72 18 96 %         Review of Systems:  12 point ROS done and negative except as above    Physical Examination:  Gen NAD  Resp no distress  Psych normal      Labs:  Recent Results (from the past 24 hour(s))   CBC with Auto Differential    Collection Time: 01/17/24  3:05 AM   Result Value Ref Range    WBC 7.2 3.6 - 11.0 K/uL    RBC 3.71 (L) 3.80 - 5.20 M/uL    Hemoglobin 10.7 (L) 11.5 - 16.0 g/dL    Hematocrit 33.5 (L) 35.0 - 47.0 %    MCV 90.3 80.0 - 99.0 FL    MCH 28.8 26.0 - 34.0 PG    MCHC 31.9 30.0 - 36.5 g/dL    RDW 13.5 11.5 - 14.5 %    Platelets 326 150 - 400 K/uL    MPV 9.5 8.9 - 12.9 FL    Nucleated RBCs 0.0 0  WBC    nRBC 0.00 0.00 - 0.01 K/uL    Neutrophils % 64 32 - 75 %    Lymphocytes % 22 12 - 49 %    Monocytes % 14 (H) 5 - 13 %    Eosinophils % 0 0 - 7 %    Basophils % 0 0 - 1 %    Immature Granulocytes 0 0.0 - 0.5 %    Neutrophils Absolute 4.5 1.8 - 8.0 K/UL    Lymphocytes Absolute 1.6 0.8 - 3.5 K/UL    Monocytes Absolute 1.0 0.0 - 1.0 K/UL    Eosinophils Absolute 0.0 0.0 - 0.4 K/UL    Basophils Absolute 0.0 0.0 - 0.1 K/UL    Absolute Immature Granulocyte 0.0 0.00 - 0.04 K/UL    Differential Type AUTOMATED     Basic Metabolic Panel    Collection Time: 01/17/24  3:05 AM   Result Value Ref Range    Sodium 137 136 - 145 mmol/L    Potassium 4.0 3.5 - 5.1 mmol/L    Chloride 108 97 - 108 mmol/L    CO2 26 21 - 
I have reviewed discharge instructions with the patient. The patient verbalized understanding. Discharge medications reviewed with patient and appropriate educational materials and side effects teaching were provided. Follow-up appointments reviewed. Opportunity for questions and clarification was provided.  Venous access removed without difficulty.  Patient's belongings gathered and sent with patient. Patient is ready for discharge.     Pt discharged to [facility] at 3:30 pm via [facility]. Report called to ELDA Be.     URIEL ALANIS RN    
Nurse paged to request spiritual/emotional support for patient who had just been delivered difficult news about her medical condition. Patient was sitting calmly in her recliner and was welcoming of visit. She knows this  from a previous visit and so was glad to see him again. She stated that she had thought of reaching out to a .  provided supportive listening presence as patient shared about her condition. She stated that the news she just received was a though one. Her  is also dealing with health issues and is at another facility. She spoke at length about past crises that still linger in her memory and causing more distress. Her morgan helps in coping. She lost it for a while but recovered it and it's grown stronger. She and her  have no children and their support system is very small. They are a support to each other. When her lunch was served,   assisted in feeding her as she struggled feeding herself. Patient stated that talking to  was very helpful,  Words of comfort spoken, focus on the present encouraged, she gladly accepted an offer to pray and expressed appreciation for the visit and prayer.    Visited by: Chaplain Coleman Friedman M.Div., [facility].   Paging Service: [phone])  
Speech LAnguage Pathology EVALUATION/DISCHARGE    Patient: Eugenia Weathers (83 y.o. female)  Date: 1/16/2024  Primary Diagnosis: Breast mass [N63.0]       Precautions: Fall Risk                  ASSESSMENT :  Based on the objective data described below, the patient presents with presumed baseline oropharyngeal swallow given dental status. Patient reporting no difficulty with swallow function, however difficulty with self-feeding. Significant upper extremity tremors observed. Patient benefited from hand-over-hand assistance with feeding given UE tremors. Patient's mastication assessed to be grossly functional. Patient accepted thin liquid trials via straw without overt difficulty. No overt signs of aspiration observed with any consistency trialed.    Discussed diet options with patient. Easy to Chew appears to resemble patient's baseline diet. At this time, no further acute SLP needs. Discussed with OT re: adaptive equipment for feeding.     Patient will be discharged from skilled speech-language pathology services at this time.     PLAN :  Recommendations and Planned Interventions:  Diet: Easy to chew and thin liquids  -- Medication as tolerated  -- 1:1 assistance with PO intake  -- Routine oral care with standard toothbrush       Acute SLP Services: No, patient will be discharged from acute skilled speech-language pathology at this time.    Discharge Recommendations: No, additional SLP treatment not indicated at discharge     SUBJECTIVE:   Patient stated, “Nimesh normally cuts things up.”    OBJECTIVE:     Past Medical History:   Diagnosis Date    Adverse effect of anesthesia     decreased BP with nitrous oxide    Chronic kidney disease     kidney stones    Chronic pain     neuropathy - burning feeling    Degenerative disc disease     Hypothyroidism     Ill-defined condition     genital warts    Ill-defined condition     neuropathy in torso    Ill-defined condition     osteopenia    Ill-defined condition     stigmatism 
Speech Pathology Note    SLP orders received and chart reviewed. Patient currently NPO for US guided R breast axillary biopsy on this date. Will defer SLP evaluation and continue to follow.    Kevin Prajapati M.S., CCC-SLP        
Spiritual Care Assessment/Progress Note  [facility]    Name: Eugenia Weathers MRN: [id number]    Age: 83 y.o.     Sex: female   Language: English     Date: 1/15/2024            Total Time Calculated: 16 min              Spiritual Assessment begun in [facility] 3 MEDICAL ONCOLOGY  Service Provided For:: Patient  Referral/Consult From:: Rounding  Encounter Overview/Reason : Initial Encounter    Spiritual beliefs:      [] Involved in a morgan tradition/spiritual practice:      [] Supported by a morgan community:      [] Claims no spiritual orientation:      [] Seeking spiritual identity:           [x] Adheres to an individual form of spirituality: Attended Religion as a child, but no longer attends now     [] Not able to assess:                Identified resources for coping and support system:   Support System: Spouse       [x] Prayer                  [] Devotional reading               [] Music                  [] Guided Imagery     [] Pet visits                                        [x] Other: Dog and a cat     Specific area/focus of visit   Encounter:    Crisis:    Spiritual/Emotional needs: Type: Spiritual Support  Ritual, Rites and Sacraments:    Grief, Loss, and Adjustments:    Ethics/Mediation:    Behavioral Health:    Palliative Care:    Advance Care Planning:           Narrative:   Met with Ms. Weathers and she shared about her stay at [facility]. She has been at the hospital more than several days as the Drs. administer tests to determine what her situation is. (Her  was in the hospital, but is now at home.)  She is anxious to go to [facility] and then return home. Provided listening presence and prayer. She was grateful for the prayer and thanked me for both the visit and prayer. I was glad to be able to visit with her and help her with the lighting in her room as well.     BOSTON Frances.  PRN    paging service [phone]  
swallowing  - speech therapy consulted    PT/OT    Medical Decision Making:   I personally reviewed labs: yes  I personally reviewed imaging: yes  I personally reviewed EKG: yes  Toxic drug monitoring: Lovenox on hold for biopsy in am.  Discussed case with: patient, , nursing, CM, Dr. Marquez        Code Status: Full Code  DVT Prophylaxis: Lovenox on hold for biopsy in am. SCD    Subjective:     Chief Complaint / Reason for Physician Visit \"I'm weak this morning\"    Ms. Weathers is a 83-year-old lady with past medical history significant for hypothyroidism, essential tremor, osteoarthritis of the spine, hyperlipidemia, anxiety, depression presented to the hospital as patient is unable to get up and walk to the restroom. Patient is unable to perform her daily routine activities. Patient lives with her . Discussed with RN events overnight.       Objective:     VITALS:   Last 24hrs VS reviewed since prior progress note. Most recent are:  Patient Vitals for the past 24 hrs:   BP Temp Temp src Pulse Resp SpO2 Height   01/14/24 0954 139/75 99.3 °F (37.4 °C) Oral 95 16 94 % --   01/14/24 0641 (!) 162/68 -- -- 77 -- 92 % --   01/14/24 0245 (!) 107/58 -- -- 74 15 91 % --   01/14/24 0116 128/60 -- -- 68 13 91 % --   01/14/24 0101 137/63 -- -- 68 11 92 % --   01/14/24 0046 134/68 -- -- 69 11 93 % --   01/13/24 2359 (!) 154/74 -- -- 71 11 90 % --   01/13/24 2330 (!) 165/60 -- -- 70 10 90 % --   01/13/24 2314 (!) 168/84 -- -- 68 12 94 % --   01/13/24 2257 (!) 169/80 -- -- 72 16 -- --   01/13/24 2240 (!) 163/78 -- -- 71 -- 95 % --   01/13/24 2239 -- -- -- 74 11 94 % --   01/13/24 2152 -- -- -- 74 11 95 % 1.575 m (5' 2\")   01/13/24 2042 109/61 -- -- 72 11 91 % --   01/13/24 2032 129/64 -- -- 72 12 92 % --   01/13/24 1930 125/66 97.9 °F (36.6 °C) Oral 75 12 92 % --   01/13/24 1826 (!) 141/75 97.8 °F (36.6 °C) Oral 79 23 91 % --         Intake/Output Summary (Last 24 hours) at 1/14/2024 0956  Last data filed at 1/14/2024

## 2024-01-18 NOTE — CARE COORDINATION
01/18/24 1257   Services At/After Discharge   Transition of Care Consult (CM Consult) SNF  ([facility])   Partner SNF Yes   Services At/After Discharge Skilled Nursing Facility (SNF)  ([facility])   [place] Resource Information Provided? No   Mode of Transport at Discharge BLS  ([facility] at 3:30P)   Confirm Follow Up Transport Other (see comment)   Condition of Participation: Discharge Planning   The Patient and/or Patient Representative was provided with a Choice of Provider? Patient Representative  (pts spouse)   The Patient and/Or Patient Representative agree with the Discharge Plan? Yes   Freedom of Choice list was provided with basic dialogue that supports the patient's individualized plan of care/goals, treatment preferences, and shares the quality data associated with the providers?  Yes     Transition of Care Plan to SNF/Rehab    Communication to Patient/Family:  Met with patient and family and they are agreeable to the transition plan. The Plan for Transition of Care is related to the following treatment goals:     CM aware that pt is able to transition to snf: [facility] on today.  CM spoke with pts spouse, and informed him following.  CM arranged transport for 3:30P with [facility]     The Patient and/or patient representative was provided with a choice of provider and agrees  with the discharge plan.      Yes [x] No []    A Freedom of choice list was provided with basic dialogue that supports the patient's individualized plan of care/goals and shares the quality data associated with the providers.       Yes [x] No []    SNF/Rehab Transition:  Patient has been accepted to [facility] SNF/Rehab and meets criteria for admission.   Patient will transported by [facility]  and expected to leave at 3:30P.    Communication to SNF/Rehab:  Bedside RN, Onc Nurse , has been notified to update the transition plan to the facility and call report

## 2024-01-18 NOTE — CARE COORDINATION
Transition of Care Plan:    RUR: 13%  Prior Level of Functioning: Needs Assistance with ADLs   Disposition: SNF Placement-[facility]   Accepting facility[facility]   Follow up appointments: Follow up with PCP and/or Specialist   DME needed: will use at facility   Transportation at discharge: BLS transport   IM/IMM Medicare/ letter given: 2nd IM Medicare Letter   Is patient a [place] and connected with [facility]? N/A   If yes, was [place] transfer form completed and [facility] notified? N/A  Caregiver Contact: Nimesh Weathers (spouse) [phone]  Discharge Caregiver contacted prior to discharge? Family to be contacted  Care Conference needed? Not at this time   Barriers to discharge: Placement and Auth      UPDATE: 12:45PM    CM received call from [employer] ([employer]), regarding pts auth.  CM informed that pts auth was approved for 1/18/24-1/22/24.  Pts reference number is: [id number].      CM aware that pt will d/c on today.  CM will arrange transport.      YAZMIN Garcia   [phone]      INITIAL NOTE: CM contact [employer] ([employer]), via telephone: [phone], to verify if clinicals have received approval.  KENDALL informed that clinicals were not received and requested to be resent, via fax.  CM sent clinicals, via fax to: [phone].    CM will continue to follow.    YAZMIN Garcia CM  [phone]

## 2024-01-18 NOTE — DISCHARGE SUMMARY
mg     traMADol 50 MG tablet  Commonly known as: ULTRAM  Take 1 tablet by mouth daily as needed (for severe pain) for up to 30 days. Max Daily Amount: 50 mg     vitamin D3 10 MCG (400 UNIT) Tabs tablet  Commonly known as: CHOLECALCIFEROL            STOP taking these medications      amoxicillin-clavulanate 875-125 MG per tablet  Commonly known as: AUGMENTIN               Where to Get Your Medications        These medications were sent to [place], [place] - [address] [facility] - P [phone] - F [phone]  [address] [facility] [address]      Phone: [phone]   balsum peru-castor oil Oint ointment  miconazole 2 % powder  polyethylene glycol 17 g packet  sennosides-docusate sodium 8.6-50 MG tablet       Information about where to get these medications is not yet available    Ask your nurse or doctor about these medications  traMADol 50 MG tablet           Patient Follow Up Instructions:   Activity: PT/OT eval and treat  Diet:  Easy to chew  Wound Care: as directed    Follow-up with PCP/Oncology in 2 weeks.  Follow-up tests/labs As per above physicians  Follow-up Information       Follow up With Specialties Details Why Contact Info    own chemo med  Follow up  return to her at dischrage or transfer    [facility]   [address] [facility] [address]  [phone]    Deysi Ozuna APRN - NP Nurse Practitioner Follow up in 2 week(s)  [address] [address] [phone]      Deysi Ozuna APRN - NP Nurse Practitioner   [address] [id number] [phone]      Ck Del Rio III, MD Hematology and Oncology, Hematology, Oncology Follow up in 2 week(s)  [address]   [address]  [facility] [id number] [phone]            ________________________________________________________________    Risk of deterioration: Low    Condition at Discharge:

## 2024-01-19 ENCOUNTER — TELEPHONE (OUTPATIENT)
Facility: CLINIC | Age: 84
End: 2024-01-19

## 2024-01-19 NOTE — TELEPHONE ENCOUNTER
Update - Nimesh Weathers is the patient's spouse.  He called to update Deysi Ozuna NP that the patient went to [facility] on Saturday, 1/13/24 and was transferred to [facility] on Thursday, 1/18/24.  Nimesh stated that the patient was tested for cancer at [facility], he does not know the results and expressed confusion as to why the patient was tested for cancer.    Nimesh' callback if needed is [phone]

## 2024-01-23 ENCOUNTER — CLINICAL DOCUMENTATION (OUTPATIENT)
Facility: CLINIC | Age: 84
End: 2024-01-23

## 2024-01-23 NOTE — TELEPHONE ENCOUNTER
Call returned to Mr. Weathers.  He stated frustration that he was told his wife was tested for cancer and they know tests were done, but not what they are going to do about it.  He remembers seeing Dr. Del Rio.  Reviewed hospital records of Dr. Del Rio (heme/onc) conversation with patient on 1/18/24 discussing biopsy results but still waiting on hormone status.  Patient and oncologist both agreed she needs to get stronger before starting any therapy, plan was to discharge to rehab and follow-up with Dr. Del Rio outpatient for ongoing treatment plan.    Mr. Weathers stated \"well that makes a lot of sense.\"  He was appreciative of plan.  He states Mrs. Weathers is doing well in rehab and she is getting good care, hopes she will be coming home soon and will notify when she is discharged from rehab.

## 2024-01-23 NOTE — PROGRESS NOTES
[facility] - Plan of Care  [address], [address]  [place] [address]    [facility] Team Members: Veronique Beckwith MD; Shavon Mcmahan NP; Maria Guadalupe Ozuna NP; Osiris Gaines NP; Sophia Perez, RN; Boone Oates, RN; Hollie Walker, RN; Benita Jenkins LCSW    Eugenia Sarahy  1940 / [id number]  female    Date of Initial Visit (Start of Care): 11/17/23    Diagnoses  Patient Active Problem List   Diagnosis    Irritable bowel syndrome without diarrhea    Hypothyroidism    Spinal stenosis, lumbar    Hyperlipidemia    Personal history of DVT (deep vein thrombosis)    History of rheumatic fever    Age-related osteoporosis without current pathological fracture    Weakness generalized    Essential tremor    Neuropathy    Osteoarthritis of spine with radiculopathy, lumbar region    Bilateral leg weakness    Ambulatory dysfunction    Postherpetic neuralgia    Anxiety with depression    Breast mass       Advance Care Planning:    Code Status: Prior        Primary Decision Maker (Active): Nimesh Weathers - [phone]       1/23/2024    10:47 AM   Demographics   Marital Status        DME/Supplies:  Walker     Allergies   Allergen Reactions    Codeine Nausea Only    Nitrous Oxide Other (See Comments)     Blood pressure bottoms out    Penicillins Other (See Comments)     State PCN does not work.    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 Lightheadedness and dizziness, vertigo. Heart \"beating out of my chest\". \"Inhaled a breath but could not exhale\". Was on a fluoride treatment with dentist. Went to ER. \"Carter like I was jello\".    Wheat Other (See Comments)     Cannot digest wheat per pt.    Other Nausea And Vomiting     Artificial sweeteners allergy. Does not use corn syrup in diet.       Nutritional Requirements:   Oral with supported meal preparation    Functional/Activity Level:  Ambulatory with assistance of cane, walker and/or support of another person (significant

## 2024-01-24 ENCOUNTER — TELEPHONE (OUTPATIENT)
Facility: CLINIC | Age: 84
End: 2024-01-24

## 2024-01-24 NOTE — TELEPHONE ENCOUNTER
Patient Update -     Nimesh Core called to update Deysi Ozuna NP that Dr. Del Rio/[employer] is working to get the patient in for an appointment.  Nimesh said that the patient is still at [facility] and does not know her d/c date.    Nimesh' callback  if needed is [phone]

## 2024-01-26 ENCOUNTER — TELEPHONE (OUTPATIENT)
Facility: CLINIC | Age: 84
End: 2024-01-26

## 2024-01-26 NOTE — TELEPHONE ENCOUNTER
[facility] Nimesh Weathers is the patient's spouse.  He called today and stated that he \"got a weird call from someone at the rehab facility saying that the patient is not doing that well and they are going to kick her out by 2/4/24\".  He also said that \"Monday, 1/29/24, at 2pm there is an on the phone meeting\".  Nimesh expressed frustration and made disparaging comments about the facility.  He said that he was not able to speak with his wife or get messages to her.  He states that [facility] does not answer their phone.      Nimesh Weathers's callback is [phone]

## 2024-01-29 ENCOUNTER — OFFICE VISIT (OUTPATIENT)
Facility: CLINIC | Age: 84
End: 2024-01-29
Payer: MEDICARE

## 2024-01-29 DIAGNOSIS — R41.0 ACUTE CONFUSION: ICD-10-CM

## 2024-01-29 DIAGNOSIS — R63.0 POOR APPETITE: Primary | ICD-10-CM

## 2024-01-29 PROCEDURE — 1123F ACP DISCUSS/DSCN MKR DOCD: CPT

## 2024-01-29 PROCEDURE — G8484 FLU IMMUNIZE NO ADMIN: HCPCS

## 2024-01-29 PROCEDURE — 99309 SBSQ NF CARE MODERATE MDM 30: CPT

## 2024-01-29 NOTE — PROGRESS NOTES
PLACE OF SERVICE:  [place] [id number] [place] [address]    Long Term Care Note    1/30/2024    Chief Complaint: No chief complaint on file.       HPI : Eugenia Weathers is a 83 y.o. female here for evaluation of the following medical concerns:   I am seeing her today after staff reported increased confusion, and decrease in her appetited. Her  has been made aware. She was in her room in no acute distress. HER VS remains stable, she is not in any respiratory distress. No acute evens reported no falls reported Therapy reported that today she did not want to work with them while yesterday she was ambulating with walker with them ,notes reviews. She did not complain of pain , no suprapubic pain noted. She has had history of UTI and will order UA on her. She remains afebrile, no fever or temperature reported. She was diagnosed  with Breast cancer during her admission, staff to make follow up appointment with oncologist . Per staff documentation she has  had a decrease in appetite, and her meal intake have been down from 100% to 50%. she has been refusing her medication  No other acute evets reported .VSremains stable , will continue to monitor patient .    PMH:   Hyperlipidemia  Hypothyroidism  Anxiety  Depression  Essential tremor    ROS:   The following system review was negative:  Constitutional; Respiratory; Cardiovascular; Genitourinary; Gastrointestinal; Psychiatric; Ear-Nose-Throat; Musculoskeletal; Neurologic; Endocrine; Hematologic; Skin; Eyes; denies any    Medications: Reviewed in EMR and assessment and plan        Vitals:   BP -124/68; HR-72; RR-16; OXYGEN SATURATION : 98%      Exam:  Constitutional: No acute distress;   Eyes: Sclera clear, PERRLA;   Ears/nose/mouth/throat:mmm, OP clear, trachea midline;  Cardiovascular: RRR,nml S1 and S2, no rubs murmurs or gallops, no edema, no cyanosis;   Respiratory: Clear to auscultation, symmetric, no respiratory

## 2024-01-30 ENCOUNTER — TELEPHONE (OUTPATIENT)
Facility: CLINIC | Age: 84
End: 2024-01-30

## 2024-01-30 NOTE — TELEPHONE ENCOUNTER
Appointment Transportation - Nimesh Weathers called to update Deysi Keller NP that the patient has an appointment on 2/13/24 with Dr. Del Rio.  He also stated that the patient will be going [facility] other tests on Friday or Sunday.  He said that he has not driven in several months and asks for recommendations/help on arranging wheelchair accommodating transportation for the patient.    Optional Resources Handout - Nimesh stated that he still has the green new patient folder.  I told him that about half way down the list is a category for medical transport. He acknowledged.  I let him know that I would send a message to the clinical team also, for additional resources.    Nimesh Weathers's callback is [phone]

## 2024-01-30 NOTE — TELEPHONE ENCOUNTER
BERTW called pt's spouse to provide additional transportation resources, to get pt to an appointment. Spouse answered the phone. BERTW provided information for [employer] and [employer] transportation service. He expressed appreciation for the resources and information.     Spouse stated that he felt ashamed that he \"allowed\" [facility], and shared details of the experience he is having with trying to communicate with staff at [facility]. He said he wished she had gone to [facility], as he has familiarity with them and he found it to be a less frustrating experience.      Spouse shared that he is looking forward to his wife coming home, potentially this Friday. He remarked that he bought her favorite coffee ice cream for her to enjoy when she gets home.     TACHO talked with spouse about long term care placement option for his wife, but, at this time, he said \"As long as I'm breathing, she's got help\", meaning he wishes to have her stay at home as long as he feels confident with his role as her caregiver.     TACHO provided reflective listening, support, and validation to spouse today.     YAZMIN Maxwell, BERTW  Licensed Clinical   [facility]  (O) [phone]  (C) [phone]

## 2024-01-31 ENCOUNTER — OFFICE VISIT (OUTPATIENT)
Facility: CLINIC | Age: 84
End: 2024-01-31

## 2024-01-31 DIAGNOSIS — E03.9 HYPOTHYROIDISM, UNSPECIFIED TYPE: ICD-10-CM

## 2024-01-31 DIAGNOSIS — F41.9 ANXIETY: ICD-10-CM

## 2024-01-31 DIAGNOSIS — E87.5 HYPERKALEMIA: Primary | ICD-10-CM

## 2024-01-31 PROBLEM — E11.9 DIABETES (HCC): Status: ACTIVE | Noted: 2024-01-31

## 2024-01-31 PROBLEM — E11.9 TYPE 2 DIABETES MELLITUS WITHOUT COMPLICATION, WITHOUT LONG-TERM CURRENT USE OF INSULIN (HCC): Status: RESOLVED | Noted: 2024-01-31 | Resolved: 2024-01-31

## 2024-01-31 PROBLEM — I10 PRIMARY HYPERTENSION: Status: ACTIVE | Noted: 2024-01-31

## 2024-01-31 NOTE — PROGRESS NOTES
PLACE OF SERVICE:  [facility] [address] [id number]    SKILLED VISIT    1/31/2024    Chief Complaint:  Bilateral knee pain and stiffness.     HPI Provider seeing resident today after staff reported that she was having pain to her bilateral knee. She has his of RA and chronic pain. of notes she had  X-ray of the left and right knees (done at the hospital  showed no acute bony abnormalities. Did show degenerative changes. X-ray of the shoulders no acute bony abnormalities . no falls reported here ,no acute events reported in the facility. Family was in the room when I saw the patient and they told me that she used to take Tylenol for her arthritis.Of note she has history of bilateral lower extremities contractures. She has been working with PT OT notes reviewed.  Her vital signs remained stable no signs and symptoms of infection she remained afebrile no fever no temperature reported.  Blood pressure blood pressure remained stable on current lisinopril and hold parameters have been added.  She has history of diabetes no signs and symptoms of hypoglycemia.  Blood sugar trends reviewed and remained stable.  She remains stable on glipizide and insulin. No other acute events reported will continue to monitor patient.      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  The following system review was negative:  Constitutional; Respiratory; Cardiovascular; Genitourinary; Gastrointestinal; Psychiatric; Ear-Nose-Throat;Neurologic; Endocrine; Hematologic; Skin; Eyes;    Medications: Reviewed in EMR and assessment and plan         Vitals: Blood pressure 154/75 temperature 97.4 pulse 81 respiration 18 oxygen 95%      Exam:  Constitutional: No acute

## 2024-01-31 NOTE — PROGRESS NOTES
PLACE OF SERVICE:  [place] [address] [id number]    SKILLED VISIT    1/31/2024    Chief Complaint:  hyperkalemia     HPI : Provider seeing the patient today for follow-up on her labs reviewed.  A couple days ago staff had reported  acute episode of confusion, decrease in appetite hence I had ordered BMP and UA C&S ON her.  Her labs came back indicating BUN at 26.3 creatinine 1.09 sodium 144 and potassium at 5.2.  Her urine urinalysis is still pending.  Will treat her for hyperkalemia with Kayexalate times once.  She was in her room today awake alert oriented at baseline in no acute distress. abdominal pain no abdominal distention noted bowel sounds present.CT of the chest one before she was admitted to the rehab showed large right breast mass 4.7 into 3.6  cm with prominent right axillary node with possible lung metastasis.  No shortness of breath reported lungs clear bilaterally no wheezing noted she has a follow-up appointment scheduled with her oncologist staff to make follow-up appointments.  She has history of anxiety her mood remains stable today.  Followed by in-house psych notes reviewed from 1/24/2024.  She has history of hypothyroidism she is on levothyroxine will order TSH . No other acute events reported vital signs remained stable we will continue to monitor patient.  No reports of pain today.    PMH:   Hyperlipidemia  Hypothyroidism  Anxiety  Depression  Essential tremor    ROS:   The following system review was negative:  Constitutional; Respiratory; Cardiovascular; Genitourinary; Gastrointestinal; Psychiatric; Ear-Nose-Throat; Musculoskeletal; Neurologic; Endocrine; Hematologic; Skin; Eyes; denies any    Medications: Reviewed in EMR and assessment and plan        Vitals: Blood pressure 130/74 temperature 97.8 pulse 76 respiration 16 oxygen 97%      Exam:  Constitutional: No acute distress;   Eyes: Sclera clear, PERRLA;   Ears/nose/mouth/throat:mmm, OP

## 2024-02-02 ENCOUNTER — TELEPHONE (OUTPATIENT)
Facility: CLINIC | Age: 84
End: 2024-02-02

## 2024-02-02 NOTE — TELEPHONE ENCOUNTER
Problem at Rehab - [place] Core is the patient's spouse.  He called to update Deysi Ozuna NP that [facility] and [facility] will not tell him when his wife will be discharged from the facility.  He states that she is in a room with a patient \"that is up all night screaming\"  He says that no one at the facility will help him and they are rude and judith with him.  He asks \"should I call the  or should I get a ?\"  Nimesh says that the facility has a  but he cannot get in touch with her.    Nimesh's callback is [phone]

## 2024-02-05 ENCOUNTER — TELEPHONE (OUTPATIENT)
Facility: CLINIC | Age: 84
End: 2024-02-05

## 2024-02-05 NOTE — TELEPHONE ENCOUNTER
Called patient to confirm their in home visit with liseth Ozuna on 2/7/2024, arrival between 9-1.  Spoke with , they confirmed the appointment and answered the covid screen questions. Does anyone in the household have covid no  Have there been any changes to insurance no or location no

## 2024-02-07 PROBLEM — I10 PRIMARY HYPERTENSION: Status: RESOLVED | Noted: 2024-01-31 | Resolved: 2024-02-07

## 2024-02-07 PROBLEM — E87.5 HYPERKALEMIA: Status: RESOLVED | Noted: 2024-01-31 | Resolved: 2024-02-07

## 2024-02-08 ENCOUNTER — APPOINTMENT (OUTPATIENT)
Facility: HOSPITAL | Age: 84
DRG: 177 | End: 2024-02-08
Payer: MEDICARE

## 2024-02-08 ENCOUNTER — TELEPHONE (OUTPATIENT)
Facility: CLINIC | Age: 84
End: 2024-02-08

## 2024-02-08 ENCOUNTER — OFFICE VISIT (OUTPATIENT)
Facility: CLINIC | Age: 84
End: 2024-02-08
Payer: MEDICARE

## 2024-02-08 ENCOUNTER — HOSPITAL ENCOUNTER (INPATIENT)
Facility: HOSPITAL | Age: 84
LOS: 4 days | Discharge: NURSING, REHAB OR LONG-TERM CARE | DRG: 177 | End: 2024-02-12
Attending: EMERGENCY MEDICINE | Admitting: FAMILY MEDICINE
Payer: MEDICARE

## 2024-02-08 DIAGNOSIS — F32.A ANXIETY AND DEPRESSION: ICD-10-CM

## 2024-02-08 DIAGNOSIS — N17.9 AKI (ACUTE KIDNEY INJURY) (HCC): ICD-10-CM

## 2024-02-08 DIAGNOSIS — U07.1 COVID-19 VIRUS INFECTION: ICD-10-CM

## 2024-02-08 DIAGNOSIS — R09.02 HYPOXIA: Primary | ICD-10-CM

## 2024-02-08 DIAGNOSIS — E03.9 HYPOTHYROIDISM, UNSPECIFIED TYPE: ICD-10-CM

## 2024-02-08 DIAGNOSIS — E87.6 HYPOKALEMIA: ICD-10-CM

## 2024-02-08 DIAGNOSIS — F41.9 ANXIETY AND DEPRESSION: ICD-10-CM

## 2024-02-08 DIAGNOSIS — U07.1 COVID: Primary | ICD-10-CM

## 2024-02-08 LAB
ANION GAP SERPL CALC-SCNC: 6 MMOL/L (ref 5–15)
BASOPHILS # BLD: 0 K/UL (ref 0–0.1)
BASOPHILS NFR BLD: 0 % (ref 0–1)
BUN SERPL-MCNC: 25 MG/DL (ref 6–20)
BUN/CREAT SERPL: 20 (ref 12–20)
CALCIUM SERPL-MCNC: 8.4 MG/DL (ref 8.5–10.1)
CHLORIDE SERPL-SCNC: 104 MMOL/L (ref 97–108)
CO2 SERPL-SCNC: 24 MMOL/L (ref 21–32)
COMMENT:: NORMAL
CREAT SERPL-MCNC: 1.26 MG/DL (ref 0.55–1.02)
DIFFERENTIAL METHOD BLD: ABNORMAL
EOSINOPHIL # BLD: 0 K/UL (ref 0–0.4)
EOSINOPHIL NFR BLD: 0 % (ref 0–7)
ERYTHROCYTE [DISTWIDTH] IN BLOOD BY AUTOMATED COUNT: 14.2 % (ref 11.5–14.5)
GLUCOSE SERPL-MCNC: 219 MG/DL (ref 65–100)
HCT VFR BLD AUTO: 37.6 % (ref 35–47)
HGB BLD-MCNC: 12.2 G/DL (ref 11.5–16)
IMM GRANULOCYTES # BLD AUTO: 0.1 K/UL (ref 0–0.04)
IMM GRANULOCYTES NFR BLD AUTO: 1 % (ref 0–0.5)
LACTATE SERPL-SCNC: 0.8 MMOL/L (ref 0.4–2)
LYMPHOCYTES # BLD: 0.5 K/UL (ref 0.8–3.5)
LYMPHOCYTES NFR BLD: 6 % (ref 12–49)
MCH RBC QN AUTO: 28.6 PG (ref 26–34)
MCHC RBC AUTO-ENTMCNC: 32.4 G/DL (ref 30–36.5)
MCV RBC AUTO: 88.3 FL (ref 80–99)
MONOCYTES # BLD: 0.8 K/UL (ref 0–1)
MONOCYTES NFR BLD: 9 % (ref 5–13)
NEUTS SEG # BLD: 7.3 K/UL (ref 1.8–8)
NEUTS SEG NFR BLD: 84 % (ref 32–75)
NRBC # BLD: 0 K/UL (ref 0–0.01)
NRBC BLD-RTO: 0 PER 100 WBC
PLATELET # BLD AUTO: 221 K/UL (ref 150–400)
PMV BLD AUTO: 10.1 FL (ref 8.9–12.9)
POTASSIUM SERPL-SCNC: 3.2 MMOL/L (ref 3.5–5.1)
RBC # BLD AUTO: 4.26 M/UL (ref 3.8–5.2)
RBC MORPH BLD: ABNORMAL
SARS-COV-2 RDRP RESP QL NAA+PROBE: DETECTED
SODIUM SERPL-SCNC: 134 MMOL/L (ref 136–145)
SOURCE: ABNORMAL
SPECIMEN HOLD: NORMAL
TROPONIN I SERPL HS-MCNC: 46 NG/L (ref 0–51)
WBC # BLD AUTO: 8.7 K/UL (ref 3.6–11)

## 2024-02-08 PROCEDURE — 96375 TX/PRO/DX INJ NEW DRUG ADDON: CPT

## 2024-02-08 PROCEDURE — 99309 SBSQ NF CARE MODERATE MDM 30: CPT

## 2024-02-08 PROCEDURE — 71045 X-RAY EXAM CHEST 1 VIEW: CPT

## 2024-02-08 PROCEDURE — 99285 EMERGENCY DEPT VISIT HI MDM: CPT

## 2024-02-08 PROCEDURE — 80048 BASIC METABOLIC PNL TOTAL CA: CPT

## 2024-02-08 PROCEDURE — 84484 ASSAY OF TROPONIN QUANT: CPT

## 2024-02-08 PROCEDURE — 36415 COLL VENOUS BLD VENIPUNCTURE: CPT

## 2024-02-08 PROCEDURE — 2580000003 HC RX 258: Performed by: EMERGENCY MEDICINE

## 2024-02-08 PROCEDURE — 6370000000 HC RX 637 (ALT 250 FOR IP): Performed by: FAMILY MEDICINE

## 2024-02-08 PROCEDURE — 96374 THER/PROPH/DIAG INJ IV PUSH: CPT

## 2024-02-08 PROCEDURE — 1123F ACP DISCUSS/DSCN MKR DOCD: CPT

## 2024-02-08 PROCEDURE — 85025 COMPLETE CBC W/AUTO DIFF WBC: CPT

## 2024-02-08 PROCEDURE — 6370000000 HC RX 637 (ALT 250 FOR IP): Performed by: EMERGENCY MEDICINE

## 2024-02-08 PROCEDURE — 2060000000 HC ICU INTERMEDIATE R&B

## 2024-02-08 PROCEDURE — 6360000002 HC RX W HCPCS: Performed by: EMERGENCY MEDICINE

## 2024-02-08 PROCEDURE — 6360000002 HC RX W HCPCS: Performed by: FAMILY MEDICINE

## 2024-02-08 PROCEDURE — 93005 ELECTROCARDIOGRAM TRACING: CPT | Performed by: EMERGENCY MEDICINE

## 2024-02-08 PROCEDURE — 87635 SARS-COV-2 COVID-19 AMP PRB: CPT

## 2024-02-08 PROCEDURE — 2580000003 HC RX 258: Performed by: FAMILY MEDICINE

## 2024-02-08 PROCEDURE — 83605 ASSAY OF LACTIC ACID: CPT

## 2024-02-08 PROCEDURE — G8484 FLU IMMUNIZE NO ADMIN: HCPCS

## 2024-02-08 RX ORDER — SODIUM CHLORIDE 0.9 % (FLUSH) 0.9 %
5-40 SYRINGE (ML) INJECTION PRN
Status: DISCONTINUED | OUTPATIENT
Start: 2024-02-08 | End: 2024-02-12 | Stop reason: HOSPADM

## 2024-02-08 RX ORDER — 0.9 % SODIUM CHLORIDE 0.9 %
1000 INTRAVENOUS SOLUTION INTRAVENOUS ONCE
Status: COMPLETED | OUTPATIENT
Start: 2024-02-08 | End: 2024-02-08

## 2024-02-08 RX ORDER — CALCITONIN SALMON 200 [IU]/.09ML
1 SPRAY, METERED NASAL DAILY
Status: DISCONTINUED | OUTPATIENT
Start: 2024-02-09 | End: 2024-02-12 | Stop reason: HOSPADM

## 2024-02-08 RX ORDER — SODIUM CHLORIDE 9 MG/ML
INJECTION, SOLUTION INTRAVENOUS PRN
Status: DISCONTINUED | OUTPATIENT
Start: 2024-02-08 | End: 2024-02-12 | Stop reason: HOSPADM

## 2024-02-08 RX ORDER — ENOXAPARIN SODIUM 100 MG/ML
30 INJECTION SUBCUTANEOUS DAILY
Status: DISCONTINUED | OUTPATIENT
Start: 2024-02-09 | End: 2024-02-09

## 2024-02-08 RX ORDER — SODIUM CHLORIDE 9 MG/ML
INJECTION, SOLUTION INTRAVENOUS CONTINUOUS
Status: DISCONTINUED | OUTPATIENT
Start: 2024-02-08 | End: 2024-02-09

## 2024-02-08 RX ORDER — OMEGA-3S/DHA/EPA/FISH OIL/D3 300MG-1000
400 CAPSULE ORAL DAILY
Status: DISCONTINUED | OUTPATIENT
Start: 2024-02-09 | End: 2024-02-12 | Stop reason: HOSPADM

## 2024-02-08 RX ORDER — DEXAMETHASONE 4 MG/1
6 TABLET ORAL EVERY 12 HOURS SCHEDULED
Status: DISCONTINUED | OUTPATIENT
Start: 2024-02-08 | End: 2024-02-12 | Stop reason: HOSPADM

## 2024-02-08 RX ORDER — ACETAMINOPHEN 500 MG
1000 TABLET ORAL
Status: COMPLETED | OUTPATIENT
Start: 2024-02-08 | End: 2024-02-08

## 2024-02-08 RX ORDER — POLYETHYLENE GLYCOL 3350 17 G/17G
17 POWDER, FOR SOLUTION ORAL DAILY PRN
Status: DISCONTINUED | OUTPATIENT
Start: 2024-02-08 | End: 2024-02-12 | Stop reason: HOSPADM

## 2024-02-08 RX ORDER — ONDANSETRON 2 MG/ML
4 INJECTION INTRAMUSCULAR; INTRAVENOUS EVERY 6 HOURS PRN
Status: DISCONTINUED | OUTPATIENT
Start: 2024-02-08 | End: 2024-02-12 | Stop reason: HOSPADM

## 2024-02-08 RX ORDER — ACETAMINOPHEN 650 MG/1
650 SUPPOSITORY RECTAL EVERY 6 HOURS PRN
Status: DISCONTINUED | OUTPATIENT
Start: 2024-02-08 | End: 2024-02-12 | Stop reason: HOSPADM

## 2024-02-08 RX ORDER — DEXAMETHASONE SODIUM PHOSPHATE 10 MG/ML
10 INJECTION, SOLUTION INTRAMUSCULAR; INTRAVENOUS ONCE
Status: COMPLETED | OUTPATIENT
Start: 2024-02-08 | End: 2024-02-08

## 2024-02-08 RX ORDER — ACETAMINOPHEN 325 MG/1
650 TABLET ORAL EVERY 6 HOURS PRN
Status: DISCONTINUED | OUTPATIENT
Start: 2024-02-08 | End: 2024-02-12 | Stop reason: HOSPADM

## 2024-02-08 RX ORDER — PREGABALIN 75 MG/1
75 CAPSULE ORAL 3 TIMES DAILY
Status: DISCONTINUED | OUTPATIENT
Start: 2024-02-08 | End: 2024-02-12 | Stop reason: HOSPADM

## 2024-02-08 RX ORDER — SODIUM CHLORIDE 0.9 % (FLUSH) 0.9 %
5-40 SYRINGE (ML) INJECTION EVERY 12 HOURS SCHEDULED
Status: DISCONTINUED | OUTPATIENT
Start: 2024-02-08 | End: 2024-02-12 | Stop reason: HOSPADM

## 2024-02-08 RX ORDER — ATORVASTATIN CALCIUM 10 MG/1
10 TABLET, FILM COATED ORAL DAILY
Status: DISCONTINUED | OUTPATIENT
Start: 2024-02-09 | End: 2024-02-12 | Stop reason: HOSPADM

## 2024-02-08 RX ORDER — ALPRAZOLAM 0.25 MG/1
0.25 TABLET ORAL 2 TIMES DAILY
Status: DISCONTINUED | OUTPATIENT
Start: 2024-02-08 | End: 2024-02-12 | Stop reason: HOSPADM

## 2024-02-08 RX ORDER — ONDANSETRON 4 MG/1
4 TABLET, ORALLY DISINTEGRATING ORAL EVERY 8 HOURS PRN
Status: DISCONTINUED | OUTPATIENT
Start: 2024-02-08 | End: 2024-02-12 | Stop reason: HOSPADM

## 2024-02-08 RX ORDER — LEVOTHYROXINE SODIUM 0.05 MG/1
50 TABLET ORAL DAILY
Status: DISCONTINUED | OUTPATIENT
Start: 2024-02-09 | End: 2024-02-12 | Stop reason: HOSPADM

## 2024-02-08 RX ORDER — TROSPIUM CHLORIDE 20 MG/1
20 TABLET, FILM COATED ORAL NIGHTLY
Status: DISCONTINUED | OUTPATIENT
Start: 2024-02-08 | End: 2024-02-12 | Stop reason: HOSPADM

## 2024-02-08 RX ORDER — POTASSIUM CHLORIDE 7.45 MG/ML
10 INJECTION INTRAVENOUS ONCE
Status: COMPLETED | OUTPATIENT
Start: 2024-02-08 | End: 2024-02-08

## 2024-02-08 RX ADMIN — SODIUM CHLORIDE: 9 INJECTION, SOLUTION INTRAVENOUS at 22:07

## 2024-02-08 RX ADMIN — POTASSIUM CHLORIDE 10 MEQ: 10 INJECTION, SOLUTION INTRAVENOUS at 15:47

## 2024-02-08 RX ADMIN — DEXAMETHASONE SODIUM PHOSPHATE 10 MG: 10 INJECTION INTRAMUSCULAR; INTRAVENOUS at 14:27

## 2024-02-08 RX ADMIN — PREGABALIN 75 MG: 75 CAPSULE ORAL at 22:07

## 2024-02-08 RX ADMIN — DEXAMETHASONE 6 MG: 4 TABLET ORAL at 22:06

## 2024-02-08 RX ADMIN — SODIUM CHLORIDE, PRESERVATIVE FREE 10 ML: 5 INJECTION INTRAVENOUS at 22:07

## 2024-02-08 RX ADMIN — SODIUM CHLORIDE 1000 ML: 9 INJECTION, SOLUTION INTRAVENOUS at 15:47

## 2024-02-08 RX ADMIN — ACETAMINOPHEN 1000 MG: 500 TABLET ORAL at 14:27

## 2024-02-08 NOTE — ED TRIAGE NOTES
Patient arrives from [facility] and rehab.    She's been feeling bad for about 3 days, tested  positive for covid this morning.    Oral fever is 100.4.    Patient hypoxic at 88% on room air.

## 2024-02-08 NOTE — PROGRESS NOTES
PLACE OF SERVICE:  [facility] [address] [id number]    SKILLED VISIT    2/8/2024    Chief Complaint:  Lethargy .    HPI    Provider seeing the resident today after  was very concerned about hers mental status and increased lethargy.  Resident was in bed appeared to be more lethargic than her baseline .she would answer questions appropriately but falls right back asleep she told me she feels very tired and had a headache.  Staff also reported that she did not have an appetite since yesterday however she had been drinking fluids.  Vitals at bedside 124/64 heart rate 95 oxygen 89 % temperature 101.3.  ordered to start her on oxygen 2 L via nasal cannula oxygen went up to 95% on 2 L via nasal cannula.   rapid COVID test  done at bedside was positive, I  started patient on paxlovid .  Tylenol added for fever and temperature.  Her lungs clear bilaterally no wheezing noted no coughing noted during the exam.  Bowel sounds are present,no abdominal distention, no abdominal tenderness noted no complaints of nausea vomiting diarrhea reported.  At this time patient will be placed on isolation per facility protocol and will continue to monitor patient.  Of note the  was very anxious since the patient was set to get discharged tomorrow provider instructed  to follow-up with social work on her stay at the facility.  .  Has history of depression anxiety her mood today remained stable.Will continue to monitor patient    PMH:   Hyperlipidemia  Hypothyroidism  Anxiety  Depression  Essential tremor    ROS:   Constitutional: Increased lethargy. In mild acute distress   The following system review was negative:  Respiratory; Cardiovascular; Genitourinary; Gastrointestinal; Psychiatric; Ear-Nose-Throat; Musculoskeletal; Neurologic; Endocrine; Hematologic; Skin; Eyes; denies any    Medications: Reviewed in [facility] EMR and assessment /  plan           Vitals:   Blood pressure 124/64

## 2024-02-08 NOTE — TELEPHONE ENCOUNTER
Covid Positive - Nimesh Core called to update Deysi Ozuna NP that [facility] was going to d/c the patient, but when he visited her today he said that \"the patient was practically comatose\".  Nimesh states that he alerted the staff and was able to get a provider to examine the patient.  He said that she tested positive for covid, has a fever of 101 and being transported to [facility].  Nimesh express much disdain throughout the conversation, saying that it was difficult to get help for the patient.    Nimesh' callback is [phone]

## 2024-02-08 NOTE — H&P
History and Physical    Date of Service:  2/8/2024  Primary Care Provider: Deysi Ozuna APRN - NP  Source of information: The patient and Chart review    Chief Complaint: Positive For Covid-19      History of Presenting Illness:   Eugenia Weathers is a 83 y.o. female who presents to the emergency room from [facility] care and rehab.  Patient states that she has been feeling better for about 3 days, febrile this a.m. and tested positive for COVID today.  Patient with increasing lethargy and feeling very tired and noted fever of 101.3 at the rehab.  Patient also was noted to be hypoxic at the rehab.  Subsequently patient was sent to the emergency room.  Patient was hypoxic on room air and subsequently placed on 2 L nasal cannula.  Further workup includes chest x-ray which shows very mild left basilar atelectasis noted hyponatremic, hypokalemic and mild elevated creatinine.  Repeat COVID in the ER here is also positive.  Patient was started on Decadron and hospitalist service requested admit the patient for further evaluation management.    The patient denies any headache, blurry vision, sore throat, trouble swallowing, trouble with speech, chest pain, SOB, cough, fever, chills, N/V/D, abd pain, urinary symptoms, constipation, recent travels, sick contacts, focal or generalized neurological symptoms, falls, injuries, rashes, contact with COVID-19 diagnosed patients, hematemesis, melena, hemoptysis, hematuria, rashes, denies starting any new medications and denies any other concerns or problems besides as mentioned above.         REVIEW OF SYSTEMS:  A comprehensive review of systems was negative except for that written in the History of Present Illness.     Past Medical History:   Diagnosis Date    Adverse effect of anesthesia     decreased BP with nitrous oxide    Chronic kidney disease     kidney stones    Chronic pain     neuropathy - burning feeling    Degenerative disc disease     Hypothyroidism

## 2024-02-08 NOTE — ED NOTES
Patient's O2 saturation dropped to 86% on RA. Placed on 2L O2 by NC, now 93 % on 2L. Sheets and brief changed. Brief was not wet but patient had small amount of hard stool in brief,

## 2024-02-08 NOTE — TELEPHONE ENCOUNTER
Home Health Orders - Dolores called from [facility] to update Deysi Ozuna NP that the patient will be discharged from [facility] Rehab tomorrow, 2/9/2024.  Dolores asks if the NP will follow the patient for orders.  Dolores also stated that the patient has an open wound on her leg, about the size of a quarter.    Dolores's callback is [phone]

## 2024-02-08 NOTE — ED PROVIDER NOTES
[facility] 4W TELEMETRY  EMERGENCY DEPARTMENT ENCOUNTER      Pt Name: Eugenia Weathers  MRN: [id number]  Birthdate 1940  Date of evaluation: 2/8/2024  Provider: Nimesh Maria MD    CHIEF COMPLAINT       Chief Complaint   Patient presents with    Positive For Covid-19         HISTORY OF PRESENT ILLNESS   (Location/Symptom, Timing/Onset, Context/Setting, Quality, Duration, Modifying Factors, Severity)  Note limiting factors.   83F w/ hx DVT, HTN, spinal stenosis, DVT p/w 3d cough. Pt reports 3d fatigue, cough and F/C. Denies any rapid or labored breathing. No pain anywhere. No N/V/D. Tested positive for COVID19 today. No home o2. Pt is a limited historian.    The history is limited by the condition of the patient.         Review of External Medical Records:     Nursing Notes were reviewed.    REVIEW OF SYSTEMS    (2-9 systems for level 4, 10 or more for level 5)     Review of Systems   Unable to perform ROS: Acuity of condition       Except as noted above the remainder of the review of systems was reviewed and negative.       PAST MEDICAL HISTORY     Past Medical History:   Diagnosis Date    Adverse effect of anesthesia     decreased BP with nitrous oxide    Chronic kidney disease     kidney stones    Chronic pain     neuropathy - burning feeling    Degenerative disc disease     Hypothyroidism     Ill-defined condition     genital warts    Ill-defined condition     neuropathy in torso    Ill-defined condition     osteopenia    Ill-defined condition     stigmatism right eye    Ill-defined condition     IBS    Ill-defined condition     phlebitis    Ill-defined condition     hx chickenpox/shingles    Ill-defined condition     hx double pneumonia    Ill-defined condition     \"stomach moved over after fall\" per pt per MD    Ill-defined condition     as child chronic tonsillitis/strep/gets infections easily per pt    Lumbar stenosis     Rheumatic fever     Thromboembolus (HCC)     R leg    Tremor     hand/fingers

## 2024-02-08 NOTE — TELEPHONE ENCOUNTER
Spoke with [facility] nurse Dolores to advise that Deysi Ozuna NP will follow pt for HH orders.  They plan to visit her the day after facility discharge.

## 2024-02-09 ENCOUNTER — TELEPHONE (OUTPATIENT)
Facility: CLINIC | Age: 84
End: 2024-02-09

## 2024-02-09 LAB
ANION GAP SERPL CALC-SCNC: 9 MMOL/L (ref 5–15)
APPEARANCE UR: ABNORMAL
BACTERIA URNS QL MICRO: ABNORMAL /HPF
BILIRUB UR QL: NEGATIVE
BUN SERPL-MCNC: 26 MG/DL (ref 6–20)
BUN/CREAT SERPL: 24 (ref 12–20)
CALCIUM SERPL-MCNC: 8 MG/DL (ref 8.5–10.1)
CHLORIDE SERPL-SCNC: 106 MMOL/L (ref 97–108)
CO2 SERPL-SCNC: 22 MMOL/L (ref 21–32)
COLOR UR: ABNORMAL
CREAT SERPL-MCNC: 1.1 MG/DL (ref 0.55–1.02)
EPITH CASTS URNS QL MICRO: ABNORMAL /LPF
GLUCOSE SERPL-MCNC: 231 MG/DL (ref 65–100)
GLUCOSE UR STRIP.AUTO-MCNC: NEGATIVE MG/DL
GRAN CASTS URNS QL MICRO: ABNORMAL /LPF
HGB UR QL STRIP: ABNORMAL
KETONES UR QL STRIP.AUTO: NEGATIVE MG/DL
LEUKOCYTE ESTERASE UR QL STRIP.AUTO: NEGATIVE
NITRITE UR QL STRIP.AUTO: NEGATIVE
PH UR STRIP: 5.5 (ref 5–8)
POTASSIUM SERPL-SCNC: 4.3 MMOL/L (ref 3.5–5.1)
PROT UR STRIP-MCNC: 300 MG/DL
RBC #/AREA URNS HPF: ABNORMAL /HPF (ref 0–5)
SODIUM SERPL-SCNC: 137 MMOL/L (ref 136–145)
SP GR UR REFRACTOMETRY: 1.02 (ref 1–1.03)
URINE CULTURE IF INDICATED: ABNORMAL
UROBILINOGEN UR QL STRIP.AUTO: 0.2 EU/DL (ref 0.2–1)
WBC URNS QL MICRO: ABNORMAL /HPF (ref 0–4)

## 2024-02-09 PROCEDURE — 2700000000 HC OXYGEN THERAPY PER DAY

## 2024-02-09 PROCEDURE — 2060000000 HC ICU INTERMEDIATE R&B

## 2024-02-09 PROCEDURE — 81001 URINALYSIS AUTO W/SCOPE: CPT

## 2024-02-09 PROCEDURE — 6360000002 HC RX W HCPCS: Performed by: FAMILY MEDICINE

## 2024-02-09 PROCEDURE — 97162 PT EVAL MOD COMPLEX 30 MIN: CPT

## 2024-02-09 PROCEDURE — 97165 OT EVAL LOW COMPLEX 30 MIN: CPT

## 2024-02-09 PROCEDURE — 36415 COLL VENOUS BLD VENIPUNCTURE: CPT

## 2024-02-09 PROCEDURE — 87077 CULTURE AEROBIC IDENTIFY: CPT

## 2024-02-09 PROCEDURE — 87186 SC STD MICRODIL/AGAR DIL: CPT

## 2024-02-09 PROCEDURE — 80048 BASIC METABOLIC PNL TOTAL CA: CPT

## 2024-02-09 PROCEDURE — 51701 INSERT BLADDER CATHETER: CPT

## 2024-02-09 PROCEDURE — 6370000000 HC RX 637 (ALT 250 FOR IP): Performed by: FAMILY MEDICINE

## 2024-02-09 PROCEDURE — 2580000003 HC RX 258: Performed by: FAMILY MEDICINE

## 2024-02-09 PROCEDURE — 87086 URINE CULTURE/COLONY COUNT: CPT

## 2024-02-09 PROCEDURE — 97530 THERAPEUTIC ACTIVITIES: CPT

## 2024-02-09 PROCEDURE — 94760 N-INVAS EAR/PLS OXIMETRY 1: CPT

## 2024-02-09 PROCEDURE — 51798 US URINE CAPACITY MEASURE: CPT

## 2024-02-09 RX ORDER — ENOXAPARIN SODIUM 100 MG/ML
40 INJECTION SUBCUTANEOUS DAILY
Status: DISCONTINUED | OUTPATIENT
Start: 2024-02-10 | End: 2024-02-12 | Stop reason: HOSPADM

## 2024-02-09 RX ORDER — POLYETHYLENE GLYCOL 3350 17 G/17G
17 POWDER, FOR SOLUTION ORAL DAILY
Status: DISCONTINUED | OUTPATIENT
Start: 2024-02-09 | End: 2024-02-12 | Stop reason: HOSPADM

## 2024-02-09 RX ADMIN — PREGABALIN 75 MG: 75 CAPSULE ORAL at 20:20

## 2024-02-09 RX ADMIN — SODIUM CHLORIDE, PRESERVATIVE FREE 10 ML: 5 INJECTION INTRAVENOUS at 20:21

## 2024-02-09 RX ADMIN — CALCITONIN SALMON 1 SPRAY: 200 SPRAY, METERED NASAL at 11:58

## 2024-02-09 RX ADMIN — LEVOTHYROXINE SODIUM 50 MCG: 50 TABLET ORAL at 07:09

## 2024-02-09 RX ADMIN — PREGABALIN 75 MG: 75 CAPSULE ORAL at 08:53

## 2024-02-09 RX ADMIN — DEXAMETHASONE 6 MG: 4 TABLET ORAL at 08:53

## 2024-02-09 RX ADMIN — ATORVASTATIN CALCIUM 10 MG: 10 TABLET, FILM COATED ORAL at 08:53

## 2024-02-09 RX ADMIN — ENOXAPARIN SODIUM 30 MG: 100 INJECTION SUBCUTANEOUS at 08:53

## 2024-02-09 RX ADMIN — Medication 400 UNITS: at 08:53

## 2024-02-09 RX ADMIN — PREGABALIN 75 MG: 75 CAPSULE ORAL at 14:25

## 2024-02-09 RX ADMIN — ALPRAZOLAM 0.25 MG: 0.25 TABLET ORAL at 20:20

## 2024-02-09 RX ADMIN — TROSPIUM CHLORIDE 20 MG: 20 TABLET, FILM COATED ORAL at 00:50

## 2024-02-09 RX ADMIN — TROSPIUM CHLORIDE 20 MG: 20 TABLET, FILM COATED ORAL at 20:20

## 2024-02-09 RX ADMIN — ALPRAZOLAM 0.25 MG: 0.25 TABLET ORAL at 08:53

## 2024-02-09 RX ADMIN — SODIUM CHLORIDE, PRESERVATIVE FREE 10 ML: 5 INJECTION INTRAVENOUS at 08:54

## 2024-02-09 RX ADMIN — DEXAMETHASONE 6 MG: 4 TABLET ORAL at 20:20

## 2024-02-09 NOTE — PROGRESS NOTES
Hospitalist Progress Note  Sveta Juarez MD  Answering service: [phone] OR [phone] from in house phone        Date of Service:  2024  NAME:  Eugenia Weathers  :  1940  MRN:  [id number]      Admission Summary:   HPI: \"Eugenia Weathers is a 83 y.o. female who presents to the emergency room from [facility] health care and rehab.  Patient states that she has been feeling better for about 3 days, febrile this a.m. and tested positive for COVID today.  Patient with increasing lethargy and feeling very tired and noted fever of 101.3 at the rehab.  Patient also was noted to be hypoxic at the rehab.  Subsequently patient was sent to the emergency room.  Patient was hypoxic on room air and subsequently placed on 2 L nasal cannula.  Further workup includes chest x-ray which shows very mild left basilar atelectasis noted hyponatremic, hypokalemic and mild elevated creatinine.  Repeat COVID in the ER here is also positive.  Patient was started on Decadron and hospitalist service requested admit the patient for further evaluation management.     The patient denies any headache, blurry vision, sore throat, trouble swallowing, trouble with speech, chest pain, SOB, cough, fever, chills, N/V/D, abd pain, urinary symptoms, constipation, recent travels, sick contacts, focal or generalized neurological symptoms, falls, injuries, rashes, contact with COVID-19 diagnosed patients, hematemesis, melena, hemoptysis, hematuria, rashes, denies starting any new medications and denies any other concerns or problems besides as mentioned above.  \"       Interval history / Subjective:   Patient seen examined at bedside earlier feels weak but better than yesterday, stable on 2 L nasal cannula     Assessment & Plan:      AHRF 2/2 COVID PNA  -Chest x-ray shows mild atelectasis but otherwise negative for acute pathology  -Decadron was started in the ER,

## 2024-02-09 NOTE — ACP (ADVANCE CARE PLANNING)
Advance Care Planning     Advance Care Planning (ACP) Physician/NP/PA Conversation    Date of Conversation: 2/8/2024  Conducted with: Patient with Decision Making Capacity    Healthcare Decision Maker:    Primary Decision Maker (Active): Nimesh Weathers - [phone]  Click here to complete Healthcare Decision Makers including selection of the Healthcare Decision Maker Relationship (ie \"Primary\").     Today we documented Decision Maker(s) consistent with Legal Next of Kin hierarchy.    Care Preferences:    Hospitalization:  \"If your health worsens and it becomes clear that your chance of recovery is unlikely, what would be your preference regarding hospitalization?\"  The patient is unsure.    Ventilation:  \"If you were unable to breath on your own and your chance of recovery was unlikely, what would be your preference about the use of a ventilator (breathing machine) if it was available to you?\"  The patient would desire the use of a ventilator.    Resuscitation:  \"In the event your heart stopped as a result of an underlying serious health condition, would you want attempts made to restart your heart, or would you prefer a natural death?\"  Yes, attempt to resuscitate.    treatment goals, benefit/burden of treatment options, ventilation preferences, hospitalization preferences, and resuscitation preferences    Conversation Outcomes / Follow-Up Plan:  ACP complete - no further action today  Reviewed DNR/DNI and patient elects Full Code (Attempt Resuscitation)    Length of Voluntary ACP Conversation in minutes:  16 minutes    Sveta Juarez MD

## 2024-02-09 NOTE — TELEPHONE ENCOUNTER
VM - 10:52 am - Nimesh Weathers left a message asking for a callback from Deysi Ozuna NP.  I returned his call to let him  know that his message was received.  He states that the patient is at [facility], tested covid positive at [facility].  He said that Kaleb at [facility] gave the patient a bath, she is on oxygen and is resting comfortably.    His callback if needed is [phone]

## 2024-02-09 NOTE — ED NOTES
TRANSFER - OUT REPORT:    Verbal report given to [facility] RN on Eugenia Weathers  being transferred to [facility] for routine progression of patient care       Report consisted of patient's Situation, Background, Assessment and   Recommendations(SBAR).     Information from the following report(s) Nurse Handoff Report, Index, ED Encounter Summary, ED SBAR, Adult Overview, MAR, Recent Results, Med Rec Status, Quality Measures, Neuro Assessment, and Event Log was reviewed with the receiving nurse.    [place] Fall Assessment:    Presents to emergency department  because of falls (Syncope, seizure, or loss of consciousness): No  Age > 70: Yes     Impaired Mobility: Ambulates or transfers with assistive devices or assistance; Unable to ambulate or transer.: No  Nursing Judgement: No          Lines:   Peripheral IV 02/08/24 Left Antecubital (Active)   Site Assessment Clean, dry & intact 02/08/24 1409   Line Status Blood return noted 02/08/24 1409   Phlebitis Assessment No symptoms 02/08/24 1409   Infiltration Assessment 0 02/08/24 1409   Alcohol Cap Used No 02/08/24 1409   Dressing Status New dressing applied 02/08/24 1409   Dressing Type Transparent 02/08/24 1409   Dressing Intervention New 02/08/24 1409        Opportunity for questions and clarification was provided.      Patient transported with:  Monitor

## 2024-02-09 NOTE — PROGRESS NOTES
Lovenox Monitoring  Indication: DVT Prophylaxis  Recent Labs     02/08/24  1413   HGB 12.2        Current Weight: 58.9 kg  Est. CrCl = 32 ml/min  Current Dose: 30 mg subcutaneously every 24 hours.  Plan: Change to 40 mg subcutaneously every 24 hours for CrCl greater than 30 ml/min and weight  kg.

## 2024-02-10 LAB
ANION GAP SERPL CALC-SCNC: 6 MMOL/L (ref 5–15)
BASOPHILS # BLD: 0 K/UL (ref 0–0.1)
BASOPHILS NFR BLD: 0 % (ref 0–1)
BUN SERPL-MCNC: 32 MG/DL (ref 6–20)
BUN/CREAT SERPL: 32 (ref 12–20)
CALCIUM SERPL-MCNC: 8.7 MG/DL (ref 8.5–10.1)
CHLORIDE SERPL-SCNC: 109 MMOL/L (ref 97–108)
CO2 SERPL-SCNC: 23 MMOL/L (ref 21–32)
CREAT SERPL-MCNC: 1.01 MG/DL (ref 0.55–1.02)
DIFFERENTIAL METHOD BLD: ABNORMAL
EKG ATRIAL RATE: 86 BPM
EKG DIAGNOSIS: NORMAL
EKG P AXIS: 47 DEGREES
EKG P-R INTERVAL: 188 MS
EKG Q-T INTERVAL: 372 MS
EKG QRS DURATION: 82 MS
EKG QTC CALCULATION (BAZETT): 445 MS
EKG R AXIS: -10 DEGREES
EKG T AXIS: 42 DEGREES
EKG VENTRICULAR RATE: 86 BPM
EOSINOPHIL # BLD: 0 K/UL (ref 0–0.4)
EOSINOPHIL NFR BLD: 0 % (ref 0–7)
ERYTHROCYTE [DISTWIDTH] IN BLOOD BY AUTOMATED COUNT: 14.6 % (ref 11.5–14.5)
EST. AVERAGE GLUCOSE BLD GHB EST-MCNC: 108 MG/DL
GLUCOSE SERPL-MCNC: 136 MG/DL (ref 65–100)
HBA1C MFR BLD: 5.4 % (ref 4–5.6)
HCT VFR BLD AUTO: 36.3 % (ref 35–47)
HGB BLD-MCNC: 12.3 G/DL (ref 11.5–16)
IMM GRANULOCYTES # BLD AUTO: 0 K/UL (ref 0–0.04)
IMM GRANULOCYTES NFR BLD AUTO: 0 % (ref 0–0.5)
LYMPHOCYTES # BLD: 0.6 K/UL (ref 0.8–3.5)
LYMPHOCYTES NFR BLD: 5 % (ref 12–49)
MCH RBC QN AUTO: 29.6 PG (ref 26–34)
MCHC RBC AUTO-ENTMCNC: 33.9 G/DL (ref 30–36.5)
MCV RBC AUTO: 87.5 FL (ref 80–99)
MONOCYTES # BLD: 1.1 K/UL (ref 0–1)
MONOCYTES NFR BLD: 9 % (ref 5–13)
NEUTS SEG # BLD: 11 K/UL (ref 1.8–8)
NEUTS SEG NFR BLD: 86 % (ref 32–75)
NRBC # BLD: 0 K/UL (ref 0–0.01)
NRBC BLD-RTO: 0 PER 100 WBC
PLATELET # BLD AUTO: 218 K/UL (ref 150–400)
PMV BLD AUTO: 10.4 FL (ref 8.9–12.9)
POTASSIUM SERPL-SCNC: 4.8 MMOL/L (ref 3.5–5.1)
RBC # BLD AUTO: 4.15 M/UL (ref 3.8–5.2)
RBC MORPH BLD: ABNORMAL
SODIUM SERPL-SCNC: 138 MMOL/L (ref 136–145)
WBC # BLD AUTO: 12.7 K/UL (ref 3.6–11)

## 2024-02-10 PROCEDURE — 80048 BASIC METABOLIC PNL TOTAL CA: CPT

## 2024-02-10 PROCEDURE — 6360000002 HC RX W HCPCS: Performed by: FAMILY MEDICINE

## 2024-02-10 PROCEDURE — 93010 ELECTROCARDIOGRAM REPORT: CPT | Performed by: INTERNAL MEDICINE

## 2024-02-10 PROCEDURE — 51702 INSERT TEMP BLADDER CATH: CPT

## 2024-02-10 PROCEDURE — 6370000000 HC RX 637 (ALT 250 FOR IP): Performed by: FAMILY MEDICINE

## 2024-02-10 PROCEDURE — 2580000003 HC RX 258: Performed by: FAMILY MEDICINE

## 2024-02-10 PROCEDURE — 1100000000 HC RM PRIVATE

## 2024-02-10 PROCEDURE — 6360000002 HC RX W HCPCS: Performed by: STUDENT IN AN ORGANIZED HEALTH CARE EDUCATION/TRAINING PROGRAM

## 2024-02-10 PROCEDURE — 85025 COMPLETE CBC W/AUTO DIFF WBC: CPT

## 2024-02-10 PROCEDURE — 94760 N-INVAS EAR/PLS OXIMETRY 1: CPT

## 2024-02-10 PROCEDURE — 83036 HEMOGLOBIN GLYCOSYLATED A1C: CPT

## 2024-02-10 PROCEDURE — 36415 COLL VENOUS BLD VENIPUNCTURE: CPT

## 2024-02-10 RX ADMIN — DEXAMETHASONE 6 MG: 4 TABLET ORAL at 21:50

## 2024-02-10 RX ADMIN — PREGABALIN 75 MG: 75 CAPSULE ORAL at 21:55

## 2024-02-10 RX ADMIN — DEXAMETHASONE 6 MG: 4 TABLET ORAL at 08:14

## 2024-02-10 RX ADMIN — CALCITONIN SALMON 1 SPRAY: 200 SPRAY, METERED NASAL at 08:13

## 2024-02-10 RX ADMIN — SODIUM CHLORIDE, PRESERVATIVE FREE 10 ML: 5 INJECTION INTRAVENOUS at 21:51

## 2024-02-10 RX ADMIN — PREGABALIN 75 MG: 75 CAPSULE ORAL at 08:14

## 2024-02-10 RX ADMIN — SODIUM CHLORIDE, PRESERVATIVE FREE 10 ML: 5 INJECTION INTRAVENOUS at 08:15

## 2024-02-10 RX ADMIN — TROSPIUM CHLORIDE 20 MG: 20 TABLET, FILM COATED ORAL at 21:50

## 2024-02-10 RX ADMIN — ATORVASTATIN CALCIUM 10 MG: 10 TABLET, FILM COATED ORAL at 08:14

## 2024-02-10 RX ADMIN — ENOXAPARIN SODIUM 40 MG: 100 INJECTION SUBCUTANEOUS at 08:14

## 2024-02-10 RX ADMIN — LEVOTHYROXINE SODIUM 50 MCG: 50 TABLET ORAL at 06:25

## 2024-02-10 RX ADMIN — ALPRAZOLAM 0.25 MG: 0.25 TABLET ORAL at 08:14

## 2024-02-10 RX ADMIN — Medication 400 UNITS: at 08:14

## 2024-02-10 RX ADMIN — ALPRAZOLAM 0.25 MG: 0.25 TABLET ORAL at 21:50

## 2024-02-10 RX ADMIN — PREGABALIN 75 MG: 75 CAPSULE ORAL at 15:42

## 2024-02-10 NOTE — CONSULTS
breathing easily, unlabored, no distress   (4) GI:  no abdominal masses, tenderness, or distension    (5) :   Emptying clear yellow UA in becerril bag, no CVA tenderness appreciated   (6) Lymphatic:  no adenopathy or edema   (7) Muscloskeletal:  no gross deformity, Normal ROM   (8) Skin:  no rash, warm, dry    (9) Neuro:  no focal deficits, Alert, she has mild tremors      Signed By: RAHAT MONIQUE, APRN - NP  - February 10, 2024

## 2024-02-10 NOTE — PROGRESS NOTES
Hospitalist Progress Note  Sveta Juarez MD  Answering service: [phone] OR [phone] from in house phone        Date of Service:  2/10/2024  NAME:  Eugenia Weathers  :  1940  MRN:  [id number]      Admission Summary:   HPI: \"Eugenia Weathers is a 83 y.o. female who presents to the emergency room from [facility] health care and rehab.  Patient states that she has been feeling better for about 3 days, febrile this a.m. and tested positive for COVID today.  Patient with increasing lethargy and feeling very tired and noted fever of 101.3 at the rehab.  Patient also was noted to be hypoxic at the rehab.  Subsequently patient was sent to the emergency room.  Patient was hypoxic on room air and subsequently placed on 2 L nasal cannula.  Further workup includes chest x-ray which shows very mild left basilar atelectasis noted hyponatremic, hypokalemic and mild elevated creatinine.  Repeat COVID in the ER here is also positive.  Patient was started on Decadron and hospitalist service requested admit the patient for further evaluation management.     The patient denies any headache, blurry vision, sore throat, trouble swallowing, trouble with speech, chest pain, SOB, cough, fever, chills, N/V/D, abd pain, urinary symptoms, constipation, recent travels, sick contacts, focal or generalized neurological symptoms, falls, injuries, rashes, contact with COVID-19 diagnosed patients, hematemesis, melena, hemoptysis, hematuria, rashes, denies starting any new medications and denies any other concerns or problems besides as mentioned above.  \"       Interval history / Subjective:   Patient seen examined at bedside earlier stable on RA      Assessment & Plan:      AHRF 2/2 COVID PNA  -Chest x-ray shows mild atelectasis but otherwise negative for acute pathology  -Decadron was started in the ER, continue, wean oxygen as tolerated, droplet

## 2024-02-11 LAB
ANION GAP SERPL CALC-SCNC: 7 MMOL/L (ref 5–15)
BASOPHILS # BLD: 0 K/UL (ref 0–0.1)
BASOPHILS NFR BLD: 0 % (ref 0–1)
BUN SERPL-MCNC: 34 MG/DL (ref 6–20)
BUN/CREAT SERPL: 37 (ref 12–20)
CALCIUM SERPL-MCNC: 8.4 MG/DL (ref 8.5–10.1)
CHLORIDE SERPL-SCNC: 106 MMOL/L (ref 97–108)
CO2 SERPL-SCNC: 25 MMOL/L (ref 21–32)
CREAT SERPL-MCNC: 0.91 MG/DL (ref 0.55–1.02)
DIFFERENTIAL METHOD BLD: ABNORMAL
EOSINOPHIL # BLD: 0 K/UL (ref 0–0.4)
EOSINOPHIL NFR BLD: 0 % (ref 0–7)
ERYTHROCYTE [DISTWIDTH] IN BLOOD BY AUTOMATED COUNT: 14.4 % (ref 11.5–14.5)
GLUCOSE SERPL-MCNC: 141 MG/DL (ref 65–100)
HCT VFR BLD AUTO: 36.8 % (ref 35–47)
HGB BLD-MCNC: 12.2 G/DL (ref 11.5–16)
IMM GRANULOCYTES # BLD AUTO: 0.1 K/UL (ref 0–0.04)
IMM GRANULOCYTES NFR BLD AUTO: 1 % (ref 0–0.5)
LYMPHOCYTES # BLD: 0.6 K/UL (ref 0.8–3.5)
LYMPHOCYTES NFR BLD: 6 % (ref 12–49)
MCH RBC QN AUTO: 29.8 PG (ref 26–34)
MCHC RBC AUTO-ENTMCNC: 33.2 G/DL (ref 30–36.5)
MCV RBC AUTO: 90 FL (ref 80–99)
MONOCYTES # BLD: 0.8 K/UL (ref 0–1)
MONOCYTES NFR BLD: 8 % (ref 5–13)
NEUTS SEG # BLD: 8.3 K/UL (ref 1.8–8)
NEUTS SEG NFR BLD: 85 % (ref 32–75)
NRBC # BLD: 0 K/UL (ref 0–0.01)
NRBC BLD-RTO: 0 PER 100 WBC
PLATELET # BLD AUTO: 208 K/UL (ref 150–400)
PMV BLD AUTO: 10.1 FL (ref 8.9–12.9)
POTASSIUM SERPL-SCNC: 4.8 MMOL/L (ref 3.5–5.1)
RBC # BLD AUTO: 4.09 M/UL (ref 3.8–5.2)
RBC MORPH BLD: ABNORMAL
SODIUM SERPL-SCNC: 138 MMOL/L (ref 136–145)
WBC # BLD AUTO: 9.8 K/UL (ref 3.6–11)

## 2024-02-11 PROCEDURE — 85025 COMPLETE CBC W/AUTO DIFF WBC: CPT

## 2024-02-11 PROCEDURE — 80048 BASIC METABOLIC PNL TOTAL CA: CPT

## 2024-02-11 PROCEDURE — 36415 COLL VENOUS BLD VENIPUNCTURE: CPT

## 2024-02-11 PROCEDURE — 6370000000 HC RX 637 (ALT 250 FOR IP): Performed by: FAMILY MEDICINE

## 2024-02-11 PROCEDURE — 2580000003 HC RX 258: Performed by: STUDENT IN AN ORGANIZED HEALTH CARE EDUCATION/TRAINING PROGRAM

## 2024-02-11 PROCEDURE — 1100000000 HC RM PRIVATE

## 2024-02-11 PROCEDURE — 6360000002 HC RX W HCPCS: Performed by: STUDENT IN AN ORGANIZED HEALTH CARE EDUCATION/TRAINING PROGRAM

## 2024-02-11 PROCEDURE — 51702 INSERT TEMP BLADDER CATH: CPT

## 2024-02-11 PROCEDURE — 6360000002 HC RX W HCPCS: Performed by: FAMILY MEDICINE

## 2024-02-11 PROCEDURE — 2580000003 HC RX 258: Performed by: FAMILY MEDICINE

## 2024-02-11 RX ADMIN — ALPRAZOLAM 0.25 MG: 0.25 TABLET ORAL at 21:16

## 2024-02-11 RX ADMIN — DEXAMETHASONE 6 MG: 4 TABLET ORAL at 08:01

## 2024-02-11 RX ADMIN — PREGABALIN 75 MG: 75 CAPSULE ORAL at 14:23

## 2024-02-11 RX ADMIN — TROSPIUM CHLORIDE 20 MG: 20 TABLET, FILM COATED ORAL at 21:16

## 2024-02-11 RX ADMIN — Medication 400 UNITS: at 08:01

## 2024-02-11 RX ADMIN — WATER 1000 MG: 1 INJECTION INTRAMUSCULAR; INTRAVENOUS; SUBCUTANEOUS at 10:02

## 2024-02-11 RX ADMIN — ENOXAPARIN SODIUM 40 MG: 100 INJECTION SUBCUTANEOUS at 08:01

## 2024-02-11 RX ADMIN — SODIUM CHLORIDE, PRESERVATIVE FREE 10 ML: 5 INJECTION INTRAVENOUS at 08:02

## 2024-02-11 RX ADMIN — DEXAMETHASONE 6 MG: 4 TABLET ORAL at 21:16

## 2024-02-11 RX ADMIN — CALCITONIN SALMON 1 SPRAY: 200 SPRAY, METERED NASAL at 14:23

## 2024-02-11 RX ADMIN — ATORVASTATIN CALCIUM 10 MG: 10 TABLET, FILM COATED ORAL at 08:01

## 2024-02-11 RX ADMIN — PREGABALIN 75 MG: 75 CAPSULE ORAL at 21:17

## 2024-02-11 RX ADMIN — PREGABALIN 75 MG: 75 CAPSULE ORAL at 08:01

## 2024-02-11 RX ADMIN — ALPRAZOLAM 0.25 MG: 0.25 TABLET ORAL at 08:01

## 2024-02-11 RX ADMIN — SODIUM CHLORIDE, PRESERVATIVE FREE 10 ML: 5 INJECTION INTRAVENOUS at 21:17

## 2024-02-11 RX ADMIN — LEVOTHYROXINE SODIUM 50 MCG: 50 TABLET ORAL at 07:18

## 2024-02-11 NOTE — PROGRESS NOTES
Hospitalist Progress Note  Sveta Juarez MD  Answering service: [phone] OR [phone] from in house phone        Date of Service:  2024  NAME:  Eugenia Weathers  :  1940  MRN:  [id number]      Admission Summary:   HPI: \"Eugenia Weathers is a 83 y.o. female who presents to the emergency room from [facility] health care and rehab.  Patient states that she has been feeling better for about 3 days, febrile this a.m. and tested positive for COVID today.  Patient with increasing lethargy and feeling very tired and noted fever of 101.3 at the rehab.  Patient also was noted to be hypoxic at the rehab.  Subsequently patient was sent to the emergency room.  Patient was hypoxic on room air and subsequently placed on 2 L nasal cannula.  Further workup includes chest x-ray which shows very mild left basilar atelectasis noted hyponatremic, hypokalemic and mild elevated creatinine.  Repeat COVID in the ER here is also positive.  Patient was started on Decadron and hospitalist service requested admit the patient for further evaluation management.     The patient denies any headache, blurry vision, sore throat, trouble swallowing, trouble with speech, chest pain, SOB, cough, fever, chills, N/V/D, abd pain, urinary symptoms, constipation, recent travels, sick contacts, focal or generalized neurological symptoms, falls, injuries, rashes, contact with COVID-19 diagnosed patients, hematemesis, melena, hemoptysis, hematuria, rashes, denies starting any new medications and denies any other concerns or problems besides as mentioned above.  \"       Interval history / Subjective:   Patient seen examined at bedside earlier stable on RA, no acute complaints      Assessment & Plan:      AHRF 2/2 COVID PNA  -Chest x-ray shows mild atelectasis but otherwise negative for acute pathology  -Decadron was started in the ER, continue, wean oxygen as tolerated,

## 2024-02-12 ENCOUNTER — TELEPHONE (OUTPATIENT)
Facility: CLINIC | Age: 84
End: 2024-02-12

## 2024-02-12 VITALS
SYSTOLIC BLOOD PRESSURE: 154 MMHG | TEMPERATURE: 97.5 F | HEART RATE: 56 BPM | RESPIRATION RATE: 17 BRPM | OXYGEN SATURATION: 91 % | HEIGHT: 62 IN | DIASTOLIC BLOOD PRESSURE: 74 MMHG | BODY MASS INDEX: 23.9 KG/M2 | WEIGHT: 129.85 LBS

## 2024-02-12 PROCEDURE — 6360000002 HC RX W HCPCS: Performed by: STUDENT IN AN ORGANIZED HEALTH CARE EDUCATION/TRAINING PROGRAM

## 2024-02-12 PROCEDURE — 97530 THERAPEUTIC ACTIVITIES: CPT

## 2024-02-12 PROCEDURE — 6370000000 HC RX 637 (ALT 250 FOR IP): Performed by: FAMILY MEDICINE

## 2024-02-12 PROCEDURE — 97530 THERAPEUTIC ACTIVITIES: CPT | Performed by: PHYSICAL THERAPIST

## 2024-02-12 PROCEDURE — 2580000003 HC RX 258: Performed by: FAMILY MEDICINE

## 2024-02-12 PROCEDURE — 6360000002 HC RX W HCPCS: Performed by: FAMILY MEDICINE

## 2024-02-12 PROCEDURE — 97535 SELF CARE MNGMENT TRAINING: CPT

## 2024-02-12 PROCEDURE — 2580000003 HC RX 258: Performed by: STUDENT IN AN ORGANIZED HEALTH CARE EDUCATION/TRAINING PROGRAM

## 2024-02-12 RX ORDER — CEFUROXIME AXETIL 250 MG/1
250 TABLET ORAL 2 TIMES DAILY
Qty: 8 TABLET | Refills: 0 | Status: SHIPPED
Start: 2024-02-12 | End: 2024-02-16

## 2024-02-12 RX ORDER — DEXAMETHASONE 6 MG/1
6 TABLET ORAL DAILY
Qty: 5 TABLET | Refills: 0 | Status: SHIPPED
Start: 2024-02-12 | End: 2024-02-17

## 2024-02-12 RX ADMIN — WATER 1000 MG: 1 INJECTION INTRAMUSCULAR; INTRAVENOUS; SUBCUTANEOUS at 09:37

## 2024-02-12 RX ADMIN — ATORVASTATIN CALCIUM 10 MG: 10 TABLET, FILM COATED ORAL at 09:36

## 2024-02-12 RX ADMIN — Medication 400 UNITS: at 09:36

## 2024-02-12 RX ADMIN — CALCITONIN SALMON 1 SPRAY: 200 SPRAY, METERED NASAL at 09:37

## 2024-02-12 RX ADMIN — PREGABALIN 75 MG: 75 CAPSULE ORAL at 09:36

## 2024-02-12 RX ADMIN — ALPRAZOLAM 0.25 MG: 0.25 TABLET ORAL at 09:36

## 2024-02-12 RX ADMIN — PREGABALIN 75 MG: 75 CAPSULE ORAL at 13:40

## 2024-02-12 RX ADMIN — DEXAMETHASONE 6 MG: 4 TABLET ORAL at 09:37

## 2024-02-12 RX ADMIN — ENOXAPARIN SODIUM 40 MG: 100 INJECTION SUBCUTANEOUS at 09:37

## 2024-02-12 RX ADMIN — SODIUM CHLORIDE, PRESERVATIVE FREE 10 ML: 5 INJECTION INTRAVENOUS at 09:37

## 2024-02-12 RX ADMIN — LEVOTHYROXINE SODIUM 50 MCG: 50 TABLET ORAL at 06:23

## 2024-02-12 NOTE — PLAN OF CARE
Problem: Discharge Planning  Goal: Discharge to home or other facility with appropriate resources  2/11/2024 0358 by Trena Ventura RN  Outcome: Progressing  2/10/2024 1823 by Braden King RN  Outcome: Progressing     Problem: Skin/Tissue Integrity  Goal: Absence of new skin breakdown  Description: 1.  Monitor for areas of redness and/or skin breakdown  2.  Assess vascular access sites hourly  3.  Every 4-6 hours minimum:  Change oxygen saturation probe site  4.  Every 4-6 hours:  If on nasal continuous positive airway pressure, respiratory therapy assess nares and determine need for appliance change or resting period.  2/11/2024 0358 by Trena Ventura RN  Outcome: Progressing  2/10/2024 1823 by Braden King RN  Outcome: Progressing     Problem: Safety - Adult  Goal: Free from fall injury  2/11/2024 0358 by Trena Ventura RN  Outcome: Progressing  2/10/2024 1823 by Braden King, RN  Outcome: Progressing     Problem: ABCDS Injury Assessment  Goal: Absence of physical injury  Outcome: Progressing     Problem: Pain  Goal: Verbalizes/displays adequate comfort level or baseline comfort level  Outcome: Progressing     Problem: Chronic Conditions and Co-morbidities  Goal: Patient's chronic conditions and co-morbidity symptoms are monitored and maintained or improved  2/11/2024 0358 by Trena Ventura RN  Outcome: Progressing  2/10/2024 1823 by Braden King, RN  Outcome: Progressing     
  Problem: Discharge Planning  Goal: Discharge to home or other facility with appropriate resources  2/11/2024 1053 by Braden King, RN  Outcome: Progressing  2/11/2024 0358 by Trena Ventura RN  Outcome: Progressing     Problem: Skin/Tissue Integrity  Goal: Absence of new skin breakdown  Description: 1.  Monitor for areas of redness and/or skin breakdown  2.  Assess vascular access sites hourly  3.  Every 4-6 hours minimum:  Change oxygen saturation probe site  4.  Every 4-6 hours:  If on nasal continuous positive airway pressure, respiratory therapy assess nares and determine need for appliance change or resting period.  2/11/2024 1053 by Braden King, RN  Outcome: Progressing  2/11/2024 0358 by Trena Ventura RN  Outcome: Progressing     Problem: Safety - Adult  Goal: Free from fall injury  2/11/2024 1053 by Braden King, RN  Outcome: Progressing  2/11/2024 0358 by Trena Ventura RN  Outcome: Progressing     Problem: ABCDS Injury Assessment  Goal: Absence of physical injury  2/11/2024 0358 by Trena Ventura RN  Outcome: Progressing     Problem: Pain  Goal: Verbalizes/displays adequate comfort level or baseline comfort level  2/11/2024 0358 by Trena Ventura RN  Outcome: Progressing     Problem: Chronic Conditions and Co-morbidities  Goal: Patient's chronic conditions and co-morbidity symptoms are monitored and maintained or improved  2/11/2024 1053 by Braden King, RN  Outcome: Progressing  2/11/2024 0358 by Trena Ventura RN  Outcome: Progressing     
  Problem: Discharge Planning  Goal: Discharge to home or other facility with appropriate resources  2/9/2024 1256 by Shannan Levy RN  Outcome: Progressing  Flowsheets (Taken 2/9/2024 0800)  Discharge to home or other facility with appropriate resources: Identify barriers to discharge with patient and caregiver  2/8/2024 2332 by Jairo Camarena RN  Outcome: Progressing     Problem: Skin/Tissue Integrity  Goal: Absence of new skin breakdown  Description: 1.  Monitor for areas of redness and/or skin breakdown  2.  Assess vascular access sites hourly  3.  Every 4-6 hours minimum:  Change oxygen saturation probe site  4.  Every 4-6 hours:  If on nasal continuous positive airway pressure, respiratory therapy assess nares and determine need for appliance change or resting period.  2/9/2024 1256 by Shannan Levy RN  Outcome: Progressing  2/8/2024 2332 by Jairo Camarena RN  Outcome: Progressing     Problem: Safety - Adult  Goal: Free from fall injury  Outcome: Progressing     Problem: ABCDS Injury Assessment  Goal: Absence of physical injury  Outcome: Progressing     Problem: Occupational Therapy - Adult  Goal: By Discharge: Performs self-care activities at highest level of function for planned discharge setting.  See evaluation for individualized goals.  Description: FUNCTIONAL STATUS PRIOR TO ADMISSION:  Patient unable to provide PLOF at time of evaluation. Per chart, pt was modified independent using a rollator for mobility. She was admitted from SNF where she was receiving PT/OT services.     HOME SUPPORT: At her baseline pt lives with her  (unable to assist). Pt unable to provide details on level of assist required for ADL tasks at SNF just prior to admission.     Occupational Therapy Goals:  Initiated 2/9/2024  1.  Patient will perform grooming routine, in unsupported sitting, with Supervision within 7 day(s).  2.  Patient will perform seated anterior upper and lower body bathing with Minimal Assist 
  Problem: Discharge Planning  Goal: Discharge to home or other facility with appropriate resources  2/9/2024 2325 by Jairo Camarena RN  Outcome: Progressing  2/9/2024 1256 by Shannan Levy RN  Outcome: Progressing  Flowsheets (Taken 2/9/2024 0800)  Discharge to home or other facility with appropriate resources: Identify barriers to discharge with patient and caregiver     Problem: Skin/Tissue Integrity  Goal: Absence of new skin breakdown  Description: 1.  Monitor for areas of redness and/or skin breakdown  2.  Assess vascular access sites hourly  3.  Every 4-6 hours minimum:  Change oxygen saturation probe site  4.  Every 4-6 hours:  If on nasal continuous positive airway pressure, respiratory therapy assess nares and determine need for appliance change or resting period.  2/9/2024 2325 by Jairo Camarena RN  Outcome: Progressing  2/9/2024 1256 by Shannan Levy RN  Outcome: Progressing     Problem: Safety - Adult  Goal: Free from fall injury  2/9/2024 2325 by Jairo Camarena RN  Outcome: Progressing  2/9/2024 1256 by Shannan Levy RN  Outcome: Progressing     Problem: ABCDS Injury Assessment  Goal: Absence of physical injury  2/9/2024 2325 by Jairo Camarena RN  Outcome: Progressing  2/9/2024 1256 by Shannan Levy RN  Outcome: Progressing     Problem: Occupational Therapy - Adult  Goal: By Discharge: Performs self-care activities at highest level of function for planned discharge setting.  See evaluation for individualized goals.  Description: FUNCTIONAL STATUS PRIOR TO ADMISSION:  Patient unable to provide PLOF at time of evaluation. Per chart, pt was modified independent using a rollator for mobility. She was admitted from SNF where she was receiving PT/OT services.     HOME SUPPORT: At her baseline pt lives with her  (unable to assist). Pt unable to provide details on level of assist required for ADL tasks at SNF just prior to admission.     Occupational Therapy Goals:  Initiated 
  Problem: Discharge Planning  Goal: Discharge to home or other facility with appropriate resources  Outcome: Progressing     Problem: Skin/Tissue Integrity  Goal: Absence of new skin breakdown  Description: 1.  Monitor for areas of redness and/or skin breakdown  2.  Assess vascular access sites hourly  3.  Every 4-6 hours minimum:  Change oxygen saturation probe site  4.  Every 4-6 hours:  If on nasal continuous positive airway pressure, respiratory therapy assess nares and determine need for appliance change or resting period.  Outcome: Progressing     
  Problem: Discharge Planning  Goal: Discharge to home or other facility with appropriate resources  Outcome: Progressing     Problem: Skin/Tissue Integrity  Goal: Absence of new skin breakdown  Description: 1.  Monitor for areas of redness and/or skin breakdown  2.  Assess vascular access sites hourly  3.  Every 4-6 hours minimum:  Change oxygen saturation probe site  4.  Every 4-6 hours:  If on nasal continuous positive airway pressure, respiratory therapy assess nares and determine need for appliance change or resting period.  Outcome: Progressing     Problem: Safety - Adult  Goal: Free from fall injury  Outcome: Progressing     Problem: ABCDS Injury Assessment  Goal: Absence of physical injury  Outcome: Progressing     Problem: Pain  Goal: Verbalizes/displays adequate comfort level or baseline comfort level  Outcome: Progressing     Problem: Chronic Conditions and Co-morbidities  Goal: Patient's chronic conditions and co-morbidity symptoms are monitored and maintained or improved  Outcome: Progressing     
  Problem: Discharge Planning  Goal: Discharge to home or other facility with appropriate resources  Outcome: Progressing     Problem: Skin/Tissue Integrity  Goal: Absence of new skin breakdown  Description: 1.  Monitor for areas of redness and/or skin breakdown  2.  Assess vascular access sites hourly  3.  Every 4-6 hours minimum:  Change oxygen saturation probe site  4.  Every 4-6 hours:  If on nasal continuous positive airway pressure, respiratory therapy assess nares and determine need for appliance change or resting period.  Outcome: Progressing     Problem: Safety - Adult  Goal: Free from fall injury  Outcome: Progressing     Problem: Chronic Conditions and Co-morbidities  Goal: Patient's chronic conditions and co-morbidity symptoms are monitored and maintained or improved  Outcome: Progressing     
  Problem: Occupational Therapy - Adult  Goal: By Discharge: Performs self-care activities at highest level of function for planned discharge setting.  See evaluation for individualized goals.  Description: FUNCTIONAL STATUS PRIOR TO ADMISSION:  Patient unable to provide PLOF at time of evaluation. Per chart, pt was modified independent using a rollator for mobility. She was admitted from [facility] where she was receiving PT/OT services.     HOME SUPPORT: At her baseline pt lives with her  (unable to assist). Pt unable to provide details on level of assist required for ADL tasks at SNF just prior to admission.     Occupational Therapy Goals:  Initiated 2/9/2024  1.  Patient will perform grooming routine, in unsupported sitting, with Supervision within 7 day(s).  2.  Patient will perform seated anterior upper and lower body bathing with Minimal Assist within 7 day(s).  3.  Patient will perform upper body dressing with Minimal Assist within 7 day(s).  4.  Patient will perform BSC transfers with Maximal Assist  within 7 day(s).  5.  Patient will perform all aspects of toileting with Maximal Assist within 7 day(s).  6.  Patient will participate in upper extremity therapeutic exercise/activities with Supervision for 3 minutes within 7 day(s).        Outcome: Progressing     OCCUPATIONAL THERAPY TREATMENT  Patient: Eugenia Weathers (83 y.o. female)  Date: 2/12/2024  Primary Diagnosis: Hypokalemia [E87.6]  Hypoxia [R09.02]  CYNTHIA (acute kidney injury) (HCC) [N17.9]  COVID-19 virus infection [U07.1]       Precautions: Fall Risk                Chart, occupational therapy assessment, plan of care, and goals were reviewed.    ASSESSMENT  Patient continues to benefit from skilled OT services and is progressing towards goals. Aaox1-2, pt knew she was at the hospital but unsure of situation. The patient was motivated and participatory today. She needed mod A x2 or bed mobility, was able to sit unsupported with UEs for brief periods of 
  Problem: Occupational Therapy - Adult  Goal: By Discharge: Performs self-care activities at highest level of function for planned discharge setting.  See evaluation for individualized goals.  Description: FUNCTIONAL STATUS PRIOR TO ADMISSION:  Patient unable to provide PLOF at time of evaluation. Per chart, pt was modified independent using a rollator for mobility. She was admitted from SNF where she was receiving PT/OT services.     HOME SUPPORT: At her baseline pt lives with her  (unable to assist). Pt unable to provide details on level of assist required for ADL tasks at SNF just prior to admission.     Occupational Therapy Goals:  Initiated 2/9/2024  1.  Patient will perform grooming routine, in unsupported sitting, with Supervision within 7 day(s).  2.  Patient will perform seated anterior upper and lower body bathing with Minimal Assist within 7 day(s).  3.  Patient will perform upper body dressing with Minimal Assist within 7 day(s).  4.  Patient will perform BSC transfers with Maximal Assist  within 7 day(s).  5.  Patient will perform all aspects of toileting with Maximal Assist within 7 day(s).  6.  Patient will participate in upper extremity therapeutic exercise/activities with Supervision for 3 minutes within 7 day(s).        Outcome: Progressing   OCCUPATIONAL THERAPY EVALUATION    Patient: Eugenia Weathers (83 y.o. female)  Date: 2/9/2024  Primary Diagnosis: Hypokalemia [E87.6]  Hypoxia [R09.02]  CYNTHIA (acute kidney injury) (HCC) [N17.9]  COVID-19 virus infection [U07.1]         Precautions: Fall Risk (Droplet +, COVID +)                  ASSESSMENT :  The patient's performance of ADL/IADL tasks is limited at this time by impaired sitting/standing balance, activity tolerance, generalized weakness, coordination (severe tremors), and cognition (insight, safety awareness, orientation, attention, memory) s/p admission from SNF for SOB and lethargy. Pt found with COVID-19 infection on admission.     Pt 
  Problem: Pain  Goal: Verbalizes/displays adequate comfort level or baseline comfort level  2/12/2024 1310 by Fouzia Bella, RN  Outcome: Progressing  2/12/2024 0144 by Francisca Polk, RN  Outcome: Progressing     
  Problem: Physical Therapy - Adult  Goal: By Discharge: Performs mobility at highest level of function for planned discharge setting.  See evaluation for individualized goals.  Description: FUNCTIONAL STATUS PRIOR TO ADMISSION: Pt unreliable historian. Per chart review: pt was using a rollator, history of falls. Pt was admitted from a SNF and unable to report functional status while at SNF    HOME SUPPORT PRIOR TO ADMISSION: pt lived with spouse, but per chart review pt unable to assist due to history of CVA    Physical Therapy Goals  Initiated 2/9/2024  1.  Patient will move from supine to sit and sit to supine and roll side to side in bed with minimal assistance within 7 day(s).    2.  Patient will perform sit to stand with moderate assistance within 7 day(s).  3.  Patient will transfer from bed to chair and chair to bed with moderate assistance using the least restrictive device within 7 day(s).  4.  Patient will ambulate with moderate assistance for 5 feet with the least restrictive device within 7 day(s).       Outcome: Progressing   PHYSICAL THERAPY TREATMENT    Patient: Eugenia Weathers (83 y.o. female)  Date: 2/12/2024  Diagnosis: Hypokalemia [E87.6]  Hypoxia [R09.02]  CYNTHIA (acute kidney injury) (HCC) [N17.9]  COVID-19 virus infection [U07.1] Hypoxia      Precautions: Fall Risk                      ASSESSMENT:  Patient continues to benefit from skilled PT services and is progressing towards goals. Patient appears less confused today but is oriented to self and [place] but seems to think she is in her Drs Office.  Overall requires modA to come to the EOB with initially fair balance.  Improves once feet on the floor and sits with good static balance.  Sit to stand with modA x 2 and she is extremely tremulous in standing.  Requires significant assistance to maintain standing secondary to poor balance.  Able to take 2 small side steps with RW and modA x 2.  Patient continues to be significantly below functional 
repetition  Attention Span: Attends with cues to redirect  Memory: Decreased recall of biographical Information;Decreased recall of precautions;Decreased recall of recent events;Decreased short term memory  Safety Judgement: Decreased awareness of need for assistance;Decreased awareness of need for safety  Problem Solving: Decreased awareness of errors;Assistance required to implement solutions;Assistance required to generate solutions  Insights: Decreased awareness of deficits  Initiation: Requires cues for some  Sequencing: Requires cues for some        Vision/Perceptual:    Vision - Basic Assessment  Prior Vision: No visual deficits  Visual History: No significant visual history  Patient Visual Report: No visual complaint reported.  Visual Field Cut: No  Oculo Motor Control: WNL        Perception  Overall Perceptual Status: WFL    Strength:    Strength: Generally decreased, functional    Tone & Sensation:   Tone:  (UE and LE tremors)  Sensation: Intact    Coordination:  Coordination: Generally decreased, functional    Range Of Motion:  AROM: Generally decreased, functional       Functional Mobility:  Bed Mobility:     Bed Mobility Training  Bed Mobility Training: Yes  Supine to Sit: Moderate assistance;Additional time  Sit to Supine: Moderate assistance  Scooting: Minimum assistance;Additional time  Transfers:     Transfer Training  Transfer Training: Yes  Stand to Sit: Maximum assistance;Assist X2 (unable to achieve full standing posture)  Balance:               Balance  Sitting: Impaired  Sitting - Static: Fair (occasional)  Sitting - Dynamic: Fair (occasional)  Standing: Impaired  Standing - Static: Constant support;Poor  Standing - Dynamic: Not tested                                                                                                                                                                                                                                               [facility]

## 2024-02-12 NOTE — PROGRESS NOTES
Bedside shift change report given to ELDA Fragoso (oncoming nurse) by ELDA Espinosa (offgoing nurse). Report included the following information Nurse Handoff Report, Index, ED Encounter Summary, ED SBAR, Adult Overview, Surgery Report, Intake/Output, MAR, Recent Results, Med Rec Status, Alarm Parameters, Quality Measures, and Neuro Assessment.

## 2024-02-12 NOTE — TELEPHONE ENCOUNTER
The patient's spouse Nimesh is calling to advise she has been admitted to [facility]. She has Covid. Will be transferred to [facility] for rehab after discharge. He does not know if he has Covid. He does not have a fever but is very tired.  # [phone]

## 2024-02-12 NOTE — CARE COORDINATION
02/11/24 1423   Readmission Assessment   Number of Days since last admission? 8-30 days   Previous Disposition SNF   Who is being Interviewed Caregiver  ()   What was the patient's/caregiver's perception as to why they think they needed to return back to the hospital? Other (Comment)  (new medical problem)   Did you visit your Primary Care Physician after you left the hospital, before you returned this time? No   Why weren't you able to visit your PCP? Other (Comment)  (patient in SNF)   Did you see a specialist, such as Cardiac, Pulmonary, Orthopedic Physician, etc. after you left the hospital? No   Who advised the patient to return to the hospital? Skilled Unit   Does the patient report anything that got in the way of taking their medications? No   In our efforts to provide the best possible care to you and others like you, can you think of anything that we could have done to help you after you left the hospital the first time, so that you might not have needed to return so soon? Other (Comment)  (no concerns)     VIRGINIA CALIW   
CM obtained the PT and OT notes from today and faxed them to [facility] to update the original auth request for SNF. Tish Rod,MALACHI,ACM-SW  
SHAWNA- D/c to [facility]  Transport- BLS ([facility] @ 4:30pm)     Nursing-Call report [phone]          KENDALL received a message from [employer] with an approval for SNF at [facility]. The auth number is [id number] starting 2/12/24 through 2/14/24. The [employer] reference number is [id number]. KENDALL spoke with Lolis with [facility] to give her this information. She said that now, the pt will need to go to [facility], as another COVID pt was identified at [facility] and they are unable to accept her there ([place]) today. She said that once a bed is available at [facility], pt will be transferred from [facility] to [facility].    KENDALL spoke with pt and her  to inform them of the change in facility and both are in agreement with the change. KENDALL called [employer] and had the auth changed from [facility] to [facility]. Lolis with [place] notified. She accepted pt at [facility] today.    KENDALL called [facility] to set up BLS transportation for this pt going to [facility].They will be able to pick this pt up today at 4:30pm.    An envelope containing pt's AVS and MAR will be sent with this pt to [facility]. Also, pt's nurse will call report. MALACHI Lyman,ACM-SW  
SHAWNA- D/c to [facility] pending insurance auth.    KENDALL noted that this pt has been accepted to [facility] and [facility]. CM spoke with her  to discuss and he would like her to go to [facility].Kendall spoke with Lolis ([phone]) with [facility] and she can accept this pt at [place] when auth is obtained.    The following information was submitted to [facility] for initial authorization:  Face Sheet/Demographics    (Include: Usual living situation)  History and Physical  Last 24 hours of MD and RN notes   (Include: Current Level of Functioning; cognitive status)  PT/OT/ST Evaluations and/or notes within the last 48 hours  MAR  MD orders  (Include: Attending or ordering MD’s name and phone #; skilled nursing needs;   Wound care/etc)  Projected Date of Transfer to SNF  Requested SNF  SNF Point of Contact and Phone #  CM Point of Contact and Phone #  NPI # for SNF     MALACHI Lyman,ACM-SW  
associated with the providers?  Yes

## 2024-02-12 NOTE — PROGRESS NOTES
Report called to Mitchel Wong at [facility]. Discharge packet includes AVS and MAR.

## 2024-02-12 NOTE — DISCHARGE SUMMARY
DISCHARGE DIAGNOSES / PLAN:      AHRF 2/2 COVID PNA  -Chest x-ray shows mild atelectasis but otherwise negative for acute pathology  -Decadron was started in the ER, continue complete 10 days total, wean oxygen as tolerated, droplet isolation  -now stable on RA      Urinary retention  UTI?  - Straight cath x 2, Becerril placed 2/9, consulted urology  -urology recommended empiric treatment for UTI, urine cx g-s, s/p rocephin while ip dc with po ceftin to complete 5 days total   -fu op with urology becerril to remain on dc     Acute metabolic encephalopathy  -Multifactorial, due to COVID-19 infection, hypoxia as well as dehydration  -Manage underlying etiologies, monitor mental status, seems back to baseline     Mild elevated creatinine  -normalized avoid excess IV fluid with COVID-positive status DC ivf cont good po intake     Hypovolemic hyponatremia  -improved monitor      Hyperglycemia  -No recorded history of diabetes, could be exacerbated by steroids  -Check hemoglobin A1c, start sliding scale insulin coverage while on steroids      Dyslipidemia  -Continue statin     Hypothyroidism  -Continue Synthroid     Anxiety  -Continue Xanax     Breast mass  - Follow-up outpatient with PCP/oncology     PT OT recommending SNF patient in agreement.  Case management consult for DC planning           Code status: Full discussed with patient   Prophylaxis: Lovenox subcu  Care Plan discussed with: Patient/family, nurse, case management  Anticipated Disposition: dc to snf today          FOLLOW UP APPOINTMENTS:    Follow-up Information       Follow up With Specialties Details Why Contact [facility]  Follow up  [facility]  [address] [address] [phone]    [facility]   [address] [facility] [id number] [phone]    Deysi Ozuna APRN - NP Nurse Practitioner Call in 1 day(s)  [address]

## 2024-02-13 ENCOUNTER — OFFICE VISIT (OUTPATIENT)
Facility: CLINIC | Age: 84
End: 2024-02-13
Payer: MEDICARE

## 2024-02-13 ENCOUNTER — TELEPHONE (OUTPATIENT)
Facility: CLINIC | Age: 84
End: 2024-02-13

## 2024-02-13 DIAGNOSIS — U07.1 COVID: Primary | ICD-10-CM

## 2024-02-13 LAB
BACTERIA SPEC CULT: ABNORMAL
BACTERIA SPEC CULT: ABNORMAL
CC UR VC: ABNORMAL
SERVICE CMNT-IMP: ABNORMAL

## 2024-02-13 PROCEDURE — 99306 1ST NF CARE HIGH MDM 50: CPT | Performed by: INTERNAL MEDICINE

## 2024-02-13 PROCEDURE — G8484 FLU IMMUNIZE NO ADMIN: HCPCS | Performed by: INTERNAL MEDICINE

## 2024-02-13 PROCEDURE — 1123F ACP DISCUSS/DSCN MKR DOCD: CPT | Performed by: INTERNAL MEDICINE

## 2024-02-13 NOTE — TELEPHONE ENCOUNTER
Facility Change - Nimesh Core called to update eDysi Ozuna NP that the patient was moved to [facility] and from there she will go to [facility].  Nimesh states that the patient tested positive for covid, is in isolation at [facility] and will be moved when a bed is available at [facility].  Nimesh will contact [facility] when the patient is discharged.    Nimesh' callback if needed is [phone]

## 2024-02-13 NOTE — PROGRESS NOTES
PLACE OF SERVICE:  [facility] [address] [place] [address]     H & P    2/13/2024    Chief Complaint:    Acute hypoxic respiratory failure  COVID-19  Urinary retention    HPI : Patient is a 83-year-old female past medical history of spinal stenosis hypothyroidism who was undergoing skilled rehab at another facility.  Patient was brought to the emergency room with 3 days history of cough fever and chest congestion.  She had a fever 10 to 1.3 degrees at the facility.  Patient was hypoxic in the emergency room she was placed on 2 L nasal cannula chest x-ray revealed mild left basilar atelectasis COVID rapid antigen test in the emergency room was positive.  Pertinent labs sodium is 134 potassium 3.2 BUN was 25 creatinine was 1.26.  She was admitted with diagnosis of acute respiratory failure COVID-19 dehydration and metabolic encephalopathy.  Patient was started on Decadron IV fluids and IV Rocephin for possible UTI.  She had urine retention urology evaluated the patient recommended indwelling Ott and follow-up as outpatient.  Her urine culture was negative and she is instructed to continue with Ceftin for total 5-day course.  Patient is debilitated weak and admitted here for skilled rehab.  She continues to be in contact droplet isolation.  Upon arrival she was questioning why she has a indwelling Ott catheter while she is in the facility.  She wants to go home.  Explained to her because of COVID-19 and isolation protocol.  She is a poor historian history is taken from review records from the hospital time spent 50 minutes on admission encounter today.    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 Del Rio  Spinal stenosis  Anxiety  Hypothyroidism        ROS:   The following system review was negative:  Constitutional; Respiratory; Cardiovascular; Genitourinary; Gastrointestinal; Psychiatric; Ear-Nose-Throat;

## 2024-02-15 ENCOUNTER — OFFICE VISIT (OUTPATIENT)
Facility: CLINIC | Age: 84
End: 2024-02-15
Payer: MEDICARE

## 2024-02-15 DIAGNOSIS — U07.1 COVID: Primary | ICD-10-CM

## 2024-02-15 PROCEDURE — 99309 SBSQ NF CARE MODERATE MDM 30: CPT | Performed by: INTERNAL MEDICINE

## 2024-02-15 PROCEDURE — G8484 FLU IMMUNIZE NO ADMIN: HCPCS | Performed by: INTERNAL MEDICINE

## 2024-02-15 PROCEDURE — 1123F ACP DISCUSS/DSCN MKR DOCD: CPT | Performed by: INTERNAL MEDICINE

## 2024-02-15 NOTE — PROGRESS NOTES
PLACE OF SERVICE:  [facility] [address] , [place] [address]     SKILLED VISIT    2/15/2024      Chief Complaint:    Acute hypoxic respiratory failure  COVID-19  Urinary retention    HPI : Patient is a 83-year-old female past medical history of spinal stenosis hypothyroidism who was undergoing skilled rehab at another facility.  Patient was brought to the emergency room with 3 days history of cough fever and chest congestion.  She had a fever 10 to 1.3 degrees at the facility.  Patient was hypoxic in the emergency room she was placed on 2 L nasal cannula chest x-ray revealed mild left basilar atelectasis COVID rapid antigen test in the emergency room was positive.  Pertinent labs sodium is 134 potassium 3.2 BUN was 25 creatinine was 1.26.  She was admitted with diagnosis of acute respiratory failure COVID-19 dehydration and metabolic encephalopathy.  Patient was started on Decadron IV fluids and IV Rocephin for possible UTI.  She had urine retention urology evaluated the patient recommended indwelling Tot and follow-up as outpatient.  Her urine culture was negative and she is instructed to continue with Ceftin for total 5-day course.  Patient is debilitated weak and admitted here for skilled rehab.    Patient is seen for skilled visit.  She is in the contact droplet isolation she is sitting in the bed is not on O2 afebrile not hypoxic.  Eating well.  I discussed with her her diagnosis of breast cancer she wants to ignore and does not want to concentrate on it.  Still has indwelling Ott catheter.    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 Del Rio  Spinal stenosis  Anxiety  Hypothyroidism        ROS:   The following system review was negative:  Constitutional; Respiratory; Cardiovascular; Genitourinary; Gastrointestinal; Psychiatric; Ear-Nose-Throat; Musculoskeletal; Neurologic; Endocrine; Hematologic; Skin; Eyes; denies

## 2024-02-16 ENCOUNTER — SOCIAL WORK (OUTPATIENT)
Facility: CLINIC | Age: 84
End: 2024-02-16

## 2024-02-17 NOTE — PROGRESS NOTES
Social Determinants of Health screening updated.     YAZMIN Maxwell, LCSW  Licensed Clinical   [facility]  (O) [phone]  (C) [phone]

## 2024-02-19 ENCOUNTER — OFFICE VISIT (OUTPATIENT)
Facility: CLINIC | Age: 84
End: 2024-02-19
Payer: MEDICARE

## 2024-02-19 DIAGNOSIS — U07.1 COVID: Primary | ICD-10-CM

## 2024-02-19 PROCEDURE — 99309 SBSQ NF CARE MODERATE MDM 30: CPT | Performed by: INTERNAL MEDICINE

## 2024-02-19 PROCEDURE — G8484 FLU IMMUNIZE NO ADMIN: HCPCS | Performed by: INTERNAL MEDICINE

## 2024-02-19 PROCEDURE — 1123F ACP DISCUSS/DSCN MKR DOCD: CPT | Performed by: INTERNAL MEDICINE

## 2024-02-20 NOTE — PROGRESS NOTES
PLACE OF SERVICE:  [facility] [address] , [place] [address]     SKILLED VISIT    2/19/2024      Chief Complaint:    Acute hypoxic respiratory failure  COVID-19  Urinary retention    HPI : Patient is a 83-year-old female past medical history of spinal stenosis hypothyroidism who was undergoing skilled rehab at another facility.  Patient was brought to the emergency room with 3 days history of cough fever and chest congestion.  She had a fever 10 to 1.3 degrees at the facility.  Patient was hypoxic in the emergency room she was placed on 2 L nasal cannula chest x-ray revealed mild left basilar atelectasis COVID rapid antigen test in the emergency room was positive.  Pertinent labs sodium is 134 potassium 3.2 BUN was 25 creatinine was 1.26.  She was admitted with diagnosis of acute respiratory failure COVID-19 dehydration and metabolic encephalopathy.  Patient was started on Decadron IV fluids and IV Rocephin for possible UTI.  She had urine retention urology evaluated the patient recommended indwelling Ott and follow-up as outpatient.  Her urine culture was negative and she is instructed to continue with Ceftin for total 5-day course.  Patient is debilitated weak and admitted here for skilled rehab.    She is seen for skilled visit.  She is out of contact droplet isolation has been weaned off of the oxygen.  Her  is in the room updated about her condition she has appointment with oncology and a PET scan next week.  Attempt a voiding trial tomorrow    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 Del Rio  Spinal stenosis  Anxiety  Hypothyroidism        ROS:   The following system review was negative:  Constitutional; Respiratory; Cardiovascular; Genitourinary; Gastrointestinal; Psychiatric; Ear-Nose-Throat; Musculoskeletal; Neurologic; Endocrine; Hematologic; Skin; Eyes; denies any    Medications: Reviewed in EMR and

## 2024-02-21 ENCOUNTER — OFFICE VISIT (OUTPATIENT)
Facility: CLINIC | Age: 84
End: 2024-02-21
Payer: MEDICARE

## 2024-02-21 DIAGNOSIS — U07.1 COVID: Primary | ICD-10-CM

## 2024-02-21 PROCEDURE — G8484 FLU IMMUNIZE NO ADMIN: HCPCS | Performed by: INTERNAL MEDICINE

## 2024-02-21 PROCEDURE — 1123F ACP DISCUSS/DSCN MKR DOCD: CPT | Performed by: INTERNAL MEDICINE

## 2024-02-21 PROCEDURE — 99309 SBSQ NF CARE MODERATE MDM 30: CPT | Performed by: INTERNAL MEDICINE

## 2024-02-22 ENCOUNTER — HOSPITAL ENCOUNTER (OUTPATIENT)
Facility: HOSPITAL | Age: 84
End: 2024-02-22
Attending: INTERNAL MEDICINE
Payer: MEDICARE

## 2024-02-22 ENCOUNTER — HOSPITAL ENCOUNTER (OUTPATIENT)
Facility: HOSPITAL | Age: 84
Discharge: HOME | End: 2024-02-22
Attending: INTERNAL MEDICINE
Payer: MEDICARE

## 2024-02-22 DIAGNOSIS — C50.411 MALIGNANT NEOPLASM OF UPPER-OUTER QUADRANT OF RIGHT FEMALE BREAST, UNSPECIFIED ESTROGEN RECEPTOR STATUS (HCC): ICD-10-CM

## 2024-02-22 DIAGNOSIS — C79.52 SECONDARY MALIGNANT NEOPLASM OF BONE AND BONE MARROW (HCC): ICD-10-CM

## 2024-02-22 DIAGNOSIS — C79.51 SECONDARY MALIGNANT NEOPLASM OF BONE AND BONE MARROW (HCC): ICD-10-CM

## 2024-02-22 LAB
GLUCOSE BLD STRIP.AUTO-MCNC: 100 MG/DL (ref 65–117)
SERVICE CMNT-IMP: NORMAL

## 2024-02-22 PROCEDURE — 78815 PET IMAGE W/CT SKULL-THIGH: CPT

## 2024-02-22 PROCEDURE — 82962 GLUCOSE BLOOD TEST: CPT

## 2024-02-22 PROCEDURE — A9609 HC RX DIAGNOSTIC RADIOPHARMACEUTICAL: HCPCS | Performed by: INTERNAL MEDICINE

## 2024-02-22 PROCEDURE — 3430000000 HC RX DIAGNOSTIC RADIOPHARMACEUTICAL: Performed by: INTERNAL MEDICINE

## 2024-02-22 RX ORDER — FLUDEOXYGLUCOSE F-18 500 MCI/ML
10 INJECTION INTRAVENOUS ONCE
Status: COMPLETED | OUTPATIENT
Start: 2024-02-22 | End: 2024-02-22

## 2024-02-22 RX ADMIN — FLUDEOXYGLUCOSE F-18 10 MILLICURIE: 500 INJECTION INTRAVENOUS at 13:40

## 2024-02-22 NOTE — PROGRESS NOTES
PLACE OF SERVICE:  [facility] [address] , [place] [address]     SKILLED VISIT    2/21/2024      Chief Complaint:    Acute hypoxic respiratory failure  COVID-19  Urinary retention    HPI : Patient is a 83-year-old female past medical history of spinal stenosis hypothyroidism who was undergoing skilled rehab at another facility.  Patient was brought to the emergency room with 3 days history of cough fever and chest congestion.  She had a fever 10 to 1.3 degrees at the facility.  Patient was hypoxic in the emergency room she was placed on 2 L nasal cannula chest x-ray revealed mild left basilar atelectasis COVID rapid antigen test in the emergency room was positive.  Pertinent labs sodium is 134 potassium 3.2 BUN was 25 creatinine was 1.26.  She was admitted with diagnosis of acute respiratory failure COVID-19 dehydration and metabolic encephalopathy.  Patient was started on Decadron IV fluids and IV Rocephin for possible UTI.  She had urine retention urology evaluated the patient recommended indwelling Ott and follow-up as outpatient.  Her urine culture was negative and she is instructed to continue with Ceftin for total 5-day course.  Patient is debilitated weak and admitted here for skilled rehab.    She is seen for skilled visit.  Patient has been working with PT OT clinically much improved.  She has been weaned off oxygen.    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 Del Rio  Spinal stenosis  Anxiety  Hypothyroidism        ROS:   The following system review was negative:  Constitutional; Respiratory; Cardiovascular; Genitourinary; Gastrointestinal; Psychiatric; Ear-Nose-Throat; Musculoskeletal; Neurologic; Endocrine; Hematologic; Skin; Eyes; denies any    Medications: Reviewed in EMR and assessment and plan    Social History:   Family History: Noncontributory     Code Status: Full code    Allergies:

## 2024-02-23 ENCOUNTER — OFFICE VISIT (OUTPATIENT)
Facility: CLINIC | Age: 84
End: 2024-02-23

## 2024-02-23 DIAGNOSIS — U07.1 COVID: Primary | ICD-10-CM

## 2024-02-24 NOTE — PROGRESS NOTES
PLACE OF SERVICE:  [facility] [address] , [place] [address]       2/23/24      Chief Complaint:    Acute hypoxic respiratory failure  COVID-19  Urinary retention    HPI : Patient is a 83-year-old female past medical history of spinal stenosis hypothyroidism who was undergoing skilled rehab at another facility.  Patient was brought to the emergency room with 3 days history of cough fever and chest congestion.  She had a fever 10 to 1.3 degrees at the facility.  Patient was hypoxic in the emergency room she was placed on 2 L nasal cannula chest x-ray revealed mild left basilar atelectasis COVID rapid antigen test in the emergency room was positive.  Pertinent labs sodium is 134 potassium 3.2 BUN was 25 creatinine was 1.26.  She was admitted with diagnosis of acute respiratory failure COVID-19 dehydration and metabolic encephalopathy.  Patient was started on Decadron IV fluids and IV Rocephin for possible UTI.  She had urine retention urology evaluated the patient recommended indwelling Ott and follow-up as outpatient.  Her urine culture was negative and she is instructed to continue with Ceftin for total 5-day course.  Patient is debilitated weak and admitted here for skilled rehab.    She is seen for skilled visit.  Ott has been removed she has been voiding well.  She is working well with PT OT denies fever no chills no chest pain.      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 Del Rio  Spinal stenosis  Anxiety  Hypothyroidism        ROS:   The following system review was negative:  Constitutional; Respiratory; Cardiovascular; Genitourinary; Gastrointestinal; Psychiatric; Ear-Nose-Throat; Musculoskeletal; Neurologic; Endocrine; Hematologic; Skin; Eyes; denies any    Medications: Reviewed in EMR and assessment and plan    Social History:   Family History: Noncontributory     Code Status: Full code    Allergies:

## 2024-02-27 ENCOUNTER — TELEPHONE (OUTPATIENT)
Facility: CLINIC | Age: 84
End: 2024-02-27

## 2024-02-27 NOTE — TELEPHONE ENCOUNTER
Call returned to Mr. Weathers regarding his wife.  He relayed that they both saw Dr. Del Rio and he was not optimistic about her treatment.  He felt that she was not strong enough to tolerate chemo and finally told them she had about 6 months.  He said that patient was \"in shock\" and he was \"numb.\"  He is having a hard time that she is away at a facility, and he thinks she is too overwhelmed to process the information.  They do plan to follow up with Dr. Del Rio 3/18.  He does not know if/when wife will be discharged from the facility.     Offered to speak to team SW for support, and he said he would like that.

## 2024-02-27 NOTE — TELEPHONE ENCOUNTER
The patient's spouse has just been advised she has stage IV cancer in breast, lungs and lymph nodes. Has been given 6 months to live. He is upset and would like to speak with Maria Guadalupe. CB #

## 2024-02-29 ENCOUNTER — OFFICE VISIT (OUTPATIENT)
Facility: CLINIC | Age: 84
End: 2024-02-29

## 2024-02-29 ENCOUNTER — TELEPHONE (OUTPATIENT)
Facility: CLINIC | Age: 84
End: 2024-02-29

## 2024-02-29 DIAGNOSIS — M62.81 MUSCLE WEAKNESS: Primary | ICD-10-CM

## 2024-02-29 NOTE — TELEPHONE ENCOUNTER
Appeal Approved - Nimesh Core called to update Deysi Ozuna NP that the patient's appeal was approved and she will stay at [facility] for PT.  Nimesh' callback is [phone]

## 2024-03-01 NOTE — PROGRESS NOTES
PLACE OF SERVICE:  [facility] [address] [place] [address]    SKILLED VISIT    2/29/2024      Chief Complaint:    Acute hypoxic respiratory failure  COVID-19  Urinary retention    HPI : Patient is a 83-year-old female past medical history of spinal stenosis hypothyroidism who was undergoing skilled rehab at another facility.  Patient was brought to the emergency room with 3 days history of cough fever and chest congestion.  She had a fever 10 to 1.3 degrees at the facility.  Patient was hypoxic in the emergency room she was placed on 2 L nasal cannula chest x-ray revealed mild left basilar atelectasis COVID rapid antigen test in the emergency room was positive.  Pertinent labs sodium is 134 potassium 3.2 BUN was 25 creatinine was 1.26.  She was admitted with diagnosis of acute respiratory failure COVID-19 dehydration and metabolic encephalopathy.  Patient was started on Decadron IV fluids and IV Rocephin for possible UTI.  She had urine retention urology evaluated the patient recommended indwelling Ott and follow-up as outpatient.  Her urine culture was negative and she is instructed to continue with Ceftin for total 5-day course.  Patient is debilitated weak and admitted here for skilled rehab.      Patient is seen for skilled visit.  She had appointment with Dr. Del Rio oncology.  I talked to Dr. Del Rio today patient has metastatic breast cancer prognosis is poor.  She is not a candidate for chemo overall because of her health condition.  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

## 2024-03-04 ENCOUNTER — OFFICE VISIT (OUTPATIENT)
Facility: CLINIC | Age: 84
End: 2024-03-04
Payer: MEDICARE

## 2024-03-04 DIAGNOSIS — U07.1 COVID: Primary | ICD-10-CM

## 2024-03-04 PROCEDURE — 1123F ACP DISCUSS/DSCN MKR DOCD: CPT | Performed by: INTERNAL MEDICINE

## 2024-03-04 PROCEDURE — G8484 FLU IMMUNIZE NO ADMIN: HCPCS | Performed by: INTERNAL MEDICINE

## 2024-03-04 PROCEDURE — 99309 SBSQ NF CARE MODERATE MDM 30: CPT | Performed by: INTERNAL MEDICINE

## 2024-03-05 NOTE — PROGRESS NOTES
here for skilled rehab  PT OT eval and treat    Urinary retention  Resolved voiding well    Invasive ductal carcinoma of the right breast  PET scan positive for lingular and left lung mass metastasis.  Poor prognosis but per Dr. Del Rio    Spinal stenosis and neuropathy  Continue Lyrica 75 mg 3 times daily and tramadol as needed    Hypothyroidism continue levothyroxine 75 mcg daily    Anxiety continue alprazolam 0.25 mg daily as needed      Prem Lorenz MD

## 2024-03-15 ENCOUNTER — HOSPITAL ENCOUNTER (INPATIENT)
Facility: HOSPITAL | Age: 84
LOS: 3 days | Discharge: HOSPICE | DRG: 690 | End: 2024-03-21
Attending: EMERGENCY MEDICINE | Admitting: INTERNAL MEDICINE
Payer: MEDICARE

## 2024-03-15 DIAGNOSIS — R53.81 DEBILITY: ICD-10-CM

## 2024-03-15 DIAGNOSIS — C50.919 METASTASIS FROM BREAST CANCER (HCC): ICD-10-CM

## 2024-03-15 DIAGNOSIS — C79.9 METASTASIS FROM BREAST CANCER (HCC): ICD-10-CM

## 2024-03-15 DIAGNOSIS — N39.0 ACUTE UTI (URINARY TRACT INFECTION): Primary | ICD-10-CM

## 2024-03-15 PROBLEM — N30.01 ACUTE CYSTITIS WITH HEMATURIA: Status: ACTIVE | Noted: 2024-03-15

## 2024-03-15 LAB
ANION GAP SERPL CALC-SCNC: 2 MMOL/L (ref 5–15)
APPEARANCE UR: ABNORMAL
BACTERIA URNS QL MICRO: ABNORMAL /HPF
BILIRUB UR QL: NEGATIVE
BUN SERPL-MCNC: 24 MG/DL (ref 6–20)
BUN/CREAT SERPL: 23 (ref 12–20)
CALCIUM SERPL-MCNC: 9.2 MG/DL (ref 8.5–10.1)
CHLORIDE SERPL-SCNC: 112 MMOL/L (ref 97–108)
CO2 SERPL-SCNC: 29 MMOL/L (ref 21–32)
COLOR UR: ABNORMAL
CREAT SERPL-MCNC: 1.06 MG/DL (ref 0.55–1.02)
EPITH CASTS URNS QL MICRO: ABNORMAL /LPF
ERYTHROCYTE [DISTWIDTH] IN BLOOD BY AUTOMATED COUNT: 14.4 % (ref 11.5–14.5)
GLUCOSE SERPL-MCNC: 105 MG/DL (ref 65–100)
GLUCOSE UR STRIP.AUTO-MCNC: NEGATIVE MG/DL
HCT VFR BLD AUTO: 35.2 % (ref 35–47)
HGB BLD-MCNC: 11 G/DL (ref 11.5–16)
HGB UR QL STRIP: ABNORMAL
KETONES UR QL STRIP.AUTO: NEGATIVE MG/DL
LACTATE SERPL-SCNC: 0.8 MMOL/L (ref 0.4–2)
LEUKOCYTE ESTERASE UR QL STRIP.AUTO: ABNORMAL
MAGNESIUM SERPL-MCNC: 2.1 MG/DL (ref 1.6–2.4)
MCH RBC QN AUTO: 28.9 PG (ref 26–34)
MCHC RBC AUTO-ENTMCNC: 31.3 G/DL (ref 30–36.5)
MCV RBC AUTO: 92.4 FL (ref 80–99)
MUCOUS THREADS URNS QL MICRO: ABNORMAL /LPF
NITRITE UR QL STRIP.AUTO: POSITIVE
NRBC # BLD: 0 K/UL (ref 0–0.01)
NRBC BLD-RTO: 0 PER 100 WBC
PH UR STRIP: 5.5 (ref 5–8)
PLATELET # BLD AUTO: 290 K/UL (ref 150–400)
PMV BLD AUTO: 9.7 FL (ref 8.9–12.9)
POTASSIUM SERPL-SCNC: 3.9 MMOL/L (ref 3.5–5.1)
PROCALCITONIN SERPL-MCNC: 0.13 NG/ML
PROT UR STRIP-MCNC: 30 MG/DL
RBC # BLD AUTO: 3.81 M/UL (ref 3.8–5.2)
RBC #/AREA URNS HPF: ABNORMAL /HPF (ref 0–5)
SODIUM SERPL-SCNC: 143 MMOL/L (ref 136–145)
SP GR UR REFRACTOMETRY: 1.02
URINE CULTURE IF INDICATED: ABNORMAL
UROBILINOGEN UR QL STRIP.AUTO: 0.2 EU/DL (ref 0.2–1)
WBC # BLD AUTO: 9.1 K/UL (ref 3.6–11)
WBC URNS QL MICRO: ABNORMAL /HPF (ref 0–4)

## 2024-03-15 PROCEDURE — G0378 HOSPITAL OBSERVATION PER HR: HCPCS

## 2024-03-15 PROCEDURE — 87086 URINE CULTURE/COLONY COUNT: CPT

## 2024-03-15 PROCEDURE — 2580000003 HC RX 258: Performed by: EMERGENCY MEDICINE

## 2024-03-15 PROCEDURE — 36415 COLL VENOUS BLD VENIPUNCTURE: CPT

## 2024-03-15 PROCEDURE — 99285 EMERGENCY DEPT VISIT HI MDM: CPT

## 2024-03-15 PROCEDURE — 84145 PROCALCITONIN (PCT): CPT

## 2024-03-15 PROCEDURE — 96365 THER/PROPH/DIAG IV INF INIT: CPT

## 2024-03-15 PROCEDURE — 83735 ASSAY OF MAGNESIUM: CPT

## 2024-03-15 PROCEDURE — 87186 SC STD MICRODIL/AGAR DIL: CPT

## 2024-03-15 PROCEDURE — 83605 ASSAY OF LACTIC ACID: CPT

## 2024-03-15 PROCEDURE — 87040 BLOOD CULTURE FOR BACTERIA: CPT

## 2024-03-15 PROCEDURE — 6360000002 HC RX W HCPCS: Performed by: EMERGENCY MEDICINE

## 2024-03-15 PROCEDURE — 87077 CULTURE AEROBIC IDENTIFY: CPT

## 2024-03-15 PROCEDURE — 81001 URINALYSIS AUTO W/SCOPE: CPT

## 2024-03-15 PROCEDURE — 80048 BASIC METABOLIC PNL TOTAL CA: CPT

## 2024-03-15 PROCEDURE — 85027 COMPLETE CBC AUTOMATED: CPT

## 2024-03-15 RX ORDER — POLYETHYLENE GLYCOL 3350 17 G/17G
17 POWDER, FOR SOLUTION ORAL DAILY PRN
Status: DISCONTINUED | OUTPATIENT
Start: 2024-03-15 | End: 2024-03-18

## 2024-03-15 RX ORDER — SODIUM CHLORIDE 0.9 % (FLUSH) 0.9 %
5-40 SYRINGE (ML) INJECTION PRN
Status: DISCONTINUED | OUTPATIENT
Start: 2024-03-15 | End: 2024-03-21 | Stop reason: HOSPADM

## 2024-03-15 RX ORDER — LEVOTHYROXINE SODIUM 0.05 MG/1
50 TABLET ORAL DAILY
Status: DISCONTINUED | OUTPATIENT
Start: 2024-03-16 | End: 2024-03-21 | Stop reason: HOSPADM

## 2024-03-15 RX ORDER — PREGABALIN 75 MG/1
75 CAPSULE ORAL 3 TIMES DAILY
Status: DISCONTINUED | OUTPATIENT
Start: 2024-03-15 | End: 2024-03-21 | Stop reason: HOSPADM

## 2024-03-15 RX ORDER — SODIUM CHLORIDE 9 MG/ML
INJECTION, SOLUTION INTRAVENOUS PRN
Status: DISCONTINUED | OUTPATIENT
Start: 2024-03-15 | End: 2024-03-21 | Stop reason: HOSPADM

## 2024-03-15 RX ORDER — ACETAMINOPHEN 325 MG/1
650 TABLET ORAL EVERY 6 HOURS PRN
Status: DISCONTINUED | OUTPATIENT
Start: 2024-03-15 | End: 2024-03-18

## 2024-03-15 RX ORDER — LANOLIN ALCOHOL/MO/W.PET/CERES
3 CREAM (GRAM) TOPICAL NIGHTLY PRN
Status: DISCONTINUED | OUTPATIENT
Start: 2024-03-15 | End: 2024-03-21 | Stop reason: HOSPADM

## 2024-03-15 RX ORDER — ATORVASTATIN CALCIUM 10 MG/1
10 TABLET, FILM COATED ORAL DAILY
Status: DISCONTINUED | OUTPATIENT
Start: 2024-03-16 | End: 2024-03-21 | Stop reason: HOSPADM

## 2024-03-15 RX ORDER — ENOXAPARIN SODIUM 100 MG/ML
40 INJECTION SUBCUTANEOUS DAILY
Status: DISCONTINUED | OUTPATIENT
Start: 2024-03-16 | End: 2024-03-20

## 2024-03-15 RX ORDER — SODIUM CHLORIDE 0.9 % (FLUSH) 0.9 %
5-40 SYRINGE (ML) INJECTION EVERY 12 HOURS SCHEDULED
Status: DISCONTINUED | OUTPATIENT
Start: 2024-03-15 | End: 2024-03-21 | Stop reason: HOSPADM

## 2024-03-15 RX ORDER — ALPRAZOLAM 0.25 MG/1
0.25 TABLET ORAL DAILY
COMMUNITY

## 2024-03-15 RX ORDER — POLYETHYLENE GLYCOL 3350 17 G/17G
17 POWDER, FOR SOLUTION ORAL DAILY PRN
Status: DISCONTINUED | OUTPATIENT
Start: 2024-03-15 | End: 2024-03-15

## 2024-03-15 RX ORDER — ONDANSETRON 4 MG/1
4 TABLET, ORALLY DISINTEGRATING ORAL EVERY 8 HOURS PRN
Status: DISCONTINUED | OUTPATIENT
Start: 2024-03-15 | End: 2024-03-21 | Stop reason: HOSPADM

## 2024-03-15 RX ORDER — ONDANSETRON 2 MG/ML
4 INJECTION INTRAMUSCULAR; INTRAVENOUS EVERY 6 HOURS PRN
Status: DISCONTINUED | OUTPATIENT
Start: 2024-03-15 | End: 2024-03-21 | Stop reason: HOSPADM

## 2024-03-15 RX ORDER — ALPRAZOLAM 0.25 MG/1
0.25 TABLET ORAL DAILY
Status: DISCONTINUED | OUTPATIENT
Start: 2024-03-16 | End: 2024-03-21 | Stop reason: HOSPADM

## 2024-03-15 RX ORDER — HYDROXYZINE HYDROCHLORIDE 25 MG/1
25 TABLET, FILM COATED ORAL 3 TIMES DAILY PRN
Status: DISCONTINUED | OUTPATIENT
Start: 2024-03-15 | End: 2024-03-21 | Stop reason: HOSPADM

## 2024-03-15 RX ORDER — CALCITONIN SALMON 200 [IU]/.09ML
1 SPRAY, METERED NASAL DAILY
Status: DISCONTINUED | OUTPATIENT
Start: 2024-03-16 | End: 2024-03-21 | Stop reason: HOSPADM

## 2024-03-15 RX ORDER — SODIUM CHLORIDE, SODIUM LACTATE, POTASSIUM CHLORIDE, CALCIUM CHLORIDE 600; 310; 30; 20 MG/100ML; MG/100ML; MG/100ML; MG/100ML
INJECTION, SOLUTION INTRAVENOUS CONTINUOUS
Status: ACTIVE | OUTPATIENT
Start: 2024-03-15 | End: 2024-03-17

## 2024-03-15 RX ORDER — ACETAMINOPHEN 650 MG/1
650 SUPPOSITORY RECTAL EVERY 6 HOURS PRN
Status: DISCONTINUED | OUTPATIENT
Start: 2024-03-15 | End: 2024-03-21 | Stop reason: HOSPADM

## 2024-03-15 RX ORDER — ACETAMINOPHEN 325 MG/1
650 TABLET ORAL EVERY 4 HOURS PRN
Status: DISCONTINUED | OUTPATIENT
Start: 2024-03-15 | End: 2024-03-21 | Stop reason: HOSPADM

## 2024-03-15 RX ADMIN — SODIUM CHLORIDE 1000 MG: 900 INJECTION INTRAVENOUS at 21:27

## 2024-03-15 ASSESSMENT — LIFESTYLE VARIABLES
HOW MANY STANDARD DRINKS CONTAINING ALCOHOL DO YOU HAVE ON A TYPICAL DAY: PATIENT DOES NOT DRINK
HOW OFTEN DO YOU HAVE A DRINK CONTAINING ALCOHOL: NEVER

## 2024-03-15 ASSESSMENT — PAIN - FUNCTIONAL ASSESSMENT: PAIN_FUNCTIONAL_ASSESSMENT: NONE - DENIES PAIN

## 2024-03-15 ASSESSMENT — PAIN SCALES - GENERAL: PAINLEVEL_OUTOF10: 0

## 2024-03-15 NOTE — ED TRIAGE NOTES
Pt presents to ED via EMS from home after being discharged from rehab yesterday and since returning home is having difficulty being taken care of per EMS. EMS reported they were called for a fall and lift assist, pt denies any injuries from the fall, but pt and  wanted her to come to the hospital because they aren't able to care for her. Pt is confused at baseline due to dementia. Alert and oriented to person, time.

## 2024-03-16 LAB
ANION GAP SERPL CALC-SCNC: 3 MMOL/L (ref 5–15)
BASOPHILS # BLD: 0.1 K/UL (ref 0–0.1)
BASOPHILS NFR BLD: 1 % (ref 0–1)
BUN SERPL-MCNC: 22 MG/DL (ref 6–20)
BUN/CREAT SERPL: 22 (ref 12–20)
CALCIUM SERPL-MCNC: 8.9 MG/DL (ref 8.5–10.1)
CHLORIDE SERPL-SCNC: 111 MMOL/L (ref 97–108)
CO2 SERPL-SCNC: 28 MMOL/L (ref 21–32)
CREAT SERPL-MCNC: 0.98 MG/DL (ref 0.55–1.02)
DIFFERENTIAL METHOD BLD: ABNORMAL
EOSINOPHIL # BLD: 0 K/UL (ref 0–0.4)
EOSINOPHIL NFR BLD: 0 % (ref 0–7)
ERYTHROCYTE [DISTWIDTH] IN BLOOD BY AUTOMATED COUNT: 14.2 % (ref 11.5–14.5)
GLUCOSE BLD STRIP.AUTO-MCNC: 109 MG/DL (ref 65–117)
GLUCOSE SERPL-MCNC: 102 MG/DL (ref 65–100)
HCT VFR BLD AUTO: 33.8 % (ref 35–47)
HGB BLD-MCNC: 10.5 G/DL (ref 11.5–16)
IMM GRANULOCYTES # BLD AUTO: 0.1 K/UL (ref 0–0.04)
IMM GRANULOCYTES NFR BLD AUTO: 1 % (ref 0–0.5)
LYMPHOCYTES # BLD: 1.2 K/UL (ref 0.8–3.5)
LYMPHOCYTES NFR BLD: 13 % (ref 12–49)
MCH RBC QN AUTO: 28.8 PG (ref 26–34)
MCHC RBC AUTO-ENTMCNC: 31.1 G/DL (ref 30–36.5)
MCV RBC AUTO: 92.6 FL (ref 80–99)
MONOCYTES # BLD: 1.3 K/UL (ref 0–1)
MONOCYTES NFR BLD: 14 % (ref 5–13)
NEUTS SEG # BLD: 6.7 K/UL (ref 1.8–8)
NEUTS SEG NFR BLD: 71 % (ref 32–75)
NRBC # BLD: 0 K/UL (ref 0–0.01)
NRBC BLD-RTO: 0 PER 100 WBC
PLATELET # BLD AUTO: 273 K/UL (ref 150–400)
PMV BLD AUTO: 10 FL (ref 8.9–12.9)
POTASSIUM SERPL-SCNC: 3.8 MMOL/L (ref 3.5–5.1)
RBC # BLD AUTO: 3.65 M/UL (ref 3.8–5.2)
SERVICE CMNT-IMP: NORMAL
SODIUM SERPL-SCNC: 142 MMOL/L (ref 136–145)
WBC # BLD AUTO: 9.4 K/UL (ref 3.6–11)

## 2024-03-16 PROCEDURE — 97161 PT EVAL LOW COMPLEX 20 MIN: CPT | Performed by: PHYSICAL THERAPIST

## 2024-03-16 PROCEDURE — 97166 OT EVAL MOD COMPLEX 45 MIN: CPT | Performed by: OCCUPATIONAL THERAPIST

## 2024-03-16 PROCEDURE — 6370000000 HC RX 637 (ALT 250 FOR IP): Performed by: INTERNAL MEDICINE

## 2024-03-16 PROCEDURE — 80048 BASIC METABOLIC PNL TOTAL CA: CPT

## 2024-03-16 PROCEDURE — 85025 COMPLETE CBC W/AUTO DIFF WBC: CPT

## 2024-03-16 PROCEDURE — 6360000002 HC RX W HCPCS: Performed by: NURSE PRACTITIONER

## 2024-03-16 PROCEDURE — 82962 GLUCOSE BLOOD TEST: CPT

## 2024-03-16 PROCEDURE — 6370000000 HC RX 637 (ALT 250 FOR IP): Performed by: NURSE PRACTITIONER

## 2024-03-16 PROCEDURE — 97530 THERAPEUTIC ACTIVITIES: CPT | Performed by: PHYSICAL THERAPIST

## 2024-03-16 PROCEDURE — 36415 COLL VENOUS BLD VENIPUNCTURE: CPT

## 2024-03-16 PROCEDURE — G0378 HOSPITAL OBSERVATION PER HR: HCPCS

## 2024-03-16 PROCEDURE — 97535 SELF CARE MNGMENT TRAINING: CPT | Performed by: OCCUPATIONAL THERAPIST

## 2024-03-16 PROCEDURE — 2580000003 HC RX 258: Performed by: NURSE PRACTITIONER

## 2024-03-16 RX ORDER — PANTOPRAZOLE SODIUM 40 MG/1
40 TABLET, DELAYED RELEASE ORAL
Status: DISCONTINUED | OUTPATIENT
Start: 2024-03-17 | End: 2024-03-21 | Stop reason: HOSPADM

## 2024-03-16 RX ORDER — TROSPIUM CHLORIDE 20 MG/1
20 TABLET, FILM COATED ORAL NIGHTLY
Status: DISCONTINUED | OUTPATIENT
Start: 2024-03-16 | End: 2024-03-17

## 2024-03-16 RX ADMIN — SODIUM CHLORIDE, PRESERVATIVE FREE 10 ML: 5 INJECTION INTRAVENOUS at 21:39

## 2024-03-16 RX ADMIN — ATORVASTATIN CALCIUM 10 MG: 10 TABLET, FILM COATED ORAL at 09:13

## 2024-03-16 RX ADMIN — PREGABALIN 75 MG: 75 CAPSULE ORAL at 01:00

## 2024-03-16 RX ADMIN — CEFTRIAXONE SODIUM 1000 MG: 1 INJECTION, POWDER, FOR SOLUTION INTRAMUSCULAR; INTRAVENOUS at 21:37

## 2024-03-16 RX ADMIN — SODIUM CHLORIDE, PRESERVATIVE FREE 10 ML: 5 INJECTION INTRAVENOUS at 09:14

## 2024-03-16 RX ADMIN — PREGABALIN 75 MG: 75 CAPSULE ORAL at 13:34

## 2024-03-16 RX ADMIN — PREGABALIN 75 MG: 75 CAPSULE ORAL at 09:13

## 2024-03-16 RX ADMIN — LEVOTHYROXINE SODIUM 50 MCG: 0.09 TABLET ORAL at 05:42

## 2024-03-16 RX ADMIN — SODIUM CHLORIDE, PRESERVATIVE FREE 10 ML: 5 INJECTION INTRAVENOUS at 01:00

## 2024-03-16 RX ADMIN — ENOXAPARIN SODIUM 40 MG: 100 INJECTION SUBCUTANEOUS at 09:13

## 2024-03-16 RX ADMIN — TROSPIUM CHLORIDE 20 MG: 20 TABLET, FILM COATED ORAL at 21:25

## 2024-03-16 RX ADMIN — ALPRAZOLAM 0.25 MG: 0.25 TABLET ORAL at 09:13

## 2024-03-16 RX ADMIN — PREGABALIN 75 MG: 75 CAPSULE ORAL at 21:24

## 2024-03-16 RX ADMIN — SODIUM CHLORIDE, POTASSIUM CHLORIDE, SODIUM LACTATE AND CALCIUM CHLORIDE: 600; 310; 30; 20 INJECTION, SOLUTION INTRAVENOUS at 01:00

## 2024-03-16 RX ADMIN — SODIUM CHLORIDE, POTASSIUM CHLORIDE, SODIUM LACTATE AND CALCIUM CHLORIDE: 600; 310; 30; 20 INJECTION, SOLUTION INTRAVENOUS at 22:27

## 2024-03-16 RX ADMIN — MELATONIN 3 MG: at 01:00

## 2024-03-16 ASSESSMENT — PAIN SCALES - GENERAL
PAINLEVEL_OUTOF10: 0
PAINLEVEL_OUTOF10: 0

## 2024-03-16 NOTE — ED PROVIDER NOTES
reviewed.   Constitutional:       General: She is not in acute distress.     Appearance: Normal appearance. She is not ill-appearing.   Cardiovascular:      Rate and Rhythm: Normal rate and regular rhythm.   Pulmonary:      Effort: Pulmonary effort is normal. No respiratory distress.      Breath sounds: No wheezing or rhonchi.   Abdominal:      General: Abdomen is flat. There is no distension.      Palpations: Abdomen is soft.      Tenderness: There is no abdominal tenderness.   Skin:     General: Skin is warm and dry.   Neurological:      General: No focal deficit present.      Mental Status: She is alert and oriented to person, place, and time.         Diagnostic Study Results   Labs  Recent Results (from the past 24 hour(s))   BMP    Collection Time: 03/15/24  7:07 PM   Result Value Ref Range    Sodium 143 136 - 145 mmol/L    Potassium 3.9 3.5 - 5.1 mmol/L    Chloride 112 (H) 97 - 108 mmol/L    CO2 29 21 - 32 mmol/L    Anion Gap 2 (L) 5 - 15 mmol/L    Glucose 105 (H) 65 - 100 mg/dL    BUN 24 (H) 6 - 20 MG/DL    Creatinine 1.06 (H) 0.55 - 1.02 MG/DL    Bun/Cre Ratio 23 (H) 12 - 20      Est, Glom Filt Rate 53 (L) >60 ml/min/1.73m2    Calcium 9.2 8.5 - 10.1 MG/DL   CBC    Collection Time: 03/15/24  7:07 PM   Result Value Ref Range    WBC 9.1 3.6 - 11.0 K/uL    RBC 3.81 3.80 - 5.20 M/uL    Hemoglobin 11.0 (L) 11.5 - 16.0 g/dL    Hematocrit 35.2 35.0 - 47.0 %    MCV 92.4 80.0 - 99.0 FL    MCH 28.9 26.0 - 34.0 PG    MCHC 31.3 30.0 - 36.5 g/dL    RDW 14.4 11.5 - 14.5 %    Platelets 290 150 - 400 K/uL    MPV 9.7 8.9 - 12.9 FL    Nucleated RBCs 0.0 0  WBC    nRBC 0.00 0.00 - 0.01 K/uL   Urinalysis with Reflex to Culture    Collection Time: 03/15/24  7:45 PM    Specimen: Urine   Result Value Ref Range    Color, UA YELLOW/STRAW      Appearance CLOUDY (A) CLEAR      Specific Gravity, UA 1.016      pH, Urine 5.5 5.0 - 8.0      Protein, UA 30 (A) NEG mg/dL    Glucose, UA Negative NEG mg/dL    Ketones, Urine Negative NEG

## 2024-03-16 NOTE — H&P
Hospitalist Admission Note    NAME:   Eugenia Weathers   : 1940   MRN: [id number]     Date/Time: 3/15/2024 10:11 PM    Patient PCP: Deysi Ozuna APRN - NP    ______________________________________________________________________  Given the patient's current clinical presentation, I have a high level of concern for decompensation if discharged from the emergency department.  Complex decision making was performed, which includes reviewing the patient's available past medical records, laboratory results, and x-ray films.       My assessment of this patient's clinical condition and my plan of care is as follows.    Assessment / Plan:    Acute cystitis w/hematuria, (hematuria not new per past 3 u/a +) asymptomatic, not meeting SIRS criteria, not appearing septic shock, hemodynamically stable on admit, and wbc wnl 9.1  -admit observation, will need snf placement likely, with possible hospice  -rocephin started in er, continued  -bld cx x2, lactic, procalc, mag- pending  -am labs  -telemetry monitoring, consider d/c after 24 hours if no ectopy  -plan as below    Debility, D/c rehab 3/15, remains unable to ambulate, met cancer w/functional decline, spouse recent cva unable to assist at home, drop off attempted s/p snf, but unable to ambulate and was brought to er  -palliative care consult ordered- appreciate expertise  -pt, ot, case mgmt, soc worker consulted- consider placement SNF  -fall precautions, up w/assist  -hospice consult ordered, as d/w  per request to find out more information, he is considering hospice- appreciate assistance in the care of this pt  -oncology dr sheehan consult ordered, courtesy consult per  requested    Mild agustín on ckd, likely 2/2 mild dehydration and acute cystitis  -ivf hydration   -trend kidney fx  -will monitor kidney function and continue home meds for now,     Mild hyperglycemia w/o known h/o DM  -accuchecks achs, would consider ssi/hypoglycemia protocol should

## 2024-03-17 LAB
ANION GAP SERPL CALC-SCNC: 5 MMOL/L (ref 5–15)
BASOPHILS # BLD: 0.1 K/UL (ref 0–0.1)
BASOPHILS NFR BLD: 1 % (ref 0–1)
BUN SERPL-MCNC: 21 MG/DL (ref 6–20)
BUN/CREAT SERPL: 19 (ref 12–20)
CALCIUM SERPL-MCNC: 8.6 MG/DL (ref 8.5–10.1)
CHLORIDE SERPL-SCNC: 107 MMOL/L (ref 97–108)
CO2 SERPL-SCNC: 26 MMOL/L (ref 21–32)
CREAT SERPL-MCNC: 1.08 MG/DL (ref 0.55–1.02)
DIFFERENTIAL METHOD BLD: ABNORMAL
EOSINOPHIL # BLD: 0 K/UL (ref 0–0.4)
EOSINOPHIL NFR BLD: 0 % (ref 0–7)
ERYTHROCYTE [DISTWIDTH] IN BLOOD BY AUTOMATED COUNT: 14.1 % (ref 11.5–14.5)
GLUCOSE BLD STRIP.AUTO-MCNC: 133 MG/DL (ref 65–117)
GLUCOSE SERPL-MCNC: 81 MG/DL (ref 65–100)
HCT VFR BLD AUTO: 30.1 % (ref 35–47)
HGB BLD-MCNC: 9.7 G/DL (ref 11.5–16)
IMM GRANULOCYTES # BLD AUTO: 0.1 K/UL (ref 0–0.04)
IMM GRANULOCYTES NFR BLD AUTO: 1 % (ref 0–0.5)
LYMPHOCYTES # BLD: 1.4 K/UL (ref 0.8–3.5)
LYMPHOCYTES NFR BLD: 20 % (ref 12–49)
MCH RBC QN AUTO: 28.8 PG (ref 26–34)
MCHC RBC AUTO-ENTMCNC: 32.2 G/DL (ref 30–36.5)
MCV RBC AUTO: 89.3 FL (ref 80–99)
MONOCYTES # BLD: 0.9 K/UL (ref 0–1)
MONOCYTES NFR BLD: 13 % (ref 5–13)
NEUTS SEG # BLD: 4.6 K/UL (ref 1.8–8)
NEUTS SEG NFR BLD: 65 % (ref 32–75)
NRBC # BLD: 0.02 K/UL (ref 0–0.01)
NRBC BLD-RTO: 0.3 PER 100 WBC
PLATELET # BLD AUTO: 253 K/UL (ref 150–400)
POTASSIUM SERPL-SCNC: 4.1 MMOL/L (ref 3.5–5.1)
RBC # BLD AUTO: 3.37 M/UL (ref 3.8–5.2)
RBC MORPH BLD: ABNORMAL
SERVICE CMNT-IMP: ABNORMAL
SODIUM SERPL-SCNC: 138 MMOL/L (ref 136–145)
WBC # BLD AUTO: 7.1 K/UL (ref 3.6–11)

## 2024-03-17 PROCEDURE — 82962 GLUCOSE BLOOD TEST: CPT

## 2024-03-17 PROCEDURE — 36415 COLL VENOUS BLD VENIPUNCTURE: CPT

## 2024-03-17 PROCEDURE — 2580000003 HC RX 258: Performed by: NURSE PRACTITIONER

## 2024-03-17 PROCEDURE — 80048 BASIC METABOLIC PNL TOTAL CA: CPT

## 2024-03-17 PROCEDURE — G0378 HOSPITAL OBSERVATION PER HR: HCPCS

## 2024-03-17 PROCEDURE — 6370000000 HC RX 637 (ALT 250 FOR IP): Performed by: NURSE PRACTITIONER

## 2024-03-17 PROCEDURE — 6360000002 HC RX W HCPCS: Performed by: NURSE PRACTITIONER

## 2024-03-17 PROCEDURE — 85025 COMPLETE CBC W/AUTO DIFF WBC: CPT

## 2024-03-17 RX ORDER — TRAMADOL HYDROCHLORIDE 50 MG/1
50 TABLET ORAL EVERY 6 HOURS PRN
Status: DISCONTINUED | OUTPATIENT
Start: 2024-03-17 | End: 2024-03-21 | Stop reason: HOSPADM

## 2024-03-17 RX ADMIN — PREGABALIN 75 MG: 75 CAPSULE ORAL at 13:35

## 2024-03-17 RX ADMIN — PREGABALIN 75 MG: 75 CAPSULE ORAL at 08:34

## 2024-03-17 RX ADMIN — SODIUM CHLORIDE, PRESERVATIVE FREE 10 ML: 5 INJECTION INTRAVENOUS at 08:34

## 2024-03-17 RX ADMIN — ATORVASTATIN CALCIUM 10 MG: 10 TABLET, FILM COATED ORAL at 08:34

## 2024-03-17 RX ADMIN — PANTOPRAZOLE SODIUM 40 MG: 40 TABLET, DELAYED RELEASE ORAL at 07:10

## 2024-03-17 RX ADMIN — PREGABALIN 75 MG: 75 CAPSULE ORAL at 21:48

## 2024-03-17 RX ADMIN — CEFTRIAXONE SODIUM 1000 MG: 1 INJECTION, POWDER, FOR SOLUTION INTRAMUSCULAR; INTRAVENOUS at 21:57

## 2024-03-17 RX ADMIN — SODIUM CHLORIDE, POTASSIUM CHLORIDE, SODIUM LACTATE AND CALCIUM CHLORIDE: 600; 310; 30; 20 INJECTION, SOLUTION INTRAVENOUS at 18:12

## 2024-03-17 RX ADMIN — SODIUM CHLORIDE, PRESERVATIVE FREE 10 ML: 5 INJECTION INTRAVENOUS at 21:58

## 2024-03-17 RX ADMIN — LEVOTHYROXINE SODIUM 50 MCG: 0.09 TABLET ORAL at 07:10

## 2024-03-17 RX ADMIN — SODIUM CHLORIDE, POTASSIUM CHLORIDE, SODIUM LACTATE AND CALCIUM CHLORIDE: 600; 310; 30; 20 INJECTION, SOLUTION INTRAVENOUS at 04:50

## 2024-03-17 RX ADMIN — ALPRAZOLAM 0.25 MG: 0.25 TABLET ORAL at 08:34

## 2024-03-17 RX ADMIN — ENOXAPARIN SODIUM 40 MG: 100 INJECTION SUBCUTANEOUS at 08:34

## 2024-03-17 RX ADMIN — CALCITONIN SALMON 1 SPRAY: 200 SPRAY, METERED NASAL at 08:34

## 2024-03-17 ASSESSMENT — PAIN SCALES - GENERAL
PAINLEVEL_OUTOF10: 0

## 2024-03-18 ENCOUNTER — TELEPHONE (OUTPATIENT)
Facility: CLINIC | Age: 84
End: 2024-03-18

## 2024-03-18 ENCOUNTER — APPOINTMENT (OUTPATIENT)
Facility: HOSPITAL | Age: 84
DRG: 690 | End: 2024-03-18
Payer: MEDICARE

## 2024-03-18 LAB
ANION GAP SERPL CALC-SCNC: 5 MMOL/L (ref 5–15)
BACTERIA SPEC CULT: ABNORMAL
BASOPHILS # BLD: 0 K/UL (ref 0–0.1)
BASOPHILS NFR BLD: 0 % (ref 0–1)
BUN SERPL-MCNC: 21 MG/DL (ref 6–20)
BUN/CREAT SERPL: 19 (ref 12–20)
CALCIUM SERPL-MCNC: 9 MG/DL (ref 8.5–10.1)
CC UR VC: ABNORMAL
CHLORIDE SERPL-SCNC: 106 MMOL/L (ref 97–108)
CO2 SERPL-SCNC: 27 MMOL/L (ref 21–32)
CREAT SERPL-MCNC: 1.09 MG/DL (ref 0.55–1.02)
DIFFERENTIAL METHOD BLD: ABNORMAL
EOSINOPHIL # BLD: 0 K/UL (ref 0–0.4)
EOSINOPHIL NFR BLD: 0 % (ref 0–7)
ERYTHROCYTE [DISTWIDTH] IN BLOOD BY AUTOMATED COUNT: 14 % (ref 11.5–14.5)
GLUCOSE BLD STRIP.AUTO-MCNC: 90 MG/DL (ref 65–117)
GLUCOSE BLD STRIP.AUTO-MCNC: 94 MG/DL (ref 65–117)
GLUCOSE BLD STRIP.AUTO-MCNC: 99 MG/DL (ref 65–117)
GLUCOSE SERPL-MCNC: 90 MG/DL (ref 65–100)
HCT VFR BLD AUTO: 33.1 % (ref 35–47)
HGB BLD-MCNC: 10.4 G/DL (ref 11.5–16)
IMM GRANULOCYTES # BLD AUTO: 0.1 K/UL (ref 0–0.04)
IMM GRANULOCYTES NFR BLD AUTO: 1 % (ref 0–0.5)
LYMPHOCYTES # BLD: 1.3 K/UL (ref 0.8–3.5)
LYMPHOCYTES NFR BLD: 14 % (ref 12–49)
MCH RBC QN AUTO: 28 PG (ref 26–34)
MCHC RBC AUTO-ENTMCNC: 31.4 G/DL (ref 30–36.5)
MCV RBC AUTO: 89 FL (ref 80–99)
MONOCYTES # BLD: 1.2 K/UL (ref 0–1)
MONOCYTES NFR BLD: 13 % (ref 5–13)
NEUTS SEG # BLD: 6.6 K/UL (ref 1.8–8)
NEUTS SEG NFR BLD: 72 % (ref 32–75)
NRBC # BLD: 0 K/UL (ref 0–0.01)
NRBC BLD-RTO: 0 PER 100 WBC
PLATELET # BLD AUTO: 274 K/UL (ref 150–400)
PMV BLD AUTO: 9.9 FL (ref 8.9–12.9)
POTASSIUM SERPL-SCNC: 4.1 MMOL/L (ref 3.5–5.1)
RBC # BLD AUTO: 3.72 M/UL (ref 3.8–5.2)
SERVICE CMNT-IMP: ABNORMAL
SERVICE CMNT-IMP: NORMAL
SODIUM SERPL-SCNC: 138 MMOL/L (ref 136–145)
WBC # BLD AUTO: 9.2 K/UL (ref 3.6–11)

## 2024-03-18 PROCEDURE — 6370000000 HC RX 637 (ALT 250 FOR IP): Performed by: NURSE PRACTITIONER

## 2024-03-18 PROCEDURE — 36415 COLL VENOUS BLD VENIPUNCTURE: CPT

## 2024-03-18 PROCEDURE — G0378 HOSPITAL OBSERVATION PER HR: HCPCS

## 2024-03-18 PROCEDURE — 6360000002 HC RX W HCPCS: Performed by: NURSE PRACTITIONER

## 2024-03-18 PROCEDURE — 76770 US EXAM ABDO BACK WALL COMP: CPT

## 2024-03-18 PROCEDURE — 82962 GLUCOSE BLOOD TEST: CPT

## 2024-03-18 PROCEDURE — 85025 COMPLETE CBC W/AUTO DIFF WBC: CPT

## 2024-03-18 PROCEDURE — 51702 INSERT TEMP BLADDER CATH: CPT

## 2024-03-18 PROCEDURE — 2580000003 HC RX 258: Performed by: NURSE PRACTITIONER

## 2024-03-18 PROCEDURE — 80048 BASIC METABOLIC PNL TOTAL CA: CPT

## 2024-03-18 PROCEDURE — 51798 US URINE CAPACITY MEASURE: CPT

## 2024-03-18 PROCEDURE — 1100000000 HC RM PRIVATE

## 2024-03-18 PROCEDURE — 51701 INSERT BLADDER CATHETER: CPT

## 2024-03-18 RX ORDER — CASTOR OIL AND BALSAM, PERU 788; 87 MG/G; MG/G
OINTMENT TOPICAL 2 TIMES DAILY
Status: DISCONTINUED | OUTPATIENT
Start: 2024-03-18 | End: 2024-03-21 | Stop reason: HOSPADM

## 2024-03-18 RX ORDER — SENNA AND DOCUSATE SODIUM 50; 8.6 MG/1; MG/1
2 TABLET, FILM COATED ORAL DAILY
Status: DISCONTINUED | OUTPATIENT
Start: 2024-03-18 | End: 2024-03-19

## 2024-03-18 RX ORDER — POLYETHYLENE GLYCOL 3350 17 G/17G
17 POWDER, FOR SOLUTION ORAL 2 TIMES DAILY
Status: DISCONTINUED | OUTPATIENT
Start: 2024-03-18 | End: 2024-03-21

## 2024-03-18 RX ADMIN — Medication: at 09:14

## 2024-03-18 RX ADMIN — LEVOTHYROXINE SODIUM 50 MCG: 0.09 TABLET ORAL at 06:27

## 2024-03-18 RX ADMIN — POLYETHYLENE GLYCOL 3350 17 G: 17 POWDER, FOR SOLUTION ORAL at 20:55

## 2024-03-18 RX ADMIN — PREGABALIN 75 MG: 75 CAPSULE ORAL at 08:34

## 2024-03-18 RX ADMIN — POLYETHYLENE GLYCOL 3350 17 G: 17 POWDER, FOR SOLUTION ORAL at 08:35

## 2024-03-18 RX ADMIN — ENOXAPARIN SODIUM 40 MG: 100 INJECTION SUBCUTANEOUS at 08:35

## 2024-03-18 RX ADMIN — PANTOPRAZOLE SODIUM 40 MG: 40 TABLET, DELAYED RELEASE ORAL at 06:27

## 2024-03-18 RX ADMIN — PREGABALIN 75 MG: 75 CAPSULE ORAL at 14:05

## 2024-03-18 RX ADMIN — SODIUM CHLORIDE, PRESERVATIVE FREE 5 ML: 5 INJECTION INTRAVENOUS at 20:55

## 2024-03-18 RX ADMIN — DOCUSATE SODIUM AND SENNOSIDES 2 TABLET: 8.6; 5 TABLET, FILM COATED ORAL at 08:34

## 2024-03-18 RX ADMIN — CALCITONIN SALMON 1 SPRAY: 200 SPRAY, METERED NASAL at 08:36

## 2024-03-18 RX ADMIN — CEFTRIAXONE SODIUM 1000 MG: 1 INJECTION, POWDER, FOR SOLUTION INTRAMUSCULAR; INTRAVENOUS at 08:45

## 2024-03-18 RX ADMIN — Medication: at 20:55

## 2024-03-18 RX ADMIN — ALPRAZOLAM 0.25 MG: 0.25 TABLET ORAL at 08:34

## 2024-03-18 RX ADMIN — ATORVASTATIN CALCIUM 10 MG: 10 TABLET, FILM COATED ORAL at 08:34

## 2024-03-18 RX ADMIN — SODIUM CHLORIDE, PRESERVATIVE FREE 10 ML: 5 INJECTION INTRAVENOUS at 08:36

## 2024-03-18 RX ADMIN — PREGABALIN 75 MG: 75 CAPSULE ORAL at 20:55

## 2024-03-18 NOTE — TELEPHONE ENCOUNTER
Problem with Legs - Nmiesh left a VM on Friday, 3/15/24, 4:22pm.  He stated in the VM that the patient is home from rehab but can't use her legs.  He said the EMS was on the way, the patient is on the floor and unable to get up.  He said that the patient fights him whenever he tries to help her.  He said that he can't care for her and is seeking alternative.    Nimesh' callback is [phone]

## 2024-03-18 NOTE — TELEPHONE ENCOUNTER
Hospice - Nimesh Core called to update Deysi Ozuna NP that the patient will be coming home from the hospital this week and will be admitted to [facility].    Nimesh' callback if needed is [phone]

## 2024-03-18 NOTE — CARE COORDINATION
KENDALL informed that agency: [facility], isnt able to service pt at this time, due to pt needing additional support in the home.  CM will send referral to additional hospice agency.    KENDALL will continue to follow.    YAZMIN Garcia CM  [phone]

## 2024-03-18 NOTE — CONSULTS
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 Admitted after falling at home, went home from rehab and was home 3 hrs before falling.  - discussed with patient and  her ECOG is too poor for chemotherapy and I recommend hospice. They are agreeable and ok with me getting them back in contact with them.  - talked with KENDALL Sanchez who will ask hospice to help pt get things squared away at home.    Than you for this kind referral, we will follow along with you.    Discussion: Eugenia Weathers is a  83 y.o. year old seen in consultation at the request of Dr. Stoddard for hx of breast cancer.  She follows with Dr Del Rio and hasn't had any therapy due to poor ECOG, plan at last visit on 2/26 was to go to rehab to see if her strength could be improved enough to receive palliative chemotherapy. Unfortunately,  She went home and was home a few hours when she tried to go to restroom and she fell with  trying to help her.  She is feeling more fatigued and frail, no appetite. NO pain reported today        Imaging:    -Imaging-   CT Result (most recent):  CT ABDOMEN PELVIS WO IV CONTRAST 01/14/2024    Narrative  EXAM: CT ABDOMEN PELVIS WO CONTRAST    INDICATION: cancer staging    COMPARISON: Chest CT 1/13/2024    IV CONTRAST: None.    ORAL CONTRAST: Oral contrast was administered to better evaluate the bowel.    TECHNIQUE:  Thin axial images were obtained through the abdomen and pelvis. Coronal and  sagittal reformats were generated. CT dose reduction was achieved through use of  a standardized protocol tailored

## 2024-03-18 NOTE — CARE COORDINATION
CM acknowledged consult.  CM sent referral to  Hospice and was informed that agency unable to service in pts zip code.  CM spoke with pts spouse, and he reported that he had spoke with hospice agency: [facility].   Pts spouse reported that he would like referral to be sent to agency to assist with pts care.  Pts spouse reported that he will need time to clean out guess bedroom before DME to be delivered to their home.  Pts spouse reported it could take him at least a day or two to do so.      CM sent referral to [facility] and spoke with Alondra (liaison).  She confirmed that pts spouse contacted agency and spoke with coordinator.  Alondra will continue to review pts clinicals that were sent.    KENDALL will continue to follow.    YAZMIN Garcia CM  [phone]     03/18/24 9551   Service Assessment   Patient Orientation Other (see comment)   Cognition Other (see comment)   History Provided By Spouse   Primary Caregiver Spouse   Support Systems Spouse/Significant Other   PCP Verified by CM Yes   Last Visit to PCP Within last 3 months   Prior Functional Level Assistance with the following:;Bathing;Dressing;Toileting;Mobility;Shopping;Housework;Cooking;Feeding   Current Functional Level Assistance with the following:;Feeding;Cooking;Housework;Shopping;Mobility;Bathing;Dressing;Toileting   Can patient return to prior living arrangement Yes   Ability to make needs known: Unable   Family able to assist with home care needs: Yes   Would you like for me to discuss the discharge plan with any other family members/significant others, and if so, who? Yes   Social/Functional History   Lives With Spouse   Type of Home House   Active  No   Mode of Transportation Other  (BLS transport)   Discharge Planning   Living Arrangements Spouse/Significant Other        [facility]  PODIATRIC MEDICINE AND SURGERY      CHIEF COMPLAINT   Chief Complaint   Patient presents with    Nail Problem     bilateral nail discoloration and thickness.         HPI:    Yessenia Glover is a 54 y.o. female presenting to podiatry clinic with complaint of fungal infection on toenails. The patient has tried over the counter medications with some success, but the problem is recurrent and aggravating. Patient inquires about available treatment options. No further pedal complaints.      PMH  Past Medical History:   Diagnosis Date    Hyperlipidemia     Hypertension        PROBLEM LIST  Patient Active Problem List    Diagnosis Date Noted    Obesity (BMI 30.0-34.9) 02/26/2019       MEDS  Current Outpatient Medications on File Prior to Visit   Medication Sig Dispense Refill    Ca-D3-mag-zinc--leti-boron (CALTRATE 600-D PLUS MINERALS) 600 mg calcium- 800 unit-40 mg Chew       fish oil-omega-3 fatty acids 300-1,000 mg capsule Take by mouth once daily.      ketoconazole (NIZORAL) 2 % cream AAA bid 60 g 3    MULTIVIT-IRON-MIN-FOLIC ACID 3,500-18-0.4 UNIT-MG-MG ORAL CHEW Take by mouth once daily.      miconazole NITRATE 2 % (ZEASORB AF) 2 % top powder Use two to three times daily to prevent rash 85 g 11    sodium,potassium,mag sulfates (SUPREP BOWEL PREP KIT) 17.5-3.13-1.6 gram SolR Take as directed. 354 mL 0    triamcinolone acetonide 0.025% (KENALOG) 0.025 % cream Apply topically 2 (two) times daily as needed. Mild steroid. 80 g 1     No current facility-administered medications on file prior to visit.        PSH     Past Surgical History:   Procedure Laterality Date    AXILLARY SURGERY      Right    THYROIDECTOMY, PARTIAL      TUBAL LIGATION          ALL  Review of patient's allergies indicates:   Allergen Reactions    Codeine Itching    Sulfa (sulfonamide antibiotics) Nausea And Vomiting       SOC     Social History     Tobacco Use    Smoking status: Never Smoker     "Smokeless tobacco: Never Used   Substance Use Topics    Alcohol use: No     Frequency: Never    Drug use: No         Family HX    Family History   Problem Relation Age of Onset    Hypertension Mother     Hypertension Father             REVIEW OF SYSTEMS  General: Denies any fever or chills  Chest: Denies shortness of breath, wheezing, coughing, or sputum production  Heart: Denies chest pain, cold extremities, orthopenia, or reduced exercise tolerance  As noted above and per history of current illness above, otherwise negative in the remainder of the 14 systems.      PHYSICAL EXAM:      Vitals:    04/05/19 1318   BP: 126/83   Pulse: 64   Weight: 79.9 kg (176 lb 2.4 oz)   Height: 5' 3" (1.6 m)   PainSc: 0-No pain       General: This patient is well-developed, well-nourished and appears stated age, well-oriented to person, place and time, and cooperative and pleasant on today's visit    LOWER EXTREMITY PHYSICAL EXAM  VASCULAR  Dorsalis pedis and posterior tibial pulses palpable 2/4 bilaterally.   Capillary refill time immediate to the toes.   Feet are warm to the touch. Skin temperature warm to warm from proximally to distally   There are no varicosities, telangiectasias noted to bilateral foot and ankle regions.   There are no ecchymoses noted to bilateral foot and ankle regions.   There is no gross lower extremity edema.    DERMATOLOGIC  There are thickened discolored, elongated mycotic nails suggestive of onychomycosis    Skin moist with healthy texture and turgor.  There are no open ulcerations, lacerations, or fissures to bilateral foot and ankle regions. There are no signs of infection as there is no erythema, no proximal-extending lymphangiitis, no fluctuance, or crepitus noted on palpation to bilateral foot and ankle regions.   There is  interdigital maceration with scales 3,4 b/l   There are no hyperkeratotic lesions noted to feet.     NEUROLOGIC  Epicritic sensation is intact as the patient is able to " sense light touch to bilateral foot and ankle regions.   Achilles and patellar deep tendon reflexes intact  Babinski reflex absent    ORTHOPEDIC/BIOMECHANICAL  No symptomatic structural abnormalities noted  Muscle strength AT/EHL/EDL/PT: 5/5; Achilles/Gastroc/Soleus: 5/5; PB/PL: 5/5 Muscle tone is normal.  Ankle joint ROM INTACT DF/PF, non-crepitus      ASSESSMENT   Dermatophytosis of nail  -     Fungal culture , skin, hair, or nails  -     Hepatic function panel; Future; Expected date: 04/05/2019  -     Hepatic function panel; Future; Expected date: 04/05/2019    Tinea pedis of both feet    Other orders  -     ciclopirox (PENLAC) 8 % Soln; Apply to affected toenails at night time DAILY. On 7th day, file nails down, clean all nails with alcohol and restart application process.  Dispense: 1 Bottle; Refill: 10        PLAN    1. Patient was educated about clinical and imaging findings, and verbalizes understanding of above.  2. I Discussed treatment options for nail fungus.   -I explained that fungus lives in a warm dark moist environment and therefore patient should make every attempt to keep feet clean and dry.  We discussed drying feet thoroughly after shower particularly between the toes and then applying powder between the toes and in the shoes. I also discussed to disinfect shower tub, discard old shoes, and disinfect current shoes with antiseptic  -For fungal toenails I prescribed PENLAC nail lacquer to be used daily for up to a year.  I have provided the patient written instructions on topical solution application   I also discussed oral Lamisil but I did not recommend it as a first line of treatment since it is an internal medicine that may potentially have side effects, including liver problems.   -Patient elects for combination  treatment.  -Fungal nail culture taken for microbiological identification of nail sample.   -We also discussed final resolution of nail dystrophy via total nail removal either  temporary or permanent. Patient was informed that nail fungus might return even after nail removal, however increased prognosis in resolution of nail fungus with combination therapy.    For the athletes foot, patient was prescribed Ketoconazole to apply between the toes and to the bottoms of feet twice a day. In addition, recommendations were made to spray and disinfect shoes with Lysol and to alternate shoes. Also, if excessive sweating patient was instructed to use Arid Extra Dry spray on the bottom of the feet with Zeasorb powder in the digital interspaces.    3. RTC  for follow up/evaluation as scheduled      Report Electronically Signed By:     Mana Florentino DPM   Podiatry  [facility]  4/5/2019

## 2024-03-18 NOTE — CONSULTS
83 y.o.  female with PMHx significant for Stress incontinence, chronic pain, DDD, metastatic breast cancer about to undergo treatment with onc Dr Del Rio, OAB, ckd, hypothyroidism, h/o rheumatic fever, essential tremors hand/fingers, dementia/mood disorder/anxiety, chronic pruritus, osteoporosis, parathyroidism.  Admitted to hospital due to debility, persistent ambulatory dysfunction following rehab stay.  Urology consulted for evaluation of urinary retention, 1 bladder scan of 614. Of note patient is known to [facility] through 2 previous inpatient consults for urinary retention. Patient was referred to [facility] in 2004 for urodynamics studies to evaluate for neurogenic bladder, study was significant for stress incontinence.    Chart reviewed     VSS, afebrile  No leukocytosis, creatinine- 1.09 ( baseline appears to be 1.01)  UA- c/w UTI, ucx + e.coli, 100,000 colonies  Renal US pending    Urinary Retention  UTI  Debility  Bladder scan of 614, requiring straight cath. Stress incontinence on Gemtesa, elevated PVR after Gemtesa resumed on 3/17/24   - Hold Gemtesa  - Bladder scan in 6 hours of PVR after next void, value is greater than 300 place FC. Reasonable to VT in 3 days. If patient discharges prior to VT maintain FC, VT can be completed while in rehab. If patient fails VT, please call to schedule OP VT with [facility]. Ideally VT should be completed once patient is closer to baseline mobility.  - Hospice consult pending, if patient discharges with hospice it is reasonable to DC with FC and continue management per hospice.  - Continue culture driven ABX per primary team     Urology will sign-off please call with questions.

## 2024-03-18 NOTE — PLAN OF CARE
Problem: Pain  Goal: Verbalizes/displays adequate comfort level or baseline comfort level  Outcome: Progressing     Problem: Safety - Adult  Goal: Free from fall injury  Outcome: Progressing     Problem: Chronic Conditions and Co-morbidities  Goal: Patient's chronic conditions and co-morbidity symptoms are monitored and maintained or improved  Outcome: Progressing     Problem: Skin/Tissue Integrity  Goal: Absence of new skin breakdown  Description: 1.  Monitor for areas of redness and/or skin breakdown  2.  Assess vascular access sites hourly  3.  Every 4-6 hours minimum:  Change oxygen saturation probe site  4.  Every 4-6 hours:  If on nasal continuous positive airway pressure, respiratory therapy assess nares and determine need for appliance change or resting period.  Outcome: Progressing     
  Problem: Pain  Goal: Verbalizes/displays adequate comfort level or baseline comfort level  Outcome: Progressing     Problem: Safety - Adult  Goal: Free from fall injury  Outcome: Progressing     Problem: Chronic Conditions and Co-morbidities  Goal: Patient's chronic conditions and co-morbidity symptoms are monitored and maintained or improved  Outcome: Progressing     Problem: Skin/Tissue Integrity  Goal: Absence of new skin breakdown  Description: 1.  Monitor for areas of redness and/or skin breakdown  2.  Assess vascular access sites hourly  3.  Every 4-6 hours minimum:  Change oxygen saturation probe site  4.  Every 4-6 hours:  If on nasal continuous positive airway pressure, respiratory therapy assess nares and determine need for appliance change or resting period.  Outcome: Progressing     Problem: Occupational Therapy - Adult  Goal: By Discharge: Performs self-care activities at highest level of function for planned discharge setting.  See evaluation for individualized goals.  Description: FUNCTIONAL STATUS PRIOR TO ADMISSION:     , ADL Assistance: Needs assistance (reports increased A needed lately due to tremors.),  ,  ,  ,  ,  ,  , Ambulation Assistance: Needs assistance, Transfer Assistance: Needs assistance, Active : No     HOME SUPPORT: pt lives with  but is unsure is she will return home with him due to husbands recent CVA and decreased \"compatibility\"  Pt was a [facility] and returned home but unable to get to door, brought to ED.      Occupational Therapy Goals:  Initiated 3/16/2024  1.  Patient will perform grooming with setup within 7 day(s).  2.  Patient will perform upper body dressing with Set-up within 7 day(s).  3.  Patient will perform lower body dressing at bed level with Contact Guard Assist within 7 day(s).  4.  Patient will perform toilet transfers with Moderate Assist and most appropriate transfer technique within 7 day(s).  5.  Patient will perform ADLS at edge of bed 
  Problem: Physical Therapy - Adult  Goal: By Discharge: Performs mobility at highest level of function for planned discharge setting.  See evaluation for individualized goals.  Outcome: Progressing     Problem: Physical Therapy - Adult  Goal: By Discharge: Performs mobility at highest level of function for planned discharge setting.  See evaluation for individualized goals.  Description: FUNCTIONAL STATUS PRIOR TO ADMISSION: Patient was discharged from SNF on 3/15. Per chart she was not able to ambulate into home so she returned to hospital    HOME SUPPORT PRIOR TO ADMISSION: Patient has been at SNF and reports inability to ambulate secondary to increased tremors. At baseline lives with  who had a CVA last year.    Physical Therapy Goals  Initiated 3/16/2024  1.  Patient will move from supine to sit and sit to supine , scoot up and down, and roll side to side in bed with supervision/set-up within 7 day(s).    2.  Patient will transfer from bed to chair and chair to bed with moderate assistance  via lateral or squat pivot transfer using the least restrictive device within 7 day(s).    3/16/2024 1419 by Chica Walker, PT     PHYSICAL THERAPY EVALUATION    Patient: Eugenia Weathers (83 y.o. female)  Date: 3/16/2024  Primary Diagnosis: Debility [R53.81]  Acute cystitis with hematuria [N30.01]  Acute UTI (urinary tract infection) [N39.0]  Metastasis from breast cancer (HCC) [C79.9, C50.919]       Precautions: Restrictions/Precautions: Fall Risk                      ASSESSMENT :   Patient seen for PT evaluation following admission for acute cystitis. Patient has been at SNF rehab and was not able to ambulate from transport van into home therefore returned to ED. Patient with h/o metastatic breat CA and per chart  is interested in hospice.   Patient with intermittent confusion but able to follow commands. She expresses concern regarding her relationship with her  and tearful at times throughout tx 
Barthel activities of daily living index: self-reporting versus actual performance in the old (> or = 75 years). Journal of American Geriatric Society 45(7), 832-836.   -CRICKET Olson, LOUIE Gao., OZ Chance., CARLA Presley. (1999). Measuring the change in disability after inpatient rehabilitation; comparison of the responsiveness of the Barthel Index and Functional [place] Measure. Journal of Neurology, Neurosurgery, and Psychiatry, 66(4), 480-484.  -DIAN Lindo, TOBY Lowe, & MISAEL Gann (2004) Assessment of post-stroke quality of life in cost-effectiveness studies: The usefulness of the Barthel Index and the EuroQoL-5D. Quality of Life Research, 13, 427-43                                                                                                                                                                                                                                 Pain Ratin/10   Pain Intervention(s):       Activity Tolerance:   Fair     After treatment:   Patient left in no apparent distress in bed, Call bell within reach, Bed/ chair alarm activated, and RN made aware    COMMUNICATION/EDUCATION:   The patient's plan of care was discussed with: physical therapist, registered nurse, and     Patient Education  Education Given To: Patient  Education Provided: Role of Therapy;Plan of Care;ADL Adaptive Strategies  Education Method: Demonstration;Verbal  Barriers to Learning: None  Education Outcome: Verbalized understanding;Continued education needed    Thank you for this referral.  Sun Ewing OTR/L  Minutes: 36    Occupational Therapy Evaluation Charge Determination   History Examination Decision-Making   MEDIUM Complexity : Expanded review of history including physical, cognitive and psychocial history  MEDIUM Complexity: 3-5 Performance deficits relating to physical, cognitive, or psychosocial skills that result in activity limitations

## 2024-03-18 NOTE — TELEPHONE ENCOUNTER
Per note by [facility] liaison, pt and spouse have chosen for pt to discharge home with hospice.   Hospice is at capacity in pt's zip code so pt will be referred to another hospice agency.

## 2024-03-18 NOTE — CONSULTS
Palliative Medicine  Patient Name: Eugenia Weathres  YOB: 1940  MRN: [id number]  Age: 83 y.o.  Gender: female    Received consult and reviewed the chart  Ms Weathers and her  want to d/c with hospice services  No need for Palliative to see at this time    Canceling this consult    KANCHAN Luciano - NP

## 2024-03-19 ENCOUNTER — APPOINTMENT (OUTPATIENT)
Facility: HOSPITAL | Age: 84
DRG: 690 | End: 2024-03-19
Payer: MEDICARE

## 2024-03-19 LAB
ANION GAP SERPL CALC-SCNC: 2 MMOL/L (ref 5–15)
BASOPHILS # BLD: 0 K/UL (ref 0–0.1)
BASOPHILS NFR BLD: 1 % (ref 0–1)
BUN SERPL-MCNC: 21 MG/DL (ref 6–20)
BUN/CREAT SERPL: 16 (ref 12–20)
CALCIUM SERPL-MCNC: 8.9 MG/DL (ref 8.5–10.1)
CHLORIDE SERPL-SCNC: 108 MMOL/L (ref 97–108)
CO2 SERPL-SCNC: 27 MMOL/L (ref 21–32)
CREAT SERPL-MCNC: 1.31 MG/DL (ref 0.55–1.02)
DIFFERENTIAL METHOD BLD: ABNORMAL
EOSINOPHIL # BLD: 0 K/UL (ref 0–0.4)
EOSINOPHIL NFR BLD: 0 % (ref 0–7)
ERYTHROCYTE [DISTWIDTH] IN BLOOD BY AUTOMATED COUNT: 14.1 % (ref 11.5–14.5)
GLUCOSE BLD STRIP.AUTO-MCNC: 91 MG/DL (ref 65–117)
GLUCOSE BLD STRIP.AUTO-MCNC: 93 MG/DL (ref 65–117)
GLUCOSE BLD STRIP.AUTO-MCNC: 99 MG/DL (ref 65–117)
GLUCOSE BLD STRIP.AUTO-MCNC: 99 MG/DL (ref 65–117)
GLUCOSE SERPL-MCNC: 87 MG/DL (ref 65–100)
HCT VFR BLD AUTO: 30.3 % (ref 35–47)
HGB BLD-MCNC: 9.8 G/DL (ref 11.5–16)
IMM GRANULOCYTES # BLD AUTO: 0.1 K/UL (ref 0–0.04)
IMM GRANULOCYTES NFR BLD AUTO: 1 % (ref 0–0.5)
LYMPHOCYTES # BLD: 1.3 K/UL (ref 0.8–3.5)
LYMPHOCYTES NFR BLD: 15 % (ref 12–49)
MCH RBC QN AUTO: 28.4 PG (ref 26–34)
MCHC RBC AUTO-ENTMCNC: 32.3 G/DL (ref 30–36.5)
MCV RBC AUTO: 87.8 FL (ref 80–99)
MONOCYTES # BLD: 1.2 K/UL (ref 0–1)
MONOCYTES NFR BLD: 14 % (ref 5–13)
NEUTS SEG # BLD: 6.1 K/UL (ref 1.8–8)
NEUTS SEG NFR BLD: 69 % (ref 32–75)
NRBC # BLD: 0 K/UL (ref 0–0.01)
NRBC BLD-RTO: 0 PER 100 WBC
PLATELET # BLD AUTO: 271 K/UL (ref 150–400)
PMV BLD AUTO: 9.6 FL (ref 8.9–12.9)
POTASSIUM SERPL-SCNC: 3.9 MMOL/L (ref 3.5–5.1)
RBC # BLD AUTO: 3.45 M/UL (ref 3.8–5.2)
SERVICE CMNT-IMP: NORMAL
SODIUM SERPL-SCNC: 137 MMOL/L (ref 136–145)
WBC # BLD AUTO: 8.7 K/UL (ref 3.6–11)

## 2024-03-19 PROCEDURE — 85025 COMPLETE CBC W/AUTO DIFF WBC: CPT

## 2024-03-19 PROCEDURE — 6370000000 HC RX 637 (ALT 250 FOR IP): Performed by: NURSE PRACTITIONER

## 2024-03-19 PROCEDURE — 6360000002 HC RX W HCPCS: Performed by: NURSE PRACTITIONER

## 2024-03-19 PROCEDURE — 2580000003 HC RX 258: Performed by: NURSE PRACTITIONER

## 2024-03-19 PROCEDURE — 82962 GLUCOSE BLOOD TEST: CPT

## 2024-03-19 PROCEDURE — 1100000000 HC RM PRIVATE

## 2024-03-19 PROCEDURE — 71045 X-RAY EXAM CHEST 1 VIEW: CPT

## 2024-03-19 PROCEDURE — 2700000000 HC OXYGEN THERAPY PER DAY

## 2024-03-19 PROCEDURE — 80048 BASIC METABOLIC PNL TOTAL CA: CPT

## 2024-03-19 PROCEDURE — 36415 COLL VENOUS BLD VENIPUNCTURE: CPT

## 2024-03-19 RX ORDER — IBUPROFEN 200 MG
TABLET ORAL 2 TIMES DAILY
Status: DISCONTINUED | OUTPATIENT
Start: 2024-03-19 | End: 2024-03-20

## 2024-03-19 RX ORDER — IPRATROPIUM BROMIDE AND ALBUTEROL SULFATE 2.5; .5 MG/3ML; MG/3ML
1 SOLUTION RESPIRATORY (INHALATION) EVERY 4 HOURS PRN
Status: DISCONTINUED | OUTPATIENT
Start: 2024-03-19 | End: 2024-03-21 | Stop reason: HOSPADM

## 2024-03-19 RX ORDER — SODIUM CHLORIDE 9 MG/ML
INJECTION, SOLUTION INTRAVENOUS CONTINUOUS
Status: DISCONTINUED | OUTPATIENT
Start: 2024-03-19 | End: 2024-03-19

## 2024-03-19 RX ORDER — SENNA AND DOCUSATE SODIUM 50; 8.6 MG/1; MG/1
2 TABLET, FILM COATED ORAL 2 TIMES DAILY
Status: DISCONTINUED | OUTPATIENT
Start: 2024-03-19 | End: 2024-03-21

## 2024-03-19 RX ORDER — LACTULOSE 10 G/15ML
20 SOLUTION ORAL 2 TIMES DAILY
Status: DISCONTINUED | OUTPATIENT
Start: 2024-03-19 | End: 2024-03-21

## 2024-03-19 RX ADMIN — ALPRAZOLAM 0.25 MG: 0.25 TABLET ORAL at 09:06

## 2024-03-19 RX ADMIN — SODIUM CHLORIDE, PRESERVATIVE FREE 10 ML: 5 INJECTION INTRAVENOUS at 20:32

## 2024-03-19 RX ADMIN — POLYMYXIN B SULFATE, BACITRACIN ZINC, NEOMYCIN SULFATE: 5000; 3.5; 4 OINTMENT TOPICAL at 20:37

## 2024-03-19 RX ADMIN — DOCUSATE SODIUM 50MG AND SENNOSIDES 8.6MG 2 TABLET: 8.6; 5 TABLET, FILM COATED ORAL at 09:06

## 2024-03-19 RX ADMIN — PREGABALIN 75 MG: 75 CAPSULE ORAL at 14:30

## 2024-03-19 RX ADMIN — POLYETHYLENE GLYCOL 3350 17 G: 17 POWDER, FOR SOLUTION ORAL at 09:08

## 2024-03-19 RX ADMIN — PREGABALIN 75 MG: 75 CAPSULE ORAL at 09:28

## 2024-03-19 RX ADMIN — PANTOPRAZOLE SODIUM 40 MG: 40 TABLET, DELAYED RELEASE ORAL at 06:36

## 2024-03-19 RX ADMIN — ENOXAPARIN SODIUM 40 MG: 100 INJECTION SUBCUTANEOUS at 09:29

## 2024-03-19 RX ADMIN — Medication: at 09:26

## 2024-03-19 RX ADMIN — ATORVASTATIN CALCIUM 10 MG: 10 TABLET, FILM COATED ORAL at 09:08

## 2024-03-19 RX ADMIN — Medication: at 20:37

## 2024-03-19 RX ADMIN — POLYMYXIN B SULFATE, BACITRACIN ZINC, NEOMYCIN SULFATE: 5000; 3.5; 4 OINTMENT TOPICAL at 09:27

## 2024-03-19 RX ADMIN — SODIUM CHLORIDE, PRESERVATIVE FREE 10 ML: 5 INJECTION INTRAVENOUS at 09:28

## 2024-03-19 RX ADMIN — CEFTRIAXONE SODIUM 1000 MG: 1 INJECTION, POWDER, FOR SOLUTION INTRAMUSCULAR; INTRAVENOUS at 09:13

## 2024-03-19 RX ADMIN — LACTULOSE 20 G: 20 SOLUTION ORAL at 09:06

## 2024-03-19 RX ADMIN — LEVOTHYROXINE SODIUM 50 MCG: 0.09 TABLET ORAL at 06:36

## 2024-03-19 RX ADMIN — CALCITONIN SALMON 1 SPRAY: 200 SPRAY, METERED NASAL at 09:28

## 2024-03-19 RX ADMIN — SODIUM CHLORIDE: 9 INJECTION, SOLUTION INTRAVENOUS at 09:24

## 2024-03-19 RX ADMIN — PREGABALIN 75 MG: 75 CAPSULE ORAL at 20:31

## 2024-03-19 NOTE — CARE COORDINATION
Transition of Care Plan:    RUR: 19%  Prior Level of Functioning: Needs assistance with ADLs  Disposition: Home with family support and Hospice-  Follow up appointments: Follow up with PCP and/or Specialist   DME needed: Hospice will provide   Transportation at discharge: BLS transport to be arranged   IM/IMM Medicare/ letter given: 2nd IM Medicare Letter   Is patient a  and connected with [facility]? N/A   If yes, was  transfer form completed and [facility] notified? N/A  Caregiver Contact: Nimesh Weathers (spouse) [phone]  Discharge Caregiver contacted prior to discharge? Family to be contacted   Care Conference needed? Not at this time   Barriers to discharge:  Medical Clearance       KENDALL spoke with Luciano at [facility]: [phone], via telephone regarding referral sent on 3/18/24.  KENDALL informed that he was able to speak with pts spouse, and access the home.  Agency is able to assist with hospice care at the time of d/c.  Luciano aware that pts spouse currently working on cleaning out his 3rd bedroom, in order for DME to be delivered.  Luciano requested that d/c to be on 3/21/24.  Luciano able to have DME delivered to pts home on 3/20 afternoon.    KENDALL spoke with pts spouse: Nimesh and it was agreeable that he will have room prepared to accept pt on 3/21/24.      KENDALL will inform clinical team of the following.    ,YAZMIN Garcia CM  [phone]

## 2024-03-19 NOTE — TELEPHONE ENCOUNTER
Telephone call to  Nimesh, he states that patient is scheduled to come home with hospice on Thursday.  He states he almost has the dining room cleaned out and ready for her.  Advised that Maria Guadalupe Ozuna NP will no longer be patient's primary care, that hospice doctor will then be her attending.

## 2024-03-19 NOTE — WOUND CARE
Wound Care consulted for this patient for skin tear to the forearm. This was present on admission.   Nursing protocol for skin tears fits this situation and needs to be done by nursing. No photo available for this patient at this time.   Wound care to be done by nursing today: Full skin assessment and flow sheet need to be done by nursing.   Skin Tear care pre protocol:  Cleanse the skin tear wound with NS and wipe clean with gauze. Apply Xeroform gauze to the wounds and then cover with ABD and wrap with small roll brayan and tape on top of the dressing. Date the dressing.     Marcella Cervantes RN, BSN, CWON

## 2024-03-20 DIAGNOSIS — G25.0 ESSENTIAL TREMOR: ICD-10-CM

## 2024-03-20 DIAGNOSIS — F41.8 OTHER SPECIFIED ANXIETY DISORDERS: ICD-10-CM

## 2024-03-20 LAB
ANION GAP SERPL CALC-SCNC: 3 MMOL/L (ref 5–15)
BUN SERPL-MCNC: 22 MG/DL (ref 6–20)
BUN/CREAT SERPL: 18 (ref 12–20)
CALCIUM SERPL-MCNC: 8.7 MG/DL (ref 8.5–10.1)
CHLORIDE SERPL-SCNC: 106 MMOL/L (ref 97–108)
CO2 SERPL-SCNC: 27 MMOL/L (ref 21–32)
CREAT SERPL-MCNC: 1.19 MG/DL (ref 0.55–1.02)
GLUCOSE BLD STRIP.AUTO-MCNC: 104 MG/DL (ref 65–117)
GLUCOSE BLD STRIP.AUTO-MCNC: 107 MG/DL (ref 65–117)
GLUCOSE BLD STRIP.AUTO-MCNC: 88 MG/DL (ref 65–117)
GLUCOSE BLD STRIP.AUTO-MCNC: 95 MG/DL (ref 65–117)
GLUCOSE SERPL-MCNC: 76 MG/DL (ref 65–100)
POTASSIUM SERPL-SCNC: 3.8 MMOL/L (ref 3.5–5.1)
SERVICE CMNT-IMP: NORMAL
SODIUM SERPL-SCNC: 136 MMOL/L (ref 136–145)

## 2024-03-20 PROCEDURE — 51702 INSERT TEMP BLADDER CATH: CPT

## 2024-03-20 PROCEDURE — 2580000003 HC RX 258: Performed by: NURSE PRACTITIONER

## 2024-03-20 PROCEDURE — 6360000002 HC RX W HCPCS: Performed by: NURSE PRACTITIONER

## 2024-03-20 PROCEDURE — 80048 BASIC METABOLIC PNL TOTAL CA: CPT

## 2024-03-20 PROCEDURE — 36415 COLL VENOUS BLD VENIPUNCTURE: CPT

## 2024-03-20 PROCEDURE — 1100000000 HC RM PRIVATE

## 2024-03-20 PROCEDURE — 6370000000 HC RX 637 (ALT 250 FOR IP): Performed by: NURSE PRACTITIONER

## 2024-03-20 PROCEDURE — 82962 GLUCOSE BLOOD TEST: CPT

## 2024-03-20 RX ORDER — ALPRAZOLAM 0.25 MG/1
0.25 TABLET ORAL 2 TIMES DAILY
Qty: 60 TABLET | Refills: 2 | OUTPATIENT
Start: 2024-03-20

## 2024-03-20 RX ORDER — ENOXAPARIN SODIUM 100 MG/ML
30 INJECTION SUBCUTANEOUS DAILY
Status: DISCONTINUED | OUTPATIENT
Start: 2024-03-21 | End: 2024-03-21 | Stop reason: HOSPADM

## 2024-03-20 RX ADMIN — Medication: at 20:34

## 2024-03-20 RX ADMIN — PREGABALIN 75 MG: 75 CAPSULE ORAL at 12:04

## 2024-03-20 RX ADMIN — ATORVASTATIN CALCIUM 10 MG: 10 TABLET, FILM COATED ORAL at 12:03

## 2024-03-20 RX ADMIN — SODIUM CHLORIDE, PRESERVATIVE FREE 10 ML: 5 INJECTION INTRAVENOUS at 12:09

## 2024-03-20 RX ADMIN — CEFTRIAXONE SODIUM 1000 MG: 1 INJECTION, POWDER, FOR SOLUTION INTRAMUSCULAR; INTRAVENOUS at 12:19

## 2024-03-20 RX ADMIN — ENOXAPARIN SODIUM 40 MG: 100 INJECTION SUBCUTANEOUS at 12:03

## 2024-03-20 RX ADMIN — PREGABALIN 75 MG: 75 CAPSULE ORAL at 14:06

## 2024-03-20 RX ADMIN — SODIUM CHLORIDE, PRESERVATIVE FREE 10 ML: 5 INJECTION INTRAVENOUS at 20:33

## 2024-03-20 RX ADMIN — PREGABALIN 75 MG: 75 CAPSULE ORAL at 20:33

## 2024-03-20 RX ADMIN — Medication: at 12:09

## 2024-03-20 RX ADMIN — ALPRAZOLAM 0.25 MG: 0.25 TABLET ORAL at 12:03

## 2024-03-20 RX ADMIN — CALCITONIN SALMON 1 SPRAY: 200 SPRAY, METERED NASAL at 12:01

## 2024-03-20 ASSESSMENT — PAIN SCALES - GENERAL: PAINLEVEL_OUTOF10: 0

## 2024-03-20 NOTE — DISCHARGE INSTRUCTIONS
Right arm skin tear:  Cleanse the skin tear wound with NS and wipe clean with gauze. Apply Xeroform gauze to the wounds and then cover with ABD and wrap with small roll brayan and tape on top of the dressing daily.

## 2024-03-20 NOTE — CARE COORDINATION
Transition of Care Plan:    RUR: 19%  Prior Level of Functioning: Needs assistance with ADLs  Disposition: Home with family support and Hospice[facility]   Follow up appointments: Follow up with PCP and/or Specialist   DME needed: Hospice will provide   Transportation at discharge: BLS transport to be arranged   IM/IMM Medicare/ letter given: 2nd IM Medicare Letter   Is patient a  and connected with [facility]? N/A   If yes, was  transfer form completed and [facility] notified? N/A  Caregiver Contact: Nimesh Weathers (spouse) [phone]  Discharge Caregiver contacted prior to discharge? Family to be contacted   Care Conference needed? Not at this time   Barriers to discharge:  Medical Clearance     CM spoke to liaison from [facility] and agency will be able to start care with pt on 3/21/24 at 10A.    KENDALL arranged transport with [facility].    KENDALL placed transport packet in chart.    YAZMIN Garcia CM  [phone]

## 2024-03-20 NOTE — TELEPHONE ENCOUNTER
Voice mail left from Maria Guadalupe with [facility] regarding electronic cancellation of Gemtesa.  Returned call to [facility] - they are not open until 9 AM.  Left VM advising that Gemtesa should be canceled and additionally that pt is currently hospitalized and will be discharged home on Thursday 3/21 with hospice services.  Encouraged to call our office with any additional questions/concerns.

## 2024-03-21 VITALS
TEMPERATURE: 98.2 F | HEIGHT: 62 IN | SYSTOLIC BLOOD PRESSURE: 110 MMHG | RESPIRATION RATE: 14 BRPM | HEART RATE: 73 BPM | DIASTOLIC BLOOD PRESSURE: 68 MMHG | WEIGHT: 119.05 LBS | BODY MASS INDEX: 21.91 KG/M2 | OXYGEN SATURATION: 94 %

## 2024-03-21 LAB
BACTERIA SPEC CULT: NORMAL
BACTERIA SPEC CULT: NORMAL
GLUCOSE BLD STRIP.AUTO-MCNC: 104 MG/DL (ref 65–117)
GLUCOSE BLD STRIP.AUTO-MCNC: 92 MG/DL (ref 65–117)
SERVICE CMNT-IMP: NORMAL

## 2024-03-21 PROCEDURE — 82962 GLUCOSE BLOOD TEST: CPT

## 2024-03-21 PROCEDURE — 6370000000 HC RX 637 (ALT 250 FOR IP): Performed by: NURSE PRACTITIONER

## 2024-03-21 PROCEDURE — 6360000002 HC RX W HCPCS: Performed by: INTERNAL MEDICINE

## 2024-03-21 PROCEDURE — 51702 INSERT TEMP BLADDER CATH: CPT

## 2024-03-21 RX ORDER — POLYETHYLENE GLYCOL 3350 17 G/17G
17 POWDER, FOR SOLUTION ORAL DAILY PRN
Qty: 527 G | Refills: 0 | Status: SHIPPED | OUTPATIENT
Start: 2024-03-21 | End: 2024-05-22

## 2024-03-21 RX ADMIN — Medication: at 09:28

## 2024-03-21 RX ADMIN — ATORVASTATIN CALCIUM 10 MG: 10 TABLET, FILM COATED ORAL at 09:27

## 2024-03-21 RX ADMIN — PREGABALIN 75 MG: 75 CAPSULE ORAL at 09:27

## 2024-03-21 RX ADMIN — ALPRAZOLAM 0.25 MG: 0.25 TABLET ORAL at 09:27

## 2024-03-21 RX ADMIN — PANTOPRAZOLE SODIUM 40 MG: 40 TABLET, DELAYED RELEASE ORAL at 09:33

## 2024-03-21 RX ADMIN — CALCITONIN SALMON 1 SPRAY: 200 SPRAY, METERED NASAL at 09:28

## 2024-03-21 RX ADMIN — ENOXAPARIN SODIUM 30 MG: 100 INJECTION SUBCUTANEOUS at 09:27

## 2024-03-21 NOTE — PROGRESS NOTES
Hospitalist Progress Note    NAME:   Eugenia Weathers   : 1940   MRN: [id number]     Date/Time: 3/17/2024 11:33 AM  Patient PCP: Deysi Ozuna APRN - NP    Estimated discharge date: >48 hours  Barriers: URC, clinical stability, hospice/DC disposition      Assessment / Plan:      Acute cystitis w/hematuria  BC 3/15: NGTD  UA 3/15: Nitrite positive, leuko-esterase large, WBC , bacteria 3+  URC 3/15: gram negative rods, pending sensitivity  Lactic: 0.8  Procalcitonin: 0.13  - will need snf placement likely, with possible hospice  - continue antibiotic coverage with Rocephin     Debility  Persistent ambulatory dysfunction S/P Rehab  Met cancer w/functional decline  - palliative care consult ordered, appreciate expertise  - PT/OT consult for eval and treatment  - CM consult for DC needs  - fall precautions, up w/assist  - hospice following  - oncology Dr Del Rio consult ordered, courtesy consult per  request     CKD  - continue with gentle IV hydration   - monitor renal function  -  avoid nephrotoxins     Mild hyperglycemia w/o known h/o DM  - POC BG checks     Chronic pain/DDD  - continue PTA  Pregabalin and Tylenol      Metastatic breast cancer  - sees Dr Del Rio, courtesy consult ordered per request     OAB  - gemtesa NF, sked  to bring in, he would be ok with substitution if needed     Hypothyroidism/  - continue PTA Synthroid     Dementia  Mood disorder   Unspecified/anxiety  - continue PTA Alprazolam     Osteoporosis/parathyroid dx  - continue PTA  calcitonin      Essential tremor  - symptom management     Chronic pruritus   - home atarax prn- continued     H/o rheumatic fever        Medical Decision Making:   I personally reviewed labs: BMP, CBC, URC  I personally reviewed imaging: none  I personally reviewed EKG: NSR  Toxic drug monitoring: none  Discussed case with: attending        Code Status: DNR  DVT Prophylaxis: Lovenox  GI Prophylaxis: Protonix    Subjective:     Chief 
      Hospitalist Progress Note    NAME:   Eugenia Weathers   : 1940   MRN: [id number]     Date/Time: 3/20/2024 7:49 AM  Patient PCP: Deysi Ozuna APRN - NP    Estimated discharge date: 3/21/24  Barriers: Heme/oncology signed off. CM arranging equipment for home delivery. Patient and  wish to return home with hospice services instead of going to LTC. Both verbalize understanding of risks of returning home including falls. Patient scheduled to be discharged on 3/21/24 home with hospice services.      Assessment / Plan:  Acute cystitis with hematuria        OAB        Urinary retention        E. Coli UTI  lactic 0.8, procal 0.13 3/15/24  - preliminary blood cultures on 3/15/24 shows NGTD  - final urine culture on 3/15/24 shows e.coli  - hold gemtesa  - strict I&O  - continue becerril catheter  - ultrasound retroperitoneal on 3/18/24 shows:  Distended bladder with layering debris  Echogenic, atrophic kidneys, consistent with CKD  No hydronephrosis  - patient to complete rocephin IV for 6 days of therapy on 3/20/24.  - appreciate urology input:  Patient plans to discharge with hospice  Discharge with becerril catheter and continue management per hospice  - urology signed off    2. Debility      Persistent ambulatory dysfunction status post rehab      Metastatic cancer with functional decline      Metastatic breast cancer  - PT/OT recommend SNF  - fall precautions  - appreciate heme/onc input  Patient too poor for chemotherapy  Recommend hospice  - Hospice services at discharge  - palliative care signed off  - heme/onc signed off    3. CKD  - baseline creatinine 1.11-1.43  - creatinine 1.19  - avoid nephrotoxic medications     4. Chronic pain      Degenerative disc disease      Osteoporosis  - chest xray 3/19/24 shows:  No acute abnormalities  - continue tylenol, pregabalin, and calcitonin  - fall precautions        5. Hypothyroidism      Parathyroid diagnosis  - continue synthroid and calcitonin     6. 
      Hospitalist Progress Note    NAME:   Eugenia Weathers   : 1940   MRN: [id number]     Date/Time: 3/19/2024 8:20 AM  Patient PCP: Deysi Ozuna APRN - NP    Estimated discharge date: greater than 24 hours  Barriers: Heme/onc following. Hospice at discharge, consulted. PT/OT recommend SNF placement however  is adamant on taking patient home with hospice services. CM to follow for discharge planning to home with hospice.      Assessment / Plan:  Acute cystitis with hematuria        OAB        Urinary retention        E. Coli UTI  - lactic 0.8, procal 0.13 3/15/24  - preliminary blood cultures on 3/15/24 shows NGTD  - final urine culture on 3/15/24 shows e.coli  - hold gemtesa  - strict I&O  - continue becerril catheter  - ultrasound retroperitoneal on 3/18/24 shows:  Distended bladder with layering debris  Echogenic, atrophic kidneys, consistent with CKD  No hydronephrosis  - continue rocephin IV until 3/20/24 to complete 6 days of therapy  - appreciate urology input:  Continue to hold gemtesa  Voiding trial in 3 days 3/21/24  If discharge prior to VT maintain becerril catheter and attempt voiding trial at rehab  If fails VT schedule outpatient appointment with [facility]  - urology signed off    2. Debility      Persistent ambulatory dysfunction status post rehab      Metastatic cancer with functional decline      Metastatic breast cancer  - PT/OT recommend SNF  - fall precautions  - appreciate heme/onc input  Patient too poor for chemotherapy  Recommend hospice  - Hospice at discharge, consulted  - palliative care signed off  - heme/onc signed off    3. CKD  - baseline creatinine 1.11-1.43  - creatinine 1.31  - patient is clinically dry this am  - IVF  - avoid nephrotoxic medications  - monitor lab work     4. Chronic pain      Degenerative disc disease      Osteoporosis  - chest xray 3/19/24 negative for acute abnormalities  - continue tylenol, pregabalin, and calcitonin  - fall precautions      
  Physician Progress Note      PATIENT:               ALEXANDER DUPREE  CSN #:                  [id number]  :                       1940  ADMIT DATE:       3/15/2024 5:33 PM  DISCH DATE:  RESPONDING  PROVIDER #:        Feliz Stoddard MD          QUERY TEXT:    Patient admitted with Debility.  Noted documentation of \"Mild cynthia on ckd\" per   H&P dated 3/15.  In order to support the diagnosis of CYNTHIA, please include   additional clinical indicators in your documentation.? Or please document if   the diagnosis of CYNTHIA has been ruled out after further study.    The medical record reflects the following:  Risk Factors: Hx: Neuropathy...C/o  Clinical Indicators: (3/15) Bun/Cr: 24/1.06; GFR: 53; (3/16): 22/0.98; GFR:   58; (3/17): Bun/Cr: 21/1.08; GFR: 52; (3/18): 21/1.09; GFR: 51; (3/19):   21/1.31; GFR: 41.....Previous admit (): Bun/Cr: 34/0.91; GFR: >60    3/15 AP: \"Mild cynthia on ckd, likely 2/2 mild dehydration and acute cystitis\".    3/18 NP query response: \"The patient has chronic kidney disease stage 1\".    3/19 IM PN: \"CKD  - baseline creatinine 1.11-1.43  - creatinine 1.31\".      Treatment: IVF Hydration; Trend Kidney Function;    Thank you,    Maria Luz Geiger  CDI  [email or web address]    Defined by Kidney Disease Improving Global Outcomes (KDIGO) clinical practice   guideline for acute kidney injury:  -Increase in SCr by greater than or equal to 0.3 mg/dl within 48 hours; or  -Increase or decrease in SCr to greater than or equal to 1.5 times baseline,   which is known or presumed to have occurred within the prior 7 days; or  -Urine volume < 0.5ml/kg/h for 6 hours.  Options provided:  -- Acute kidney injury evidenced by, Please document evidence as well as a   numerical baseline creatinine, if known.  -- Currently resolved acute kidney injury was evidenced by, Please document   evidence as well as a numerical baseline creatinine, if known.  -- Acute kidney injury ruled out after study  -- Other - I will add my 
  Physician Progress Note      PATIENT:               ALEXANDER DUPREE #:                  [id number]  :                       1940  ADMIT DATE:       3/15/2024 5:33 PM  DISCH DATE:  RESPONDING  PROVIDER #:        Cookie Blackmon NP        QUERY TEXT:    Stage of Chronic Kidney Disease: Please provide further specificity, if known.    Clinical indicators include: ckd, bun, creatinine, chronic kidney disease  Options provided:  -- Chronic kidney disease stage 1  -- Chronic kidney disease stage 2  -- Chronic kidney disease stage 3  -- Chronic kidney disease stage 3a  -- Chronic kidney disease stage 3b  -- Chronic kidney disease stage 4  -- Chronic kidney disease stage 5  -- Chronic kidney disease stage 5, requiring dialysis  -- End stage renal disease  -- Other - I will add my own diagnosis  -- Disagree - Not applicable / Not valid  -- Disagree - Clinically Unable to determine / Unknown        PROVIDER RESPONSE TEXT:    The patient has chronic kidney disease stage 1.      Electronically signed by:  Cookie Blackmon NP 3/18/2024 3:03 PM          
-Hematology / Oncology ([facility]) -  -Primary Oncologist- Dr Del Rio  --    -S-  No new complaints, feeling tired/fatigued    -O-    Patient Vitals for the past 24 hrs:   Temp Pulse Resp BP SpO2   03/19/24 0815 -- -- -- -- 93 %   03/19/24 0329 99 °F (37.2 °C) 79 18 (!) 108/59 91 %   03/18/24 1936 98.6 °F (37 °C) 80 18 129/62 91 %   03/18/24 1530 97.9 °F (36.6 °C) 61 14 128/60 97 %     No intake/output data recorded.    ROS: 12 point obtained and negative other than stated in HPI    PE:  Gen: nad  Chest: bilateral breath sounds present  Cardiac: rrr  Abd: s/nt    -Labs-    Recent Labs     03/17/24  0333 03/18/24  0255 03/19/24  0650   WBC 7.1 9.2 8.7   HGB 9.7* 10.4* 9.8*    274 271    138 137   K 4.1 4.1 3.9   BUN 21* 21* 21*       -Imaging-   CT Result (most recent):  CT ABDOMEN PELVIS WO IV CONTRAST 01/14/2024    Narrative  EXAM: CT ABDOMEN PELVIS WO CONTRAST    INDICATION: cancer staging    COMPARISON: Chest CT 1/13/2024    IV CONTRAST: None.    ORAL CONTRAST: Oral contrast was administered to better evaluate the bowel.    TECHNIQUE:  Thin axial images were obtained through the abdomen and pelvis. Coronal and  sagittal reformats were generated. CT dose reduction was achieved through use of  a standardized protocol tailored for this examination and automatic exposure  control for dose modulation.    The absence of intravenous contrast material reduces the sensitivity for  evaluation of the vasculature and solid organs.    FINDINGS:  LOWER THORAX: Spiculated mass in the left upper lobe measuring 2.9 x 2 cm.  LIVER: Subcentimeter hypodensity in the left lobe, incompletely characterized.  BILIARY TREE: Gallbladder is within normal limits. CBD is not dilated.  SPLEEN: within normal limits.  PANCREAS: No focal abnormality.  ADRENALS: Unremarkable.  KIDNEYS/URETERS: No calculus or hydronephrosis.  STOMACH: Unremarkable.  SMALL BOWEL: No dilatation or wall thickening.  COLON: Fecal stasis in the colon. No evidence 
.End of Shift Note    Bedside shift change report given to VINI Gonzalez (oncoming nurse) by Nabila Tay RN (offgoing nurse).  Report included the following information SBAR, Kardex, and MAR    Shift worked: 7a-7p     Shift summary and any significant changes:    Medications given per MAR and education provided. Wound care, becerril care, and bath completed. Patient is scheduled to D/C tomorrow 3/21 at 10 AM.        Nabila Tay, RN                              
.I have reviewed discharge instructions with the patient. The patient verbalized understanding. Discharge medications reviewed with patient and appropriate educational materials and side effects teaching were provided. Follow-up appointments reviewed. Opportunity for questions and clarification was provided.  Venous access removed without difficulty.  Patient's belongings gathered and sent with patient. Patient is ready for discharge, home with hospice. Ott remains in place for management by hospice.     Nabila Tay RN   
[facility]  Good Help to Those in Need  [phone]    Nursing Note   Patient Name: Eugenia Weathers  YOB: 1940  Age: 83 y.o.    [facility] RN Note:      Per Nia ENCARNACION note, plan is for patient and spouse to talk with Oncology tomorrow to get their input.  Our team will continue follow and be available to answer questions in relation to hospice.    VIRGINIA PastorN, RN, [facility]  Hospice Liaison  [phone] (mobile)  [phone] (main [facility] number)  Available on [employer]    
[facility]  Good Help to Those in Need  [phone]    Nursing Note   Patient Name: Eugenia Weathers  YOB: 1940  Age: 83 y.o.    [facility] RN Note:      Referral received and hospice consult noted. Chart reviewed.  Will see and assess patient then contact family if they are not present.      CM: if patient/family decide to go home with hospice services at address/zip code listed in chart ([id number]), BS will need to deter due to capacity at this zip code.  Will update after hospice meeting.    If there are any questions, please contact this writer or the main [facility] number (see below).    AIDEN Pastor, RN, [facility]  Hospice Liaison  [phone] (mobile)  [phone] (main [facility] number)  Available on [employer]    9756:  Spoke with Gertrude SCHROEDER who provided an update on patient's condition.  Entered room and patient sitting up in bed, no family/visitors present.  Patient is only alert to self.    Called and spoke with Nimesh Weathers/spouse.  Offered to meet with spouse but he stated that he does not drive and would need to get a taxi to come to the hospital.  Informed that this writer would be glad to provide hospice information by phone which he agreed to.  Provided hospice information and services provided under the Medicare Hospice Benefit.  Spouse stated that he cannot care for the patient due to his medical condition.  Informed that if placement is needed with hospice services, Medicare does not cover room & board.  Mr Weathers asked questions about the patient treatment plan and wants to talk with the Oncologist or the attending.  Informed that this writer will be glad to send a message to the attending, Dr Palma, to inform of his request.  Before ending call, provided [facility] contact number and shared that our team will visit patient daily and to call us if he has any questions in relation to hospice.    PS message sent to Dr Palma with spouse's request.              
End of Shift Note    Bedside shift change report given to ELDA Dahl (oncoming nurse) by Lisa Lebron LPN (offgoing nurse).  Report included the following information SBAR, Kardex, and MAR    Shift worked:  [id number]     Shift summary and any significant changes:     Wound care completed.. Patient had no complaints of n/v. Patient had no complaint of pain. Morning labs were obtained. Patient rested well during the shift. Ott and incontinent care provided. Hourly rounding completed.          Lisa Lebron LPN                            
End of Shift Note    Bedside shift change report given to ELDA Dahl (oncoming nurse) by Lisa Lebron LPN (offgoing nurse).  Report included the following information SBAR, Kardex, and MAR    Shift worked:  [id number]     Shift summary and any significant changes:    Patient received Lyrica per MAR. Patient refused constipation because she had a bowel movement at the beginning of the shift and said \"I have gone enough\". Patient had no complaint of n/v. Patient had no complaint of pain. Hourly rounding completed.            Lisa Lebron LPN                            
End of Shift Note    Bedside shift change report given to Gertrude SCHROEDER (oncoming nurse) by Bella Boone LPN (offgoing nurse).  Report included the following information SBAR, Kardex, and MAR    Shift worked:  7p-7a      Shift summary and any significant changes:     -VSS  -A&Ox1  -pt didn't ask for pain medication during shift  -Incontinence care completed  -Voiding   -no BM during this shift   Pt turned Q2 hours and heels were elevated off the bed  -Ceftriaxone completed during shift   -LR continuous @ 75ml/hr           Bella Boone LPN                            
End of Shift Note    Bedside shift change report given to Gertrude SCHROEDER (oncoming nurse) by Staci Melendez RN (offgoing nurse).  Report included the following information SBAR, Kardex, MAR, Recent Results, and Cardiac Rhythm NSR    Shift worked: Night   Shift summary and any significant changes:    Arrived in the unit from ED on a stretcher and unable to transfer by self. Noted to have tremors of the upper limbs and pain on the lower limbs ; worst on the right knee. Minimal admission history taken as the pt is confused and only repeating herself. VS stable and Labs drawn this morning. Needs assistance to turn in bed.  Passed swallow screen and given PO meds with apple sauce, washed down with soda       Staci Melendez RN                            
End of Shift Note    Bedside shift change report given to Jie SCHROEDER  (oncoming nurse) by Elizabeth Porter RN (offgoing nurse).  Report included the following information SBAR, Kardex, Intake/Output, and MAR    Shift worked:  7a - 7p      Shift summary and any significant changes:    Pt tolerated care fairly well. Medications given per MAR. Patient had no complaints of pain or nausea. Patient was retaining urine - bladder scanned this morning and was 617mL post void. Straight cathed patient and urology consulted. Patient was bladder scanned again and was retaining 525mL - becerril placed due to retention. Patient turned every 2 hours. Venelex applied to redness on skin. Hourly rounding completed   Concerns for physician to address:      Zone phone for oncoming shift:           Elizabeth Porter RN                            
End of Shift Note    Bedside shift change report given to RN (oncoming nurse) by Bella Boone LPN (offgoing nurse).  Report included the following information SBAR, Kardex, and MAR    Shift worked:  7p-7a      Shift summary and any significant changes:     -VSS  -A&Ox3  -pt didn't ask for pain medication during shift  -Incontinence care completed  -Voiding   -no BM during this shift   Pt turned Q2 hours and heels were elevated off the bed  -Ceftriaxone completed during shift   -LR continuous @ 75ml/hr           Bella Boone LPN                            
End of Shift Note    Bedside shift change report given to VINI Marquez (oncoming nurse) by URIEL ALANIS RN (offgoing nurse).  Report included the following information SBAR, Kardex, and MAR    Shift worked:  7am-7pm     Shift summary and any significant changes:     Pt tolerated care fairly well. Medications were given and education was provided. Hourly rounding completed. Pt made no complaints of pain during this shift. Incontinence care completed; pt had a BM during this shift. Linen changed. Pt turned Q2 hours and heels were elevated off the bed.  present at bedside. Pt set up for all meals.          URIEL ALANIS RN                            
End of Shift Note    Bedside shift change report given to VINI Marquez (oncoming nurse) by URIEL ALANIS RN (offgoing nurse).  Report included the following information SBAR, Kardex, and MAR    Shift worked:  7am-7pm     Shift summary and any significant changes:     Pt tolerated care fairly well. Medications were given and education was provided. Hourly rounding completed. Pt made no complaints of pain during this shift. Incontinence care completed; pt had a BM during this shift. Pt turned Q2 hours and heels were elevated off the bed.  called and updated. Pt set up for all meals.          URIEL ALANIS RN                            
Hospice Liaison Note: notified that pt and spouse would like to dc home w/hospice services. We are currently at capacity in pt's zip code. Spoke with Laura ARGUETA she will send referral to alternate agency.   
Occupational Therapy    Chart reviewed in prep for skilled OT treatment; however, per chart, pt is now transitioning to hospice care.      Per chart, previous DME recommendation as follows: bedside commode, hospital bed, transfer bench, and wheelchair 16 inch with drop arm.    Thank you,  Matthew Kerley, OT    
Physical Therapy    Pt now transitioning to hospice care. Equipment recommended after previous session:  bedside commode, hospital bed, transfer bench, and wheelchair 16 inch with drop arm. If therapy services are available through hospice to help family appropriately set up equipment and safely use with pt, this would be beneficial. Will sign off at this time.    Charito Patrick, PT  
Spiritual Care Assessment/Progress Note  [facility]    Name: Eugenia Weathers MRN: [id number]    Age: 83 y.o.     Sex: female   Language: English     Date: 3/18/2024            Total Time Calculated: 12 min              Spiritual Assessment begun in [facility] MEDICAL ONCOLOGY  Service Provided For:: Patient  Referral/Consult From:: Rounding  Encounter Overview/Reason : Initial Encounter    Spiritual beliefs:      [] Involved in a morgan tradition/spiritual practice:      [] Supported by a morgan community:      [] Claims no spiritual orientation:      [] Seeking spiritual identity:           [] Adheres to an individual form of spirituality:      [] Not able to assess:                Identified resources for coping and support system:   Support System: Unknown       [x] Prayer                  [] Devotional reading               [] Music                  [] Guided Imagery     [] Pet visits                                        [] Other: (COMMENT)     Specific area/focus of visit   Encounter:    Crisis:    Spiritual/Emotional needs: Type: Spiritual Support  Ritual, Rites and Sacraments:    Grief, Loss, and Adjustments:    Ethics/Mediation:    Behavioral Health:    Palliative Care:    Advance Care Planning:      Plan/Referrals: No future visits requested    Narrative:   Visit was in 3412 for initial spiritual assessment. Patient seemed hard to hear. She was however welcoming and engaging. She stated she guess she is doing better, and spoke at length about her medical condition. She has been admitted in this facility on several occasions. Thoughts and feelings affirmed, assured of prayer, which she understood and also assured  of her prayer. Please contact [facility] health for further referrals     Visited by: Chaplain Coleman Friedman M.Div., [facility].   Paging Service: STACIA ([phone])  
to decline, states he was told she would live approx 6-9 mos, but he feels her time will be much sooner than that given her continued rapid decline. States he noted back last July when he was in the hospital for about a month with his cva that when he came home, she had lost weight, the animals at home were thin, and the \"house was in shambles\". States he has since managed to get things straight, but states he is concerned with dementia of patient and her numerous medical conditions and now has stage 4 metastatic breast cancer, with mets to lung, liver and lymph nodes.  states other than pt being very weak and unable to now walk, whereas before she was using a walker, states he is unable to lift or move her, or help ambulate her at home due to his cva and residual deficits from weakness and hemiplegia. Pt  denies any knowledge of recent infectious exposures, fevers, or patient having verbalized any complaints of n/v/d, abd pain, back pain. States her vitals were reported stable at the snf prior to d/c from there 3/15. States pt exposed to covid in December but didn't get covid per the . No other exposures he is aware of. He denies any other concerns or complaints or any other patient reported concerns. He denies any knowledge of recent falls or injuries. Patient denies any other concerns or complaints on exam. No acute distress noted on exam. Discussed with patient and  RECs, plan of care as above, admission to the hospital. Risk vs benefits of admit/treatment vs non-treatment discussed with patient, at this time they are in agreement with admit to hospital and plan as above. All questions answered to patient's and 's satisfaction on exam. Albeit, pt is only alert to self and just very agreeable with explanation of the plan.    Objective:     VITALS:   Last 24hrs VS reviewed since prior progress note. Most recent are:  Patient Vitals for the past 24 hrs:   BP Temp Temp src Pulse 
hours) at 3/18/2024 0741  Last data filed at 3/18/2024 0406  Gross per 24 hour   Intake --   Output 1700 ml   Net -1700 ml        I had a face to face encounter and independently examined this patient on 3/18/2024, as outlined below: Patient observed resting in bed with eyes opened. Denies complaints. No acute distress noted.  PHYSICAL EXAM:  General: Alert, cooperative  EENT:  EOMI. Anicteric sclerae.  Resp:  CTA bilaterally, no wheezing or rales.  No accessory muscle use  CV:  Regular  rhythm,  No edema. Telemetry: sinus rhythm  GI:  Soft, Non distended, Non tender.  +Bowel sounds  Neurologic:  Alert and oriented X 3, normal speech,   Psych:   Good insight. Not anxious nor agitated  Skin:  No rashes.  No jaundice    Reviewed most current lab test results and cultures  YES  Reviewed most current radiology test results   YES  Review and summation of old records today    NO  Reviewed patient's current orders and MAR    YES  PMH/SH reviewed - no change compared to H&P    Procedures: see electronic medical records for all procedures/Xrays and details which were not copied into this note but were reviewed prior to creation of Plan.      LABS:  I reviewed today's most current labs and imaging studies.  Pertinent labs include:  Recent Labs     03/16/24  0335 03/17/24  0333 03/18/24  0255   WBC 9.4 7.1 9.2   HGB 10.5* 9.7* 10.4*   HCT 33.8* 30.1* 33.1*    253 274     Recent Labs     03/15/24  1907 03/16/24  0335 03/17/24  0333 03/18/24  0255    142 138 138   K 3.9 3.8 4.1 4.1   * 111* 107 106   CO2 29 28 26 27   GLUCOSE 105* 102* 81 90   BUN 24* 22* 21* 21*   CREATININE 1.06* 0.98 1.08* 1.09*   CALCIUM 9.2 8.9 8.6 9.0   MG 2.1  --   --   --        Signed: KANCHAN Peters - NP    40 minutes

## 2024-03-21 NOTE — DISCHARGE SUMMARY
Discharge Summary    Name: Eugenia Weathers  [id number]  YOB: 1940 (Age: 83 y.o.)   Date of Admission: 3/15/2024  Date of Discharge: 3/21/2024  Attending Physician: Israel Polk MD    Discharge Diagnosis: Acute cystitis with hematuria, urinary retention, e.coli UTI, CKD.    Other diagnosis: Debility, persistent ambulatory dysfunction status post rehab, metastatic cancer with functional decline, metastatic breast cancer, chronic pain, degenerative disc disease, osteoporosis, hypothyroidism, parathyroid, dementia, MDD/anxiety, essential tremor, chronic pruritus, impaired skin integrity.    Consultations:  IP CONSULT TO HOSPITALIST  IP CONSULT TO CASE MANAGEMENT  IP CONSULT TO PALLIATIVE CARE  IP CONSULT TO ONCOLOGY  IP CONSULT TO SOCIAL WORK  IP CONSULT TO HOSPICE  IP WOUND CARE NURSE CONSULT TO EVAL  IP CONSULT TO UROLOGY  IP CONSULT TO CASE MANAGEMENT  IP CONSULT TO CASE MANAGEMENT  IP CONSULT TO CASE MANAGEMENT      Brief Admission History/Reason for Admission   Brief Hospital Course by Main Problems:    Mrs. Weathers is a 83 year old female who presented to the emergency room on 3/15/24 with confusion. Patient was treated for e.coli urinary tract infection with intravenous fluids and rocephin IV for a total of 6 days of therapy. Blood cultures shows no growth. Chest xray on 3/19/24 shows no acute abnormalities. Ultrasound retroperitoneal shows no hydronephrosis. Patient was evaluated by urology and becerril catheter was inserted for urinary retention. Becerril catheter to be kept post discharge, gemtesa to be stopped per urology, hospice to manage becerril catheter as outpatient per urology with outpatient follow up.  Urology cleared for discharge.    Patient was evaluated by hematology/oncology during hospitalization. Patient deemed too poor for chemotherapy and recommended hospice.  Patient was evaluated by PT/OT for debility, recommend SNF at discharge however patient and

## 2024-03-22 ENCOUNTER — TELEPHONE (OUTPATIENT)
Facility: CLINIC | Age: 84
End: 2024-03-22

## 2024-03-22 DIAGNOSIS — Z51.5 HOSPICE CARE: Primary | ICD-10-CM

## 2024-03-22 NOTE — TELEPHONE ENCOUNTER
Telephone call to [facility] at [phone], spoke with Mindi who confirmed that patient was opened to hospice services with their agency yesterday 3/21/24.

## 2024-03-26 ENCOUNTER — TELEPHONE (OUTPATIENT)
Facility: CLINIC | Age: 84
End: 2024-03-26

## 2024-03-26 NOTE — TELEPHONE ENCOUNTER
Home Health Orders - Cheryle called from [facility] to update Deysi Ozuna NP that the patient was d/c from [facility].  She states that the patient had a recent diagnosis of covid and respiratory failure.  She said that the patient will need PT/OT and Home Health.  I let Cheryle know that the patient was admitted to Hospice on 3/21/24.  Cheryle acknowledged.    Cheryle's callback if needed is [phone]

## 2024-03-28 NOTE — TELEPHONE ENCOUNTER
Aleena called from [facility] to ask if Deysi Ozuna will sign orders for Eugenia Weathers.  Aleena's callback is [phone].  I let Aleena know that the patient was admitted to [facility] 3/21/24 and we will call if that has changed or been updated with us.  Aleena acknowledged.

## 2024-05-21 ENCOUNTER — HOSPITAL ENCOUNTER (INPATIENT)
Facility: HOSPITAL | Age: 84
LOS: 1 days | Discharge: HOSPICE | DRG: 951 | End: 2024-05-22
Attending: EMERGENCY MEDICINE | Admitting: INTERNAL MEDICINE
Payer: MEDICARE

## 2024-05-21 PROBLEM — R62.7 FAILURE TO THRIVE IN ADULT: Status: ACTIVE | Noted: 2024-01-01

## 2024-05-21 PROCEDURE — 1100000000 HC RM PRIVATE

## 2024-05-21 PROCEDURE — 2580000003 HC RX 258: Performed by: INTERNAL MEDICINE

## 2024-05-21 PROCEDURE — 99285 EMERGENCY DEPT VISIT HI MDM: CPT

## 2024-05-21 PROCEDURE — 6360000002 HC RX W HCPCS: Performed by: FAMILY MEDICINE

## 2024-05-21 RX ORDER — ACETAMINOPHEN 325 MG/1
650 TABLET ORAL EVERY 6 HOURS PRN
Status: DISCONTINUED | OUTPATIENT
Start: 2024-05-21 | End: 2024-05-22 | Stop reason: HOSPADM

## 2024-05-21 RX ORDER — SODIUM CHLORIDE 0.9 % (FLUSH) 0.9 %
5-40 SYRINGE (ML) INJECTION PRN
Status: DISCONTINUED | OUTPATIENT
Start: 2024-05-21 | End: 2024-05-22 | Stop reason: HOSPADM

## 2024-05-21 RX ORDER — ALPRAZOLAM 0.25 MG/1
0.25 TABLET ORAL NIGHTLY PRN
Status: DISCONTINUED | OUTPATIENT
Start: 2024-05-21 | End: 2024-05-22 | Stop reason: HOSPADM

## 2024-05-21 RX ORDER — MORPHINE SULFATE 2 MG/ML
2 INJECTION, SOLUTION INTRAMUSCULAR; INTRAVENOUS EVERY 30 MIN PRN
Status: DISCONTINUED | OUTPATIENT
Start: 2024-05-21 | End: 2024-05-22 | Stop reason: HOSPADM

## 2024-05-21 RX ORDER — SODIUM CHLORIDE 9 MG/ML
INJECTION, SOLUTION INTRAVENOUS PRN
Status: DISCONTINUED | OUTPATIENT
Start: 2024-05-21 | End: 2024-05-22 | Stop reason: HOSPADM

## 2024-05-21 RX ORDER — MORPHINE SULFATE 2 MG/ML
1 INJECTION, SOLUTION INTRAMUSCULAR; INTRAVENOUS EVERY 4 HOURS PRN
Status: DISCONTINUED | OUTPATIENT
Start: 2024-05-21 | End: 2024-05-21

## 2024-05-21 RX ORDER — SODIUM CHLORIDE 0.9 % (FLUSH) 0.9 %
5-40 SYRINGE (ML) INJECTION EVERY 12 HOURS SCHEDULED
Status: DISCONTINUED | OUTPATIENT
Start: 2024-05-21 | End: 2024-05-22 | Stop reason: HOSPADM

## 2024-05-21 RX ORDER — ONDANSETRON 4 MG/1
4 TABLET, ORALLY DISINTEGRATING ORAL EVERY 8 HOURS PRN
Status: DISCONTINUED | OUTPATIENT
Start: 2024-05-21 | End: 2024-05-22 | Stop reason: HOSPADM

## 2024-05-21 RX ORDER — ONDANSETRON 2 MG/ML
4 INJECTION INTRAMUSCULAR; INTRAVENOUS EVERY 6 HOURS PRN
Status: DISCONTINUED | OUTPATIENT
Start: 2024-05-21 | End: 2024-05-22 | Stop reason: HOSPADM

## 2024-05-21 RX ORDER — POLYETHYLENE GLYCOL 3350 17 G/17G
17 POWDER, FOR SOLUTION ORAL DAILY PRN
Status: DISCONTINUED | OUTPATIENT
Start: 2024-05-21 | End: 2024-05-22 | Stop reason: HOSPADM

## 2024-05-21 RX ORDER — LORAZEPAM 2 MG/ML
1 INJECTION INTRAMUSCULAR EVERY 4 HOURS PRN
Status: DISCONTINUED | OUTPATIENT
Start: 2024-05-21 | End: 2024-05-21

## 2024-05-21 RX ORDER — ACETAMINOPHEN 650 MG/1
650 SUPPOSITORY RECTAL EVERY 6 HOURS PRN
Status: DISCONTINUED | OUTPATIENT
Start: 2024-05-21 | End: 2024-05-22 | Stop reason: HOSPADM

## 2024-05-21 RX ORDER — LORAZEPAM 2 MG/ML
1 INJECTION INTRAMUSCULAR EVERY 30 MIN PRN
Status: DISCONTINUED | OUTPATIENT
Start: 2024-05-21 | End: 2024-05-22 | Stop reason: HOSPADM

## 2024-05-21 RX ADMIN — MORPHINE SULFATE 2 MG: 2 INJECTION, SOLUTION INTRAMUSCULAR; INTRAVENOUS at 22:37

## 2024-05-21 RX ADMIN — SODIUM CHLORIDE, PRESERVATIVE FREE 10 ML: 5 INJECTION INTRAVENOUS at 22:38

## 2024-05-21 NOTE — PROGRESS NOTES
[facility]  Good Help to Those in Need  [phone]    Nursing Note   Patient Name: Eugenia Weathers  YOB: 1940  Age: 83 y.o.    [facility] RN Note:      Referral received and hospice consult noted. Chart reviewed. Spoke with Jacy ARGUETA who provided update on patient's situation at home (spouse cannot care for the patient).  Patient seen and assessed and appears to be transitioning to EOL.  Appears comfortable with no signs of distress.  As it is late, this writer will reassess patient tomorrow then contact spouse if he is not present.     VIRGINIA PastorN, RN, [facility]  Hospice Liaison  [phone] ([id number])  [phone] (main [facility] number)  Available on [employer]

## 2024-05-21 NOTE — ED NOTES
TRANSFER - OUT REPORT:    Verbal report given to Effie SCHROEDER on Eugenia Weathers  being transferred to ONC for routine progression of patient care       Report consisted of patient's Situation, Background, Assessment and   Recommendations(SBAR).     Information from the following report(s) Nurse Handoff Report and ED Encounter Summary was reviewed with the receiving nurse.    [place] Fall Assessment:    Presents to emergency department  because of falls (Syncope, seizure, or loss of consciousness): No  Age > 70: Yes  Altered Mental Status, Intoxication with alcohol or substance confusion (Disorientation, impaired judgment, poor safety awaremess, or inability to follow instructions): Yes  Impaired Mobility: Ambulates or transfers with assistive devices or assistance; Unable to ambulate or transer.: Yes  Nursing Judgement: Yes          Lines:       Opportunity for questions and clarification was provided.      Patient transported with:  Tech

## 2024-05-21 NOTE — PROGRESS NOTES
[place] hospice medical director    Chart reviewed.  Patient currently with [facility] hospice and unfortunately revoked hospice today as they could not arrange respite apparently before revocation occurred.   Even if we thought patient was inpatient level care, we cannot admit a patient who has revoked hospice until the next day.  All hospices are supposed to be able to provide respite and GIP level of care but we can reassess in the morning and if GIP appropriate, we will be able to admit tomorrow.  I have taken the liberty to adjust frequency of symptom medications to every 30 minutes as needed as there is no reason for patient to have to wait 3 to 4 hours who is transitioning to end-of-life for symptom management.

## 2024-05-21 NOTE — CARE COORDINATION
5:58 PM  DDNR placed on bedside chart with patient label.     5:42 PM  Signed DDNR faxed to [facility] ED from [facility], KENDALL retrieved from fax and provided to ED MD.    CM received incoming call from on-call RN with [facility], Donna [phone]. Donna reports frequent involvement over the past few days with pt's spouse, advising on care needs and comfort medications. Donna reports pt has been in decline since Friday, pt with increased confusion and new onset on being unaware of who spouse is. Donna states that spouse is main support and only caregiver, has been up for 4 days providing comfort medications to pt as needed every 2-4 hours. Donna endorses spouse's overwhelm with pt's increasing care needs, and Donna reports that pt is actively dying at this time. Revocation of hospice services driven by care needs that were unable to be managed in community by spouse, with Donna reporting that transition to respite care under hospice was not able to occur prior to decision to revoke and return to ED. The above information shared with ED MD.    KENDALL obtained order from ED MD for inpatient consult to hospice for GIP evaluation, and CM contacted [facility] RN liaison to discuss case. Per Medicare guidelines, there is a 24 hour required waiting period following revocation of hospice before pt's can be accepted by a different hospice agency. Hospice to re-evaluate on tomorrow and can proceed with hospice referral/evaluation to see if pt is meeting GIP criteria once 24 hour period has passed. ED MD updated.      5:10 PM  VM left with [facility]'s on-call provider to obtain additional information about situation.    Initial note:  CM acknowledges pt's chief complaint: end of life. Chart reviewed proactively, KENDALL informed by [facility]  [phone] that pt was previously open with this agency for hospice services, which were revoked on today for pt to come to [facility] ED. ED MD

## 2024-05-21 NOTE — H&P
History     Tobacco Use    Smoking status: Every Day    Smokeless tobacco: Never    Tobacco comments:     Quit smokin-5 cigs per day   Substance Use Topics    Alcohol use: No        Family History   Problem Relation Age of Onset    Other Father         prostate surgery/hallucination with pain meds (too much per pt)/tremors    Headache Maternal Grandmother     Kidney Disease Maternal Grandmother     Heart Disease Maternal Grandfather     Heart Disease Paternal Grandmother     Other Paternal Grandmother         overweight    Heart Attack Paternal Grandfather     Breast Cancer Mother         40's    Other Brother         brain aneurysm/blood clot in leg/polio    Cancer Brother         lung    Migraines Mother     Cancer Mother         breast - mets to spinal cord and brain    Stroke Father         mini    Dementia Father        Allergies   Allergen Reactions    Codeine Nausea Only    Nitrous Oxide Other (See Comments)     Blood pressure bottoms out    Penicillins Other (See Comments)     State PCN does not work.    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 Lightheadedness and dizziness, vertigo. Heart \"beating out of my chest\". \"Inhaled a breath but could not exhale\". Was on a fluoride treatment with dentist. Went to ER. \"Gonzales like I was jello\".    Wheat Other (See Comments)     Cannot digest wheat per pt.    Other Nausea And Vomiting     Artificial sweeteners allergy. Does not use corn syrup in diet.        Prior to Admission medications    Medication Sig Start Date End Date Taking? Authorizing Provider   polyethylene glycol (GLYCOLAX) 17 g packet Take 1 packet by mouth daily as needed for Constipation 3/21/24 5/22/24  Cookie Blackmon, APRN - NP   ALPRAZolam (XANAX) 0.25 MG tablet Take 1 tablet by mouth daily. Max Daily Amount: 0.25 mg    Provider, Historical, MD   balsum peru-castor oil (VENELEX) OINT ointment Apply topically 2 times daily 24   Nghia Altamirano MD

## 2024-05-22 ENCOUNTER — HOSPITAL ENCOUNTER (INPATIENT)
Facility: HOSPITAL | Age: 84
LOS: 1 days | Discharge: HOSPICE | End: 2024-05-23
Attending: FAMILY MEDICINE | Admitting: FAMILY MEDICINE
Payer: COMMERCIAL

## 2024-05-22 VITALS
HEIGHT: 60 IN | HEART RATE: 79 BPM | WEIGHT: 123.46 LBS | RESPIRATION RATE: 14 BRPM | SYSTOLIC BLOOD PRESSURE: 105 MMHG | TEMPERATURE: 97.5 F | DIASTOLIC BLOOD PRESSURE: 54 MMHG | OXYGEN SATURATION: 100 % | BODY MASS INDEX: 24.24 KG/M2

## 2024-05-22 PROBLEM — C50.919 BREAST CANCER METASTASIZED TO MULTIPLE SITES, UNSPECIFIED LATERALITY (HCC): Status: ACTIVE | Noted: 2024-05-22

## 2024-05-22 PROCEDURE — 2500000003 HC RX 250 WO HCPCS: Performed by: FAMILY MEDICINE

## 2024-05-22 PROCEDURE — 2580000003 HC RX 258: Performed by: INTERNAL MEDICINE

## 2024-05-22 PROCEDURE — 51702 INSERT TEMP BLADDER CATH: CPT

## 2024-05-22 PROCEDURE — 6360000002 HC RX W HCPCS: Performed by: FAMILY MEDICINE

## 2024-05-22 PROCEDURE — 2580000003 HC RX 258: Performed by: FAMILY MEDICINE

## 2024-05-22 PROCEDURE — 1100000000 HC RM PRIVATE

## 2024-05-22 RX ORDER — LORAZEPAM 2 MG/ML
0.5 INJECTION INTRAMUSCULAR
Status: DISCONTINUED | OUTPATIENT
Start: 2024-05-22 | End: 2024-05-23 | Stop reason: HOSPADM

## 2024-05-22 RX ORDER — HYDROMORPHONE HYDROCHLORIDE 1 MG/ML
0.5 INJECTION, SOLUTION INTRAMUSCULAR; INTRAVENOUS; SUBCUTANEOUS
Status: DISCONTINUED | OUTPATIENT
Start: 2024-05-22 | End: 2024-05-23 | Stop reason: HOSPADM

## 2024-05-22 RX ORDER — BISACODYL 10 MG
10 SUPPOSITORY, RECTAL RECTAL DAILY PRN
Status: DISCONTINUED | OUTPATIENT
Start: 2024-05-22 | End: 2024-05-23 | Stop reason: HOSPADM

## 2024-05-22 RX ORDER — HYDROMORPHONE HYDROCHLORIDE 1 MG/ML
0.5 INJECTION, SOLUTION INTRAMUSCULAR; INTRAVENOUS; SUBCUTANEOUS EVERY 4 HOURS
Status: DISCONTINUED | OUTPATIENT
Start: 2024-05-22 | End: 2024-05-23 | Stop reason: HOSPADM

## 2024-05-22 RX ORDER — ACETAMINOPHEN 650 MG/1
650 SUPPOSITORY RECTAL EVERY 4 HOURS PRN
Status: DISCONTINUED | OUTPATIENT
Start: 2024-05-22 | End: 2024-05-23 | Stop reason: HOSPADM

## 2024-05-22 RX ORDER — GLYCOPYRROLATE 0.2 MG/ML
0.2 INJECTION INTRAMUSCULAR; INTRAVENOUS EVERY 4 HOURS PRN
Status: DISCONTINUED | OUTPATIENT
Start: 2024-05-22 | End: 2024-05-23 | Stop reason: HOSPADM

## 2024-05-22 RX ORDER — SODIUM CHLORIDE 0.9 % (FLUSH) 0.9 %
5-40 SYRINGE (ML) INJECTION PRN
Status: DISCONTINUED | OUTPATIENT
Start: 2024-05-22 | End: 2024-05-23 | Stop reason: HOSPADM

## 2024-05-22 RX ADMIN — LORAZEPAM 1 MG: 2 INJECTION INTRAMUSCULAR; INTRAVENOUS at 06:30

## 2024-05-22 RX ADMIN — HYDROMORPHONE HYDROCHLORIDE 0.5 MG: 1 INJECTION, SOLUTION INTRAMUSCULAR; INTRAVENOUS; SUBCUTANEOUS at 17:31

## 2024-05-22 RX ADMIN — HYDROMORPHONE HYDROCHLORIDE 0.5 MG: 1 INJECTION, SOLUTION INTRAMUSCULAR; INTRAVENOUS; SUBCUTANEOUS at 14:26

## 2024-05-22 RX ADMIN — LORAZEPAM 0.5 MG: 2 INJECTION INTRAMUSCULAR; INTRAVENOUS at 22:36

## 2024-05-22 RX ADMIN — MORPHINE SULFATE 2 MG: 2 INJECTION, SOLUTION INTRAMUSCULAR; INTRAVENOUS at 06:30

## 2024-05-22 RX ADMIN — LORAZEPAM 1 MG: 2 INJECTION INTRAMUSCULAR; INTRAVENOUS at 01:10

## 2024-05-22 RX ADMIN — SODIUM CHLORIDE, PRESERVATIVE FREE 10 ML: 5 INJECTION INTRAVENOUS at 09:25

## 2024-05-22 RX ADMIN — SODIUM CHLORIDE, PRESERVATIVE FREE 10 ML: 5 INJECTION INTRAVENOUS at 22:18

## 2024-05-22 RX ADMIN — HYDROMORPHONE HYDROCHLORIDE 0.5 MG: 1 INJECTION, SOLUTION INTRAMUSCULAR; INTRAVENOUS; SUBCUTANEOUS at 22:18

## 2024-05-22 RX ADMIN — MORPHINE SULFATE 2 MG: 2 INJECTION, SOLUTION INTRAMUSCULAR; INTRAVENOUS at 01:10

## 2024-05-22 ASSESSMENT — PAIN SCALES - GENERAL
PAINLEVEL_OUTOF10: 2
PAINLEVEL_OUTOF10: 5
PAINLEVEL_OUTOF10: 0

## 2024-05-22 ASSESSMENT — PAIN SCALES - WONG BAKER: WONGBAKER_NUMERICALRESPONSE: NO HURT

## 2024-05-22 ASSESSMENT — PAIN DESCRIPTION - DESCRIPTORS: DESCRIPTORS: ACHING

## 2024-05-22 ASSESSMENT — PAIN DESCRIPTION - LOCATION: LOCATION: GENERALIZED

## 2024-05-22 ASSESSMENT — PAIN DESCRIPTION - ORIENTATION: ORIENTATION: POSTERIOR

## 2024-05-22 NOTE — H&P
of breath  Ears/nose/mouth/throat: increased airway secretions  Respiratory:shortness of breath, wheezing  Gastrointestinal:poor appetite, nausea, vomiting, abdominal pain, constipation, diarrhea  Musculoskeletal:pain, deformities, swelling legs  Neurologic:confusion, hallucinations, weakness  Psychiatric:anxiety, feeling depressed, poor sleep  Endocrine:      Adult Non-Verbal Pain Assessment Score: 2 (after PRN symptom medications)     Face  [] 0   No particular expression or smile  [x] 1   Occasional grimace, tearing, frowning, wrinkled forehead  [] 2   Frequent grimace, tearing, frowning, wrinkled forehead     Activity (movement)  [x] 0   Lying quietly, normal position  [] 1   Seeking attention through movement or slow, cautious movement  [] 2   Restless, excessive activity and/or withdrawal reflexes     Guarding  [] 0   Lying quietly, no positioning of hands over areas of body  [x] 1   Splinting areas of the body, tense  [] 2   Rigid, stiff     Physiology (vital signs)  [x] 0   Stable vital signs  [] 1   Change in any of the following: SBP > 20mm Hg; HR > 20/minute  [] 2   Change in any of the following: SBP > 30mm Hg; HR > 25/minute     Respiratory  [x] 0   Baseline RR/SpO2, compliant with ventilator  [] 1   RR > 10 above baseline, or 5% drop SpO2, mild asynchrony with ventilator  [] 2   RR > 20 above baseline, or 10% drop SpO2, asynchrony with ventilator      FUNCTIONAL ASSESSMENT      Palliative Performance Scale (PPS): 10%      HISTORY     Past Medical History:   Diagnosis Date    Adverse effect of anesthesia     decreased BP with nitrous oxide    Chronic kidney disease     kidney stones    Chronic pain     neuropathy - burning feeling    Degenerative disc disease     Hypothyroidism     Ill-defined condition     genital warts    Ill-defined condition     neuropathy in torso    Ill-defined condition     osteopenia    Ill-defined condition     stigmatism right eye    Ill-defined condition     IBS                    Sherwin Muñoz, APRN - CNP

## 2024-05-22 NOTE — DISCHARGE INSTRUCTIONS
Breast Cancer: Care Instructions  Your Care Instructions     Breast cancer occurs when abnormal cells grow out of control in the breast. These cancer cells can spread within the breast, to nearby lymph nodes and other tissues, and to other parts of the body.  Being treated for cancer can weaken your body, and you may feel very tired. Get the rest your body needs so you can feel better.  Finding out that you have cancer is scary. You may feel many emotions and may need some help coping. Seek out family, friends, and counselors for support. You also can do things at home to make yourself feel better while you go through treatment. Call the [employer] Society (1-992.442.7175) or visit its website at www.cancer.org for more information.  Follow-up care is a key part of your treatment and safety. Be sure to make and go to all appointments, and call your doctor if you are having problems. It's also a good idea to know your test results and keep a list of the medicines you take.  How can you care for yourself at home?  Take your medicines exactly as prescribed. Call your doctor if you think you are having a problem with your medicine. You may get medicine for nausea and vomiting if you have these side effects.  Follow your doctor's instructions to relieve pain. Pain from cancer and surgery can almost always be controlled. Use pain medicine when you first notice pain, before it becomes severe.  Eat healthy food. If you do not feel like eating, try to eat food that has protein and extra calories to keep up your strength and prevent weight loss. Drink liquid meal replacements for extra calories and protein. Try to eat your main meal early.  Get some physical activity every day, but do not get too tired. Keep doing the hobbies you enjoy as your energy allows.  Do not smoke. Smoking can make your cancer worse. If you need help quitting, talk to your doctor about stop-smoking programs and medicines. These can increase

## 2024-05-22 NOTE — PLAN OF CARE
Problem: Pain  Goal: Control of acute pain  Description: Patient  will exhibit a decrease in pain as evidenced by a pain rating less than 2 on the Adult Nonverbal Pain scale within 1 hour of receiving pain medication during the inpatient hospice stay.    Outcome: Progressing     Problem: Anxiety/Agitation/Restlessness  Goal: Verbalize or staff assess the ability to manage anxiety  Description: Patient  will demonstrate appropriate behavior as evidenced by less than two episodes of fidgeting each shift during the inpatient hospice stay.      Outcome: Progressing     Problem: Dyspnea Due to End of Life  Goal: Demonstrate understanding of and ability to manage respiratory symptoms at end of life  Description: Patient  and or family/caregiver will verbalize recall of breathing strategies to maintain an effective breathing pattern during the inpatient hospice stay.        Outcome: Progressing     Problem: Genitourinary - Adult  Goal: Absence of urinary retention  Outcome: Progressing  Goal: Urinary catheter remains patent  Outcome: Progressing

## 2024-05-22 NOTE — PROGRESS NOTES
[facility]  Good Help to Those in Need  [phone]    Inpatient Nursing Admission   Patient Name: Eugenia Weathers  YOB: 1940  Age: 83 y.o.       Date of Hospice Admission: 5/22/2024  Hospice Attending Elected by Patient: Jesus Zapata MD  Primary Care Physician: None, None  Admitting RN: Argentina Escalona  : Candace Walls     Level of Care (GIP/Routine/Respite): [facility]  Facility of Care: [facility]  Patient Room: [facility]/     HOSPICE SUMMARY   ER Visits/ Hospitalizations in past year: 6  Hospice Diagnosis: Hospice care [Z51.5]  Breast cancer metastasized to multiple sites, unspecified laterality (HCC) [C50.919]  Onset Date of Hospice Diagnosis: 5/21/2024  Summary of Disease Progression leading to Hospice Diagnosis:   Expand All Collapse All    [facility]   Good Help to Those in Need  [phone]     Patient Name:  Eugenia Weathers  YOB: 1940           Date of Provider Hospice Visit: 05/22/24     Level of Care:   [x] General Inpatient (GIP)              [] Routine                   [] Respite     Current Location of Care:  [] [facility]           [] [facility]         [x] [facility]        [] [facility]           [] [facility] ([facility]     IF [facility], patient referred from:  [] [facility]           [] [facility]         [] [facility]        [] [facility]           [] Home         [] Other:      Date of Original Hospice Admission:  5/22/2024  1:32 PM   Hospice Medical Director at time of admission: Dr. Jesus Zapata      Principle Hospice Diagnosis:   Metastatic breast cancer  Diagnoses RELATED to the terminal prognosis:   Other Diagnoses:       HOSPICE SUMMARY   Eugenia Weathers is an 83 year old female with a past medical history significant for metastatic breast cancer, dementia, anxiety, and major depressive disorder originally admitted to [facility] on 5/21 from home, brought in by EMS per request of patient's spouse. Of note, patient was previously enrolled in hospice care with [facility] at time of admission. Per  his report, her symptoms were escalating since last Friday (5/17), with increased confusion and being unaware of her surroundings, causing severe distress and superimposed delirium with agitation. This led to spouse administering sublingual symptom medications about every 2-4 hours for the following several days, and patient continued to show breakthrough symptoms. Unfortunately, patient's spouse is primary caregiver with no other local support. Spouse became overwhelmed and per review of notes, reached out to [facility] for respite care or better options for symptom management while she is transitioning to the end of life process. Respite was unfortunately not able to be arranged in timely manner, and patient's spouse called EMS for further assistance, leading to hospice revocation. Inpatient hospice consulted for further discussions as patient clearly showed significant symptom burden of signs of pain, agitation, restlessness, and increased work of breathing on ED presentation.      Co-Morbidities:   Patient Active Problem List   Diagnosis    Irritable bowel syndrome without diarrhea    Hypothyroidism    Spinal stenosis, lumbar    Hyperlipidemia    Personal history of DVT (deep vein thrombosis)    History of rheumatic fever    Age-related osteoporosis without current pathological fracture    Weakness generalized    Essential tremor    Neuropathy    Osteoarthritis of spine with radiculopathy, lumbar region    Bilateral leg weakness    Ambulatory dysfunction    Postherpetic neuralgia    Anxiety with depression    Breast mass    Anxiety    Hypoxia    COVID    Acute cystitis with hematuria    Hospice care    Failure to thrive in adult    Breast cancer metastasized to multiple sites, unspecified laterality (HCC)     Diagnoses RELATED to the terminal prognosis: CKD, Hypothyroidism, HTN  Other Diagnoses: dementia    Rationale for a prognosis of life expectancy of 6 months or less if the disease follows its normal

## 2024-05-22 NOTE — DISCHARGE SUMMARY
Discharge Summary    Name: Eugenia Weathers  [id number]  YOB: 1940 (Age: 83 y.o.)   Date of Admission: 5/21/2024  Date of Discharge: 5/22/2024  Attending Physician: Nghia Barrios MD    Discharge Diagnosis:   Metastatic breast cancer  CKD  Chronic pain  Hypothyroidism     Consultations:  IP CONSULT TO HOSPICE      Brief Admission History/Reason for Admission Per Israel Polk MD:   Eugenia Weathers is a 83 y.o.  female with PMHx significant for metastatic cancer, MDD/anxiety, HTN, dementia who is currently under hospice service at home, patient being taken care of by her  at home.   is not present currently, but he reported that for past 4 days  is overwhelmed with her care.  And he cannot take care of for for now, so patient was brought to the ED.  Patient was seen by possible hospice in the ED.  According to them, patient does not qualify for inpatient hospice.  We were asked to admit for work up and evaluation of the above problems.        Brief Hospital Course by Main Problems:   Metastatic breast cancer  CKD  Chronic pain  Hypothyroidism     -Currently under hospice services at home,  is overwhelmed with her care, unable to take care of her.   -Currently does not meet criteria for inpatient hospice  -Admit to hospital, comfort care only  -As needed morphine/lorazepam  -Hospice in patient     Discharge Exam:  Patient seen and examined by me on discharge day.  Pertinent Findings:  Patient Vitals for the past 24 hrs:   BP Temp Temp src Pulse Resp SpO2 Height Weight   05/22/24 0730 (!) 105/54 97.5 °F (36.4 °C) Axillary 79 14 100 % -- --   05/21/24 1930 (!) 120/50 97.7 °F (36.5 °C) Axillary 81 12 93 % 1.524 m (5') 56 kg (123 lb 7.3 oz)   05/21/24 1815 (!) 102/57 -- -- -- -- 96 % -- --   05/21/24 1638 (!) 114/56 99.7 °F (37.6 °C) Axillary 83 18 (!) 89 % 1.524 m (5') 56.5 kg (124 lb 9 oz)       Gen:    Not in distress  Chest: Clear lungs  [facility]:

## 2024-05-22 NOTE — PROGRESS NOTES
End of Shift Note    Bedside shift change report given to Kaye SCHROEDER (oncoming nurse) by Staci Melendez RN (offgoing nurse).  Report included the following information SBAR, Kardex, and MAR    Shift worked: Night   Shift summary and any significant changes:    Received from ED; pt has been on home hospice until yesterday when the spouse called the EMS as he was no longer able to care for her physically as well as financially.Pt non verbal, not A/O but did start to response and open eyes in the course of the night. Given PRN Morphine and Ativan per MAR. Came with a becerril for EOL support , not changed but the bag changed as it was dirty with urine deposits. Assessment done and documented in the EMR.             Staci Melendez RN

## 2024-05-22 NOTE — PROGRESS NOTES
End of Shift Note    Bedside shift change report given to ELDA Rod (oncoming nurse) by ZACK STANLEY RN (offgoing nurse).  Report included the following information SBAR, Kardex, Intake/Output, and MAR    Shift worked:  7837-2084     Shift summary and any significant changes:     Patient repositioned for comfort throughout the shift. Dual skin assessment completed. Pt  visited the patient. Pt to discharge to hospice house tomorrow morning.      Concerns for physician to address:    Zone phone for oncoming shift:       Activity:       Cardiac:   Cardiac Monitoring:  no    Access:  Current line(s): PIV     Genitourinary:        Respiratory:        GI:  Current diet: Diet NPO    Pain Management:   Patient states pain is manageable on current regimen: N/A    Skin:     Interventions:    Pressure injury: no    Patient Safety:  Fall Score:         Active Consults:  None    Length of Stay:  Expected LOS: 1  Actual LOS: 0      ZACK STANLEY RN

## 2024-05-23 VITALS
DIASTOLIC BLOOD PRESSURE: 40 MMHG | RESPIRATION RATE: 10 BRPM | HEART RATE: 74 BPM | OXYGEN SATURATION: 93 % | TEMPERATURE: 97.5 F | SYSTOLIC BLOOD PRESSURE: 116 MMHG

## 2024-05-23 PROBLEM — R45.1 RESTLESSNESS: Status: ACTIVE | Noted: 2024-01-01

## 2024-05-23 PROBLEM — G89.3 CANCER ASSOCIATED PAIN: Status: ACTIVE | Noted: 2024-01-01

## 2024-05-23 PROBLEM — C50.919 BREAST CANCER IN FEMALE (HCC): Status: ACTIVE | Noted: 2024-01-01

## 2024-05-23 PROCEDURE — 6360000002 HC RX W HCPCS: Performed by: FAMILY MEDICINE

## 2024-05-23 PROCEDURE — 2500000003 HC RX 250 WO HCPCS: Performed by: FAMILY MEDICINE

## 2024-05-23 RX ADMIN — LORAZEPAM 0.5 MG: 2 INJECTION INTRAMUSCULAR; INTRAVENOUS at 09:32

## 2024-05-23 RX ADMIN — HYDROMORPHONE HYDROCHLORIDE 0.5 MG: 1 INJECTION, SOLUTION INTRAMUSCULAR; INTRAVENOUS; SUBCUTANEOUS at 01:47

## 2024-05-23 RX ADMIN — HYDROMORPHONE HYDROCHLORIDE 0.5 MG: 1 INJECTION, SOLUTION INTRAMUSCULAR; INTRAVENOUS; SUBCUTANEOUS at 09:32

## 2024-05-23 RX ADMIN — HYDROMORPHONE HYDROCHLORIDE 0.5 MG: 1 INJECTION, SOLUTION INTRAMUSCULAR; INTRAVENOUS; SUBCUTANEOUS at 05:48

## 2024-05-23 ASSESSMENT — PAIN SCALES - GENERAL
PAINLEVEL_OUTOF10: 0
PAINLEVEL_OUTOF10: 2

## 2024-05-23 NOTE — PROGRESS NOTES
Hospice Liaison Note: pt stable for transport to Hospice House. [facility] here on site at 9:35 to transport pt. Bedside RN called report to [facility]. PIV and becerril catheter intact for use at hospice house.

## 2024-05-23 NOTE — PROGRESS NOTES
.End of Shift Note    Bedside shift change report given to ELDA Johnson (oncoming nurse) by Viola Hernandez RN (offgoing nurse).  Report included the following information SBAR, Kardex, and MAR    Shift worked:  7p - 7a     Shift summary and any significant changes:    Medications administered per order, education provided but patient unable to comprehend information.  Patient with increased restlessness/agitation, pulled becerril catheter and broke it.  Patient will get agitated with any stimuli. PRN lorazepam administered x1 with good effect.  All pieces accounted for, new #16 fr becerril inserted, becerril care provided.            Viola Hernandez RN

## 2024-05-23 NOTE — DISCHARGE SUMMARY
Patient determined to be stable for discharge by attending provider. I have reviewed the discharge instructions and follow-up appointments with the hospice facility. They verbalized understanding and all questions were answered to their satisfaction. No complaints or further questions were expressed. Report given to ELDA Laureano at [facility].    All personal items collected during admission were returned to the patient prior to discharge.    Shellie Murry RN

## 2024-05-24 NOTE — DISCHARGE SUMMARY
Hospice Discharge Summary    [facility]  Good Help to Those in Need        Date of Admission: 5/22/2024  Date of Discharge: 5/23/2024    Eugenia Weathers is a 83 y.o. year old who was admitted to [facility] at [facility] with a Hospice diagnosis of Hospice care [Z51.5]  Breast cancer metastasized to multiple sites, unspecified laterality (HCC) [C50.919].        The patient was discharged to [facility] for ongoing Hospice care. [facility]

## 2024-08-30 NOTE — PROGRESS NOTES
Chief Complaint   Patient presents with    Mouth Lesions     cold sore      1. Have you been to the ER, urgent care clinic since your last visit? no  Hospitalized since your last visit? No    2. Have you seen or consulted any other health care providers outside of the [address] [place] [address] since your last visit? No   Include any pap smears or colon screening.  No no numbness/no tingling

## 2025-04-29 NOTE — MR AVS SNAPSHOT
[address] [facility], [address] [place] [address] 
[phone] Patient: Oliver Mcdowell MRN: [id number] Visit Information Date & Time Provider Department Dept. Phone Encounter #  
 6/6/2018  1:30 PM MD Diane Ponce [facility] [phone] [id number] Follow-up Instructions Return in about 4 months (around 10/6/2018) for Medication re-evaluation. Upcoming Health Maintenance Date Due Influenza Age 5 to Adult 8/1/2018 MEDICARE YEARLY EXAM 6/7/2019 GLAUCOMA SCREENING Q2Y 4/2/2020 DTaP/Tdap/Td series (2 - Td) 4/19/2026 COLONOSCOPY 5/23/2026 Allergies as of 6/6/2018  Review Complete On: 6/6/2018 By: Beatrice Juares MD  
  
 Severity Noted Reaction Type Reactions Other Food  05/20/2016    Nausea and Vomiting Artificial sweeteners allergy. Does not use corn syrup in diet. 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 Lightheadedness and dizziness, vertigo. Heart \"beating out of my chest\". \"Inhaled a breath but could not exhale\". Was on a fluoride treatment with dentist. Pasqual Seashore to ER. \"[place] like I was jello\". Nitrous Oxide  12/08/2014   Side Effect Other (comments) Blood pressure bottoms out Other Medication  05/20/2016    Other (comments) Avelox per pt is about the only antibiotic she can take. Not sure of the names of antibiotics she is allergic to. Caused intestinal obstruction. Other Plant, Animal, Environmental  05/20/2016    Other (comments) Mold spores, airborne fungal spores, trees, plants causes sinus infections and pneumonia. Pcn [Penicillins]  05/20/2016    Other (comments) State PCN does not work. Wheat  05/20/2016    Other (comments) Cannot digest wheat per pt. Current Immunizations  Reviewed on 10/4/2017 Name Date Pharmacy requesting refills   3/6/2025  Future Appointments   Date Time Provider Department Center   5/7/2025 10:00 AM Brian Holloway MD [id number] [place]   7/2/2025  8:40 AM [facility] [address]   7/2/2025  9:15 AM Alfredo Wilson MD [id number] [place]   10/22/2025  8:30 AM Víctor Calles MD [id number] [place]        Influenza High Dose Vaccine PF 10/4/2017 Pneumococcal Conjugate (PCV-13) 4/19/2016 Pneumococcal Polysaccharide (PPSV-23) 8/1/2012 Tdap 4/19/2016 Zoster Recombinant 4/5/2018 Zoster Vaccine, Live 9/1/2015 Not reviewed this visit You Were Diagnosed With   
  
 Codes Comments Medicare annual wellness visit, subsequent    -  Primary ICD-10-CM: Z00.00 ICD-9-CM: V70.0 Other specified hypothyroidism     ICD-10-CM: E03.8 ICD-9-CM: 244.8 Hyperlipidemia, unspecified hyperlipidemia type     ICD-10-CM: E78.5 ICD-9-CM: 272.4 Vitals BP Pulse Temp Resp Height(growth percentile) Weight(growth percentile) 110/60 (BP 1 Location: Left arm, BP Patient Position: Sitting) 74 96.4 °F (35.8 °C) (Oral) 14 5' 1\" (1.549 m) 113 lb 1.6 oz (51.3 kg) SpO2 BMI OB Status Smoking Status 98% 21.37 kg/m2 Postmenopausal Current Every Day Smoker BMI and BSA Data Body Mass Index Body Surface Area  
 21.37 kg/m 2 1.49 m 2 Preferred Pharmacy Pharmacy Name Phone [facility] [address] [place] [address] [phone] Your Updated Medication List  
  
   
This list is accurate as of 6/6/18  2:07 PM.  Always use your most recent med list.  
  
  
  
  
 calcitonin (salmon) nasal  
Commonly known as:  MIACALCIN  
USE ONE SPRAY IN NOSTRIL ONCE DAILY  
  
 clindamycin 1 % external solution Commonly known as:  CLEOCIN T  
use thin film on affected area twice daily  
  
 diazePAM 2 mg tablet Commonly known as:  VALIUM  
  
 fexofenadine 180 mg tablet Commonly known as:  Aster Ojeda Take  by mouth. hydrOXYzine HCl 25 mg tablet Commonly known as:  ATARAX TAKE ONE TABLET BY MOUTH THREE TIMES DAILY AS NEEDED FOR  ITCHING  FOR  UP  TO  10  DAYS  
  
 levothyroxine 50 mcg tablet Commonly known as:  SYNTHROID  
TAKE ONE TABLET BY MOUTH ONCE DAILY BEFORE BREAKFAST  
  
 LYRICA 75 mg capsule Generic drug:  pregabalin TAKE ONE CAPSULE BY MOUTH THREE TIMES DAILY  
  
 magic mouthwash solution Magic mouth wash  Maalox Lidocaine 2% viscous  Diphenhydramine oral solution   Pharmacy to mix equal portions of ingredients to a total volume as indicated in the dispense amount. NASALCROM NA  
by Nasal route. SPIRULINA Take 6 Tabs by mouth daily. Each tab is 550mg. Follow-up Instructions Return in about 4 months (around 10/6/2018) for Medication re-evaluation. Patient Instructions Eat a well rounded diet. Stay physically active. Continue your current medications. Medicare Wellness Visit, Female The best way to live healthy is to have a lifestyle where you eat a well-balanced diet, exercise regularly, limit alcohol use, and quit all forms of tobacco/nicotine, if applicable. Regular preventive services are another way to keep healthy. Preventive services (vaccines, screening tests, monitoring & exams) can help personalize your care plan, which helps you manage your own care. Screening tests can find health problems at the earliest stages, when they are easiest to treat. [facility] follows the current, evidence-based guidelines published by the [facility] ([facility]) when recommending preventive services for our patients. Because we follow these guidelines, sometimes recommendations change over time as research supports it. (For example, mammograms used to be recommended annually. Even though Medicare will still pay for an annual mammogram, the newer guidelines recommend a mammogram every two years for women of average risk.) Of course, you and your provider may decide to screen more often for some diseases, based on your risk and co-morbidities (chronic disease you are already diagnosed with). Preventive services for you include: - Medicare offers their members a free annual wellness visit, which is time for you and your primary care provider to discuss and plan for your preventive service needs. Take advantage of this benefit every year! 
 
-All people over age 72 should receive the recommended pneumonia vaccines. Current USPSTF guidelines recommend a series of two vaccines for the best pneumonia protection.  
 
-All adults should have a yearly flu vaccine and a tetanus vaccine every 10 years. All adults age 61 years should receive a shingles vaccine once in their lifetime.   
 
-A bone mass density test is recommended when a woman turns 65 to screen for osteoporosis. This test is only recommended once as a screening. Some providers will use this same test as a disease monitoring tool if you already have osteoporosis. -All adults age 38-68 years who are overweight should have a diabetes screening test once every three years.  
 
-Other screening tests & preventive services for persons with diabetes include: an eye exam to screen for diabetic retinopathy, a kidney function test, a foot exam, and stricter control over your cholesterol.  
 
-Cardiovascular screening for adults with routine risk involves an electrocardiogram (ECG) at intervals determined by the provider.  
 
-Colorectal cancer screenings should be done for adults age 54-65 years with normal risk. There are a number of acceptable methods of screening for this type of cancer. Each test has its own benefits and drawbacks. Discuss with your provider what is most appropriate for you during your annual wellness visit. The different tests include: colonoscopy (considered the best screening method), a fecal occult blood test, a fecal DNA test, and sigmoidoscopy.  
 
-Breast cancer screenings are recommended every other year for women of normal risk age 54-69 years.  
 
-Cervical cancer screenings for women over age 72 are only recommended with certain risk factors.  
 
-All adults born between [facility] should be screened once for Hepatitis C.  
 
 Here is a list of your current Health Maintenance items (your personalized list of preventive services) with a due date: There are no preventive care reminders to display for this patient. Introducing [facility] SERVICES! Mira Frazier introduces [employer] patient portal. Now you can access parts of your medical record, email your doctor's office, and request medication refills online. 1. In your internet browser, go to [email or web address] 2. Click on the First Time User? Click Here link in the Sign In box. You will see the New Member Sign Up page. 3. Enter your [employer] Access Code exactly as it appears below. You will not need to use this code after youve completed the sign-up process. If you do not sign up before the expiration date, you must request a new code. · [employer] Access Code: [email or web address] Expires: 9/4/2018  2:07 PM 
 
4. Enter the last four digits of your Social Security Number (xxxx) and Date of Birth (mm/dd/yyyy) as indicated and click Submit. You will be taken to the next sign-up page. 5. Create a [employer] ID. This will be your [employer] login ID and cannot be changed, so think of one that is secure and easy to remember. 6. Create a [employer] password. You can change your password at any time. 7. Enter your Password Reset Question and Answer. This can be used at a later time if you forget your password. 8. Enter your e-mail address. You will receive e-mail notification when new information is available in [address] 9. Click Sign Up. You can now view and download portions of your medical record. 10. Click the Download Summary menu link to download a portable copy of your medical information. If you have questions, please visit the Frequently Asked Questions section of the [employer] website. Remember, [employer] is NOT to be used for urgent needs. For medical emergencies, dial 911. Now available from your iPhone and Android! Please provide this summary of care documentation to your next provider. Your primary care clinician is listed as Miracle Adams. If you have any questions after today's visit, please call [phone].

## 2025-06-04 NOTE — PROGRESS NOTES
"Physical Therapy Evaluation    Patient Name: Sandra Bradshaw  MRN: [id number]  Evaluation Date: 6/4/2025  Time Calculation  Start Time: 1010  Stop Time: 1050  Time Calculation (min): 40 min    Current Problem  Problem List Items Addressed This Visit           ICD-10-CM    Low back pain M54.50     2025: EVAL ONLY - CS [employer] 7500 - AUTH REQ / $9200 OOP not met / $50 COPAY / 20V/yr - 0 used / Per CS W/S, Availity [id number] / ds 6/3/25 //    Subjective   General:       Patient reported hx of injury: in September of 2024 patient was cleaning shower and leaning over and twisting and started having severe pain is in the low back and after a few weeks started to go right low back down to right calf with paresthesia felt in right calf,  H/o sciatica in the past over the last 5 years, sporadically.  Pt has a back brace with and without plastic on the back.  Wears a brace most of the day.  Wears a compression wrap with pressure point tx to R calf-wears when she walks.  LBP is no longer severe.  Does press ups x 1, 30\", 1x/day with temporary relief.  N outer half of foot and pos-lat R calf.  Tingle at times R leg to ankle, pulsates.      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  Sit stand walk, support patients a little bit    Prior Function Per Pt/Caregiver Report:   Patient able to work, to ADL, stand, walk, and lying on side salt with less pain.  Pt goals: Return to prior level of function and have no pain  Imaging:  Patient states none    OBJECTIVE:  If left blank, assume NT:      Hina Lumbar Assessment:    S/S WORSE:  lye too long on L > R side, walking, standing  S/S BETTER:  lye on " End of Shift Note     Bedside shift change report given to Cristina Carver (oncoming nurse) by Anton Butler (offgoing nurse).   Report included the following information Nurse Handoff Report        Shift worked:  Nights   Shift summary and any significant changes:     No significant changes, Labs done     Concerns for physician to address:  None   Zone phone for oncoming shift:   [phone]      Patient Information  Tio Weathers  82 y.o.  9/15/2023 10:44 AM by Jodie Wei MD. Tio Weathers was admitted from Home     Problem List       Patient Active Problem List     Diagnosis Date Noted    Weakness generalized 09/15/2023    Easy bruising 08/31/2017    History of rheumatic fever 08/31/2017    Age-related osteoporosis without current pathological fracture 08/31/2017    Personal history of DVT (deep vein thrombosis) 12/21/2016    History of shingles 12/21/2016    Irritable bowel syndrome without diarrhea 03/17/2016    Hypothyroidism 12/08/2014    Spinal stenosis, lumbar 12/08/2014    Hyperlipidemia 12/08/2014    Degenerative disc disease, lumbar 12/08/2014      Past Medical History        Past Medical History:   Diagnosis Date    Adverse effect of anesthesia       decreased BP with nitrous oxide    Chronic kidney disease       kidney stones    Chronic pain       neuropathy - burning feeling    Degenerative disc disease      Hypothyroidism      Ill-defined condition       genital warts    Ill-defined condition       neuropathy in torso    Ill-defined condition       osteopenia    Ill-defined condition       stigmatism right eye    Ill-defined condition       IBS    Ill-defined condition       phlebitis    Ill-defined condition       hx chickenpox/shingles    Ill-defined condition       hx double pneumonia    Ill-defined condition       \"stomach moved over after fall\" per pt per MD    Ill-defined condition       as child chronic tonsillitis/strep/gets infections easily per pt    Lumbar stenosis      Rheumatic fever      Thromboembolus back, leaning forward in chair, reposition and movement, ibuprofen at times helps-often does not  POSTURE:  shifts to the   POSTURE CORRECTION: Increase  Pt reports their typical sitting surface is a .  Pt reports they are stiff getting up from chair:        Myotomes/MMT's R L   Hip Flexion     Knee Extension     Ankle Dorsiflexion     EHL     Ankle Plantarflexion               DTR       NE=No effect, C=centralize, A=abolish, p=peripheralize, P=produce, B=better, W=worse, D=decrease, I=increase, NW=no worse, NB=no better    Lumbar spine/trunk ROM S/S   FIS Min/mod decr Pulling R back and thigh   EIS Min decrease Mild decrease   SG L Mod decrease Mild decrease   SG R Min decrease Moderate decrease         S/S During S/S After   Rep FIS     Rep EIS     Rep EIL     Rep MANPREET     Rep SG L     Rep SG R Decrease Better, abolish tingling          Pt provisional classification derangement, side glide right preference      Flexibility:       Flexibility R L   Hamstring Moderate decrease Moderate decrease   Quad     Hip flexor       Palpation:     Special Tests:     Gait:   WFL, although noted slow binu.  Balance:   Appears WFL for basic gait mobility  Stairs:     Bed Mobility:     Transfers:   Independent  Other:       Outcome Measures:    OSWESTRY= 24    OP EDUCATION:   HEP:  Access Code: [email or web address]  URL: [email or web address]  Date: 06/04/2025  Prepared by: Jayson Fernández    Exercises  - Left Standing Lateral Shift Correction at Wall - Hold  - 4 x daily - 7 x weekly - 1 sets - 10 reps  Continue this exercise unless it causes severe pain or significantly worsens symptoms down right leg.  If that happens stop this exercise and okay to try your press ups again if it gives you relief.-Do the exercises in doorway like we did today in therapy and not against the wall.  Patient educated on the anatomy of spine and how things work as far as sciatic symptoms  Patient states understanding of all and demonstrates appropriate  "technique for exercise    Today's Treatment:  Therapeutic Exercise  5 minutes, no charge due to insurance restrictions  Education and performed right side glide in doorway and issued for HEP.    Assessment       pt is s/p onset of right low back pain and radicular symptoms down right lower extremity and needs PT to incr ROM, strength, flexibility, improve posture and decr pain to restore function.      Based on the history including personal factors and/or comorbidities, examination of body systems including body structures and function, activity limitations, and/or participation restrictions, as well as clinical presentation, patient meets criteria for a moderate complexity evaluation.    Plan     Next visit review HEP technique and assess response to this.    Skilled PT consisting of:  Aquatics, therapeutic exercise, therapeutic activity, NMR, manual, thermal, electric stimulation, US, light therapy, gait training, transfer training, dry needle.  Mechanical traction PRN and only if OK by PT, canalith repositioning  Rehab Potential: good  Frequency:  2x/wk  Duration: 20 weeks    Goals:    STG:   Pt to be I in initial HEP.  Pt to be I in posture correction.    LTG\"s:  Incr MMT of lower extremities and core by 1 grade for improved stability work.  Improve score on outcome form by 10 points to show incr in function.  Incr ROM of lumbar spine flexion and extension to WFL for less strain with bending  Decr max pain level to 2 for all activities including sleeping on her side.  Incr flexibility of hamstrings to within functional limits to allow improved trunk flexion with less strain on back    Some of This note was created using voice recognition software and was not corrected for typographical or grammatical errors.   "